# Patient Record
Sex: MALE | Race: BLACK OR AFRICAN AMERICAN | NOT HISPANIC OR LATINO | URBAN - METROPOLITAN AREA
[De-identification: names, ages, dates, MRNs, and addresses within clinical notes are randomized per-mention and may not be internally consistent; named-entity substitution may affect disease eponyms.]

---

## 2019-12-01 ENCOUNTER — OUTPATIENT (OUTPATIENT)
Dept: OUTPATIENT SERVICES | Facility: HOSPITAL | Age: 84
LOS: 1 days | End: 2019-12-01
Payer: MEDICAID

## 2019-12-01 PROCEDURE — G9001: CPT

## 2019-12-02 ENCOUNTER — INPATIENT (INPATIENT)
Facility: HOSPITAL | Age: 84
LOS: 4 days | Discharge: HOME HEALTH | DRG: 391 | End: 2019-12-07
Attending: HOSPITALIST | Admitting: HOSPITALIST
Payer: MEDICAID

## 2019-12-02 VITALS
WEIGHT: 154.1 LBS | DIASTOLIC BLOOD PRESSURE: 81 MMHG | OXYGEN SATURATION: 96 % | HEART RATE: 79 BPM | RESPIRATION RATE: 18 BRPM | SYSTOLIC BLOOD PRESSURE: 137 MMHG | TEMPERATURE: 99 F | HEIGHT: 68 IN

## 2019-12-02 LAB
ALBUMIN SERPL ELPH-MCNC: 4 G/DL — SIGNIFICANT CHANGE UP (ref 3.3–5.2)
ALP SERPL-CCNC: 67 U/L — SIGNIFICANT CHANGE UP (ref 40–120)
ALT FLD-CCNC: 12 U/L — SIGNIFICANT CHANGE UP
ANION GAP SERPL CALC-SCNC: 18 MMOL/L — HIGH (ref 5–17)
APPEARANCE UR: CLEAR — SIGNIFICANT CHANGE UP
AST SERPL-CCNC: 11 U/L — SIGNIFICANT CHANGE UP
BASOPHILS # BLD AUTO: 0.02 K/UL — SIGNIFICANT CHANGE UP (ref 0–0.2)
BASOPHILS NFR BLD AUTO: 0.2 % — SIGNIFICANT CHANGE UP (ref 0–2)
BILIRUB SERPL-MCNC: 1.1 MG/DL — SIGNIFICANT CHANGE UP (ref 0.4–2)
BILIRUB UR-MCNC: NEGATIVE — SIGNIFICANT CHANGE UP
BUN SERPL-MCNC: 44 MG/DL — HIGH (ref 8–20)
CALCIUM SERPL-MCNC: 11.5 MG/DL — HIGH (ref 8.6–10.2)
CHLORIDE SERPL-SCNC: 104 MMOL/L — SIGNIFICANT CHANGE UP (ref 98–107)
CO2 SERPL-SCNC: 22 MMOL/L — SIGNIFICANT CHANGE UP (ref 22–29)
COLOR SPEC: YELLOW — SIGNIFICANT CHANGE UP
CREAT SERPL-MCNC: 1.59 MG/DL — HIGH (ref 0.5–1.3)
DIFF PNL FLD: ABNORMAL
EOSINOPHIL # BLD AUTO: 0.01 K/UL — SIGNIFICANT CHANGE UP (ref 0–0.5)
EOSINOPHIL NFR BLD AUTO: 0.1 % — SIGNIFICANT CHANGE UP (ref 0–6)
EPI CELLS # UR: SIGNIFICANT CHANGE UP
GLUCOSE SERPL-MCNC: 300 MG/DL — HIGH (ref 70–115)
GLUCOSE UR QL: 100 MG/DL
HCT VFR BLD CALC: 39.2 % — SIGNIFICANT CHANGE UP (ref 39–50)
HGB BLD-MCNC: 12.5 G/DL — LOW (ref 13–17)
IMM GRANULOCYTES NFR BLD AUTO: 0.4 % — SIGNIFICANT CHANGE UP (ref 0–1.5)
KETONES UR-MCNC: ABNORMAL
LEUKOCYTE ESTERASE UR-ACNC: ABNORMAL
LIDOCAIN IGE QN: 17 U/L — LOW (ref 22–51)
LYMPHOCYTES # BLD AUTO: 1.99 K/UL — SIGNIFICANT CHANGE UP (ref 1–3.3)
LYMPHOCYTES # BLD AUTO: 18.3 % — SIGNIFICANT CHANGE UP (ref 13–44)
MCHC RBC-ENTMCNC: 28.2 PG — SIGNIFICANT CHANGE UP (ref 27–34)
MCHC RBC-ENTMCNC: 31.9 GM/DL — LOW (ref 32–36)
MCV RBC AUTO: 88.5 FL — SIGNIFICANT CHANGE UP (ref 80–100)
MONOCYTES # BLD AUTO: 0.83 K/UL — SIGNIFICANT CHANGE UP (ref 0–0.9)
MONOCYTES NFR BLD AUTO: 7.6 % — SIGNIFICANT CHANGE UP (ref 2–14)
NEUTROPHILS # BLD AUTO: 7.98 K/UL — HIGH (ref 1.8–7.4)
NEUTROPHILS NFR BLD AUTO: 73.4 % — SIGNIFICANT CHANGE UP (ref 43–77)
NITRITE UR-MCNC: NEGATIVE — SIGNIFICANT CHANGE UP
PH UR: 5 — SIGNIFICANT CHANGE UP (ref 5–8)
PLATELET # BLD AUTO: 220 K/UL — SIGNIFICANT CHANGE UP (ref 150–400)
POTASSIUM SERPL-MCNC: 5.2 MMOL/L — SIGNIFICANT CHANGE UP (ref 3.5–5.3)
POTASSIUM SERPL-SCNC: 5.2 MMOL/L — SIGNIFICANT CHANGE UP (ref 3.5–5.3)
PROT SERPL-MCNC: 8 G/DL — SIGNIFICANT CHANGE UP (ref 6.6–8.7)
PROT UR-MCNC: 30 MG/DL
RBC # BLD: 4.43 M/UL — SIGNIFICANT CHANGE UP (ref 4.2–5.8)
RBC # FLD: 12.8 % — SIGNIFICANT CHANGE UP (ref 10.3–14.5)
RBC CASTS # UR COMP ASSIST: SIGNIFICANT CHANGE UP /HPF (ref 0–4)
SODIUM SERPL-SCNC: 144 MMOL/L — SIGNIFICANT CHANGE UP (ref 135–145)
SP GR SPEC: 1.02 — SIGNIFICANT CHANGE UP (ref 1.01–1.02)
UROBILINOGEN FLD QL: NEGATIVE MG/DL — SIGNIFICANT CHANGE UP
WBC # BLD: 10.87 K/UL — HIGH (ref 3.8–10.5)
WBC # FLD AUTO: 10.87 K/UL — HIGH (ref 3.8–10.5)
WBC UR QL: SIGNIFICANT CHANGE UP

## 2019-12-02 PROCEDURE — 99223 1ST HOSP IP/OBS HIGH 75: CPT

## 2019-12-02 PROCEDURE — 74176 CT ABD & PELVIS W/O CONTRAST: CPT | Mod: 26

## 2019-12-02 PROCEDURE — 99285 EMERGENCY DEPT VISIT HI MDM: CPT

## 2019-12-02 RX ORDER — SODIUM CHLORIDE 9 MG/ML
500 INJECTION INTRAMUSCULAR; INTRAVENOUS; SUBCUTANEOUS ONCE
Refills: 0 | Status: COMPLETED | OUTPATIENT
Start: 2019-12-02 | End: 2019-12-02

## 2019-12-02 RX ADMIN — SODIUM CHLORIDE 500 MILLILITER(S): 9 INJECTION INTRAMUSCULAR; INTRAVENOUS; SUBCUTANEOUS at 20:00

## 2019-12-02 RX ADMIN — SODIUM CHLORIDE 500 MILLILITER(S): 9 INJECTION INTRAMUSCULAR; INTRAVENOUS; SUBCUTANEOUS at 21:01

## 2019-12-02 NOTE — ED ADULT NURSE NOTE - OBJECTIVE STATEMENT
Assumed pt. care at 1700. Pt. A&Ox2. Pt. daughter at bedside translating. Pt. daughter states that pt. was seen at [facility] on Thursday. Pt. daughter states that  they found tumors on his CT and was sent home on flagyl. Pt. daughter states she has been giving him Zofran she has. Pt. daughter states when he vomits its more like sputum. Pt. has diabetic wound on left on ankle. Pt. abdomen  tender on right side. Abdomen soft, bowel sounds heard. Last bowel movement Thursday. Denies diarrhea.

## 2019-12-02 NOTE — ED ADULT NURSE NOTE - CHIEF COMPLAINT QUOTE
pt brought in by EMS from [facility] for vomitting x5days and generalized pain. daughter at bedside states pt unable to tolerate PO [facility] at [facility] given antibiotics and bland diet with no relief. denies injuries or fall.

## 2019-12-02 NOTE — ED PROVIDER NOTE - OBJECTIVE STATEMENT
Patient is a 89 year old male with history of afib, DM, HTn presenting with persistent vomiting. Symptoms began 5 days ago. 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 Daughter notes he vomits every time he ingests something. Sent home with flagyl. No diarrhea. No AMS. Seen by PMD today who sent him to ED. Pt unable to contribute to further history.

## 2019-12-02 NOTE — ED PROVIDER NOTE - CLINICAL SUMMARY MEDICAL DECISION MAKING FREE TEXT BOX
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 will obtain labs, ivf, GI consult. possible rpt ct a/p

## 2019-12-02 NOTE — ED ADULT TRIAGE NOTE - CHIEF COMPLAINT QUOTE
pt brought in by EMS from [facility] for vomitting x5days and generalized pain. daughter at bedside states pt unable to tolerate PO seen at [facility] given antibiotics and bland diet with no relief. denies injuries or fall.

## 2019-12-02 NOTE — ED ADULT NURSE REASSESSMENT NOTE - NS ED NURSE REASSESS COMMENT FT1
Pt. mental status unchanged. Respirations even and unlabored. Pt. resting comfortably in stretcher. Pt. waiting for CT

## 2019-12-03 DIAGNOSIS — I48.91 UNSPECIFIED ATRIAL FIBRILLATION: ICD-10-CM

## 2019-12-03 DIAGNOSIS — R10.13 EPIGASTRIC PAIN: ICD-10-CM

## 2019-12-03 DIAGNOSIS — R11.11 VOMITING WITHOUT NAUSEA: ICD-10-CM

## 2019-12-03 DIAGNOSIS — D49.0 NEOPLASM OF UNSPECIFIED BEHAVIOR OF DIGESTIVE SYSTEM: ICD-10-CM

## 2019-12-03 DIAGNOSIS — Z71.89 OTHER SPECIFIED COUNSELING: ICD-10-CM

## 2019-12-03 LAB
ANION GAP SERPL CALC-SCNC: 12 MMOL/L — SIGNIFICANT CHANGE UP (ref 5–17)
BASOPHILS # BLD AUTO: 0.02 K/UL — SIGNIFICANT CHANGE UP (ref 0–0.2)
BASOPHILS NFR BLD AUTO: 0.3 % — SIGNIFICANT CHANGE UP (ref 0–2)
BUN SERPL-MCNC: 37 MG/DL — HIGH (ref 8–20)
CALCIUM SERPL-MCNC: 11.2 MG/DL — HIGH (ref 8.6–10.2)
CALCIUM SERPL-MCNC: 11.6 MG/DL — HIGH (ref 8.4–10.5)
CHLORIDE SERPL-SCNC: 109 MMOL/L — HIGH (ref 98–107)
CO2 SERPL-SCNC: 26 MMOL/L — SIGNIFICANT CHANGE UP (ref 22–29)
CREAT SERPL-MCNC: 1.38 MG/DL — HIGH (ref 0.5–1.3)
EOSINOPHIL # BLD AUTO: 0.05 K/UL — SIGNIFICANT CHANGE UP (ref 0–0.5)
EOSINOPHIL NFR BLD AUTO: 0.7 % — SIGNIFICANT CHANGE UP (ref 0–6)
GLUCOSE BLDC GLUCOMTR-MCNC: 193 MG/DL — HIGH (ref 70–99)
GLUCOSE BLDC GLUCOMTR-MCNC: 198 MG/DL — HIGH (ref 70–99)
GLUCOSE BLDC GLUCOMTR-MCNC: 286 MG/DL — HIGH (ref 70–99)
GLUCOSE BLDC GLUCOMTR-MCNC: 350 MG/DL — HIGH (ref 70–99)
GLUCOSE SERPL-MCNC: 199 MG/DL — HIGH (ref 70–115)
HBA1C BLD-MCNC: 10.7 % — HIGH (ref 4–5.6)
HCT VFR BLD CALC: 39.2 % — SIGNIFICANT CHANGE UP (ref 39–50)
HGB BLD-MCNC: 12.7 G/DL — LOW (ref 13–17)
IMM GRANULOCYTES NFR BLD AUTO: 0.1 % — SIGNIFICANT CHANGE UP (ref 0–1.5)
LYMPHOCYTES # BLD AUTO: 1.18 K/UL — SIGNIFICANT CHANGE UP (ref 1–3.3)
LYMPHOCYTES # BLD AUTO: 16.2 % — SIGNIFICANT CHANGE UP (ref 13–44)
MCHC RBC-ENTMCNC: 29 PG — SIGNIFICANT CHANGE UP (ref 27–34)
MCHC RBC-ENTMCNC: 32.4 GM/DL — SIGNIFICANT CHANGE UP (ref 32–36)
MCV RBC AUTO: 89.5 FL — SIGNIFICANT CHANGE UP (ref 80–100)
MONOCYTES # BLD AUTO: 0.68 K/UL — SIGNIFICANT CHANGE UP (ref 0–0.9)
MONOCYTES NFR BLD AUTO: 9.3 % — SIGNIFICANT CHANGE UP (ref 2–14)
NEUTROPHILS # BLD AUTO: 5.35 K/UL — SIGNIFICANT CHANGE UP (ref 1.8–7.4)
NEUTROPHILS NFR BLD AUTO: 73.4 % — SIGNIFICANT CHANGE UP (ref 43–77)
PLATELET # BLD AUTO: 216 K/UL — SIGNIFICANT CHANGE UP (ref 150–400)
POTASSIUM SERPL-MCNC: 4.3 MMOL/L — SIGNIFICANT CHANGE UP (ref 3.5–5.3)
POTASSIUM SERPL-SCNC: 4.3 MMOL/L — SIGNIFICANT CHANGE UP (ref 3.5–5.3)
PTH-INTACT FLD-MCNC: 106 PG/ML — HIGH (ref 15–65)
RBC # BLD: 4.38 M/UL — SIGNIFICANT CHANGE UP (ref 4.2–5.8)
RBC # FLD: 12.7 % — SIGNIFICANT CHANGE UP (ref 10.3–14.5)
SODIUM SERPL-SCNC: 147 MMOL/L — HIGH (ref 135–145)
WBC # BLD: 7.29 K/UL — SIGNIFICANT CHANGE UP (ref 3.8–10.5)
WBC # FLD AUTO: 7.29 K/UL — SIGNIFICANT CHANGE UP (ref 3.8–10.5)

## 2019-12-03 PROCEDURE — 99222 1ST HOSP IP/OBS MODERATE 55: CPT

## 2019-12-03 PROCEDURE — 12345: CPT | Mod: NC

## 2019-12-03 PROCEDURE — 99497 ADVNCD CARE PLAN 30 MIN: CPT | Mod: 25

## 2019-12-03 PROCEDURE — 93010 ELECTROCARDIOGRAM REPORT: CPT

## 2019-12-03 RX ORDER — SIMVASTATIN 20 MG/1
40 TABLET, FILM COATED ORAL AT BEDTIME
Refills: 0 | Status: DISCONTINUED | OUTPATIENT
Start: 2019-12-03 | End: 2019-12-07

## 2019-12-03 RX ORDER — INSULIN LISPRO 100/ML
VIAL (ML) SUBCUTANEOUS
Refills: 0 | Status: DISCONTINUED | OUTPATIENT
Start: 2019-12-03 | End: 2019-12-07

## 2019-12-03 RX ORDER — ONDANSETRON 8 MG/1
4 TABLET, FILM COATED ORAL EVERY 6 HOURS
Refills: 0 | Status: DISCONTINUED | OUTPATIENT
Start: 2019-12-03 | End: 2019-12-07

## 2019-12-03 RX ORDER — SODIUM CHLORIDE 9 MG/ML
1000 INJECTION INTRAMUSCULAR; INTRAVENOUS; SUBCUTANEOUS
Refills: 0 | Status: DISCONTINUED | OUTPATIENT
Start: 2019-12-03 | End: 2019-12-03

## 2019-12-03 RX ORDER — METOCLOPRAMIDE HCL 10 MG
10 TABLET ORAL EVERY 8 HOURS
Refills: 0 | Status: DISCONTINUED | OUTPATIENT
Start: 2019-12-03 | End: 2019-12-03

## 2019-12-03 RX ORDER — GABAPENTIN 400 MG/1
100 CAPSULE ORAL DAILY
Refills: 0 | Status: DISCONTINUED | OUTPATIENT
Start: 2019-12-03 | End: 2019-12-07

## 2019-12-03 RX ORDER — SODIUM CHLORIDE 9 MG/ML
1000 INJECTION INTRAMUSCULAR; INTRAVENOUS; SUBCUTANEOUS
Refills: 0 | Status: DISCONTINUED | OUTPATIENT
Start: 2019-12-03 | End: 2019-12-04

## 2019-12-03 RX ORDER — DORZOLAMIDE HYDROCHLORIDE TIMOLOL MALEATE 20; 5 MG/ML; MG/ML
1 SOLUTION/ DROPS OPHTHALMIC
Refills: 0 | Status: DISCONTINUED | OUTPATIENT
Start: 2019-12-03 | End: 2019-12-07

## 2019-12-03 RX ORDER — LISINOPRIL 2.5 MG/1
20 TABLET ORAL DAILY
Refills: 0 | Status: DISCONTINUED | OUTPATIENT
Start: 2019-12-03 | End: 2019-12-03

## 2019-12-03 RX ORDER — INSULIN LISPRO 100/ML
VIAL (ML) SUBCUTANEOUS AT BEDTIME
Refills: 0 | Status: DISCONTINUED | OUTPATIENT
Start: 2019-12-03 | End: 2019-12-07

## 2019-12-03 RX ORDER — ALBUTEROL 90 UG/1
2.5 AEROSOL, METERED ORAL ONCE
Refills: 0 | Status: COMPLETED | OUTPATIENT
Start: 2019-12-03 | End: 2019-12-03

## 2019-12-03 RX ORDER — ACETAMINOPHEN 500 MG
1000 TABLET ORAL ONCE
Refills: 0 | Status: COMPLETED | OUTPATIENT
Start: 2019-12-03 | End: 2019-12-03

## 2019-12-03 RX ORDER — SODIUM CHLORIDE 9 MG/ML
1000 INJECTION, SOLUTION INTRAVENOUS
Refills: 0 | Status: DISCONTINUED | OUTPATIENT
Start: 2019-12-03 | End: 2019-12-07

## 2019-12-03 RX ORDER — HEPARIN SODIUM 5000 [USP'U]/ML
5000 INJECTION INTRAVENOUS; SUBCUTANEOUS EVERY 8 HOURS
Refills: 0 | Status: DISCONTINUED | OUTPATIENT
Start: 2019-12-03 | End: 2019-12-07

## 2019-12-03 RX ORDER — DEXTROSE 50 % IN WATER 50 %
25 SYRINGE (ML) INTRAVENOUS ONCE
Refills: 0 | Status: DISCONTINUED | OUTPATIENT
Start: 2019-12-03 | End: 2019-12-07

## 2019-12-03 RX ORDER — DEXTROSE 50 % IN WATER 50 %
12.5 SYRINGE (ML) INTRAVENOUS ONCE
Refills: 0 | Status: DISCONTINUED | OUTPATIENT
Start: 2019-12-03 | End: 2019-12-07

## 2019-12-03 RX ORDER — ASPIRIN/CALCIUM CARB/MAGNESIUM 324 MG
81 TABLET ORAL DAILY
Refills: 0 | Status: DISCONTINUED | OUTPATIENT
Start: 2019-12-03 | End: 2019-12-07

## 2019-12-03 RX ORDER — METOCLOPRAMIDE HCL 10 MG
10 TABLET ORAL EVERY 6 HOURS
Refills: 0 | Status: DISCONTINUED | OUTPATIENT
Start: 2019-12-03 | End: 2019-12-07

## 2019-12-03 RX ORDER — GLUCAGON INJECTION, SOLUTION 0.5 MG/.1ML
1 INJECTION, SOLUTION SUBCUTANEOUS ONCE
Refills: 0 | Status: DISCONTINUED | OUTPATIENT
Start: 2019-12-03 | End: 2019-12-07

## 2019-12-03 RX ORDER — DEXTROSE 50 % IN WATER 50 %
15 SYRINGE (ML) INTRAVENOUS ONCE
Refills: 0 | Status: DISCONTINUED | OUTPATIENT
Start: 2019-12-03 | End: 2019-12-07

## 2019-12-03 RX ADMIN — DORZOLAMIDE HYDROCHLORIDE TIMOLOL MALEATE 1 DROP(S): 20; 5 SOLUTION/ DROPS OPHTHALMIC at 06:49

## 2019-12-03 RX ADMIN — Medication 400 MILLIGRAM(S): at 16:15

## 2019-12-03 RX ADMIN — Medication 1: at 16:12

## 2019-12-03 RX ADMIN — SODIUM CHLORIDE 80 MILLILITER(S): 9 INJECTION INTRAMUSCULAR; INTRAVENOUS; SUBCUTANEOUS at 22:10

## 2019-12-03 RX ADMIN — HEPARIN SODIUM 5000 UNIT(S): 5000 INJECTION INTRAVENOUS; SUBCUTANEOUS at 12:19

## 2019-12-03 RX ADMIN — Medication 10 MILLIGRAM(S): at 17:39

## 2019-12-03 RX ADMIN — Medication 81 MILLIGRAM(S): at 11:17

## 2019-12-03 RX ADMIN — HEPARIN SODIUM 5000 UNIT(S): 5000 INJECTION INTRAVENOUS; SUBCUTANEOUS at 22:10

## 2019-12-03 RX ADMIN — ONDANSETRON 4 MILLIGRAM(S): 8 TABLET, FILM COATED ORAL at 16:15

## 2019-12-03 RX ADMIN — ALBUTEROL 2.5 MILLIGRAM(S): 90 AEROSOL, METERED ORAL at 14:18

## 2019-12-03 RX ADMIN — DORZOLAMIDE HYDROCHLORIDE TIMOLOL MALEATE 1 DROP(S): 20; 5 SOLUTION/ DROPS OPHTHALMIC at 16:15

## 2019-12-03 RX ADMIN — Medication 2: at 22:12

## 2019-12-03 RX ADMIN — SODIUM CHLORIDE 75 MILLILITER(S): 9 INJECTION INTRAMUSCULAR; INTRAVENOUS; SUBCUTANEOUS at 04:58

## 2019-12-03 RX ADMIN — GABAPENTIN 100 MILLIGRAM(S): 400 CAPSULE ORAL at 11:18

## 2019-12-03 RX ADMIN — Medication 1: at 11:17

## 2019-12-03 RX ADMIN — Medication 3: at 07:58

## 2019-12-03 NOTE — GOALS OF CARE CONVERSATION - ADVANCED CARE PLANNING - CONVERSATION DETAILS
I spoke with Patient's daughter Lisset, who is an RN  at [employer].  Patient lives with daughter, has been pretty much independent until 6 weeks ago. He is OOB/CH, moving with cane and  wheelchair as needed.    We spoke about his Gastric Mass, she has spoken with GI Specialist who advises conservative treatment, avoid EGD  may cause more harm then good.  Patient is not able to tolerate food or fluids, one of her goals is to be able to keep her father hydrated.  Would consider a feeding tube, if it was indicated.    MOLST form she was somewhat familiar with.  I reviewed document in its entirety, completed and has been signed. Designated as DNR/DNI but  continue to treat and pursue all medical interventions as needed.    She has already completed and submitted MEDICAID Dept. application for providing HHA's at home for her father.  Not sure when the evaluation will be done.    I spoke about Hospice benefit, Lisset would rather pursue Medicaid Aides that would hopefully provide more hours of  home care for her Dad

## 2019-12-03 NOTE — H&P ADULT - HISTORY OF PRESENT ILLNESS
89yoM Creole speaking, hx HTN, DM, recently diagnosed with Afib and gastric mass at OSH presenting with nausea and vomiting.  Pt is poor historian despite use of Creole .  Collateral hx obtained via phone from daughter Lisset ([phone]).  Pt reports having generalized abdominal pain associated with nausea, non-bilious, non-bloody vomiting.  He is unable to specify how long he has been having these symptoms. Pt denies any fevers, chills or diarrhea. 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  Pt was started on Flagyl by [facility].

## 2019-12-03 NOTE — CONSULT NOTE ADULT - SUBJECTIVE AND OBJECTIVE BOX
HPI:This is an elderly Creole Speaking Male PMH HTN, DM, AFIB and Neuropathy was diagnosed reently with a gastric mass that is associated with persistent nausea and vomiting.  Unable to tolerate solid food. He C/O epigastric and generalized abdominal pain and pressure.  AT [facility] last week, CT read as "GIST" tumor. in fundal portion of stomach.  No other complaints at present    89yoM Creole speaking, hx HTN, DM, recently diagnosed with Afib and gastric mass at OSH presenting with nausea and vomiting.  Pt is poor historian despite use of Creole .  Collateral hx obtained via phone from daughter Lisset ([phone]).  Pt reports having generalized abdominal pain associated with nausea, non-bilious, non-bloody vomiting.  He is unable to specify how long he has been having these symptoms. Pt denies any fevers, chills or diarrhea. 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  Pt was started on Flagyl by [facility]. (03 Dec 2019 03:58)      PERTINENT PMH REVIEWED: Yes    PAST MEDICAL & SURGICAL HISTORY:  Neuropathy  Hypertension  A-fib  Diabetes  No significant past surgical history    SOCIAL HISTORY:  EtOH Yes   No                                    Drugs  Yes  No                                    smoker  nonsmoker                                    Admitted from: [facility] [phone]    FAMILY HISTORY:  No pertinent family history in first degree relatives    No Known Allergies    Baseline ADLs (prior to admission):  Independent/ Dependent      Present Symptoms:     Dyspnea: 0   Nausea/Vomiting: Yes   Anxiety:  Yes No  Depression: Yes   Fatigue: Yes   Loss of appetite: Yes not tolerating solid food    Pain: epigastic and generalized abdominal pain            Character-            Duration-            Effect-            Factors-            Frequency-            Location-            Severity-    Review of Systems: Reviewed                      All others negative    MEDICATIONS  (STANDING):  aspirin enteric coated 81 milliGRAM(s) Oral daily  dextrose 5%. 1000 milliLiter(s) (50 mL/Hr) IV Continuous <Continuous>  dextrose 50% Injectable 12.5 Gram(s) IV Push once  dextrose 50% Injectable 25 Gram(s) IV Push once  dextrose 50% Injectable 25 Gram(s) IV Push once  dorzolamide 2%/timolol 0.5% Ophthalmic Solution 1 Drop(s) Both EYES two times a day  gabapentin 100 milliGRAM(s) Oral daily  heparin  Injectable 5000 Unit(s) SubCutaneous every 8 hours  insulin lispro (HumaLOG) corrective regimen sliding scale   SubCutaneous three times a day before meals  insulin lispro (HumaLOG) corrective regimen sliding scale   SubCutaneous at bedtime  simvastatin 40 milliGRAM(s) Oral at bedtime  sodium chloride 0.9%. 1000 milliLiter(s) (75 mL/Hr) IV Continuous <Continuous>    MEDICATIONS  (PRN):  dextrose 40% Gel 15 Gram(s) Oral once PRN Blood Glucose LESS THAN 70 milliGRAM(s)/deciliter  glucagon  Injectable 1 milliGRAM(s) IntraMuscular once PRN Glucose LESS THAN 70 milligrams/deciliter  metoclopramide Injectable 10 milliGRAM(s) IV Push every 8 hours PRN Nausea and/or Vomiting  ondansetron Injectable 4 milliGRAM(s) IV Push every 6 hours PRN Nausea and/or Vomiting      PHYSICAL EXAM:    Vital Signs Last 24 Hrs  T(C): 36.9 (03 Dec 2019 07:39), Max: 37 (02 Dec 2019 16:20)  T(F): 98.5 (03 Dec 2019 07:39), Max: 98.6 (02 Dec 2019 16:20)  HR: 61 (03 Dec 2019 07:39) (61 - 97)  BP: 142/72 (03 Dec 2019 07:39) (129/78 - 142/72)  BP(mean): --  RR: 18 (03 Dec 2019 07:39) (18 - 18)  SpO2: 91% (03 Dec 2019 07:39) (91% - 100%)    General: alert  oriented x ____ lethargic                   cachexia lower extremity edema, ace wraps lower calfs     HEENT: dry mouth    Lungs: comfortable       CV: normal      GI: distended  tender  no BS               constipation  last BM:     : normal  incontinent     MSK:  weakness  edema             ambulatory  bedbound/wheelchair bound    Skin: normal  pressure ulcers- Stage_____  no rash    LABS:                        12.7   7.29  )-----------( 216      ( 03 Dec 2019 11:42 )             39.2     12-03    147<H>  |  109<H>  |  37.0<H>  ----------------------------<  199<H>  4.3   |  26.0  |  1.38<H>    Ca    11.2<H>      03 Dec 2019 11:42    TPro  8.0  /  Alb  4.0  /  TBili  1.1  /  DBili  x   /  AST  11  /  ALT  12  /  AlkPhos  67  12-02      Urinalysis Basic - ( 02 Dec 2019 20:10 )    Color: Yellow / Appearance: Clear / S.025 / pH: x  Gluc: x / Ketone: Small  / Bili: Negative / Urobili: Negative mg/dL   Blood: x / Protein: 30 mg/dL / Nitrite: Negative   Leuk Esterase: Trace / RBC: 0-2 /HPF / WBC 0-2   Sq Epi: x / Non Sq Epi: Occasional / Bacteria: x      I&O's Summary    RADIOLOGY & ADDITIONAL STUDIES:  < from: CT Abdomen and Pelvis No Cont (19 @ 21:56) >    IMPRESSION:    Uncomplicated cholelithiasis.    Collapsed stomach with incompletely characterized exophytic lesion   measuring 4 x 3.6 cm in the fundal portion. 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 GI consultation and further workup as   indicated. No evidence of small bowel obstruction or extraluminal   collection.     Incompletely characterized sub-cm left adrenal nodule.    Heterogeneously prominent prostate with nodular density near the apex and   bladder protrusion. Mild bladder wall thickening with moderate   distention. This may related to chronic bladder obstruction from enlarged   prostate. Correlate with urinalysis to assess for infection. Consider   direct visualization if there is concern for bladder malignancy.    Too small to characterize bilateral subpleural based nodules for which   nonemergent short-term pulmonary imaging follow-up is advised.      ADVANCE DIRECTIVES:   DNR  NO  Completed on:                     MOLST   NO   Completed on:  Living Will   NO   Completed on: HPI:This is an elderly Creole Speaking Male PMH HTN, DM, AFIB and Neuropathy was diagnosed reently with a gastric mass that is associated with persistent wretching and vomiting.  Unable to tolerateliquids or  solid food. He C/O epigastric and generalized abdominal pain and pressure.  AT [facility] last week, CT read as "GIST" tumor. in fundal portion of stomach.  No other complaints at present    89yoM Creole speaking, hx HTN, DM, recently diagnosed with Afib and gastric mass at OSH presenting with nausea and vomiting.  Pt is poor historian despite use of Creole .  Collateral hx obtained via phone from daughter Lisset ([phone]).  Pt reports having generalized abdominal pain associated with nausea, non-bilious, non-bloody vomiting.  He is unable to specify how long he has been having these symptoms. Pt denies any fevers, chills or diarrhea. 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  Pt was started on Flagyl by [facility]. (03 Dec 2019 03:58)      PERTINENT PMH REVIEWED: Yes    PAST MEDICAL & SURGICAL HISTORY:  Neuropathy  Hypertension  A-fib  Diabetes  No significant past surgical history    SOCIAL HISTORY:  EtOH   No                                    Drugs    No                                   nonsmoker                                    Admitted from: home surrogate daughter Lisset Alonzo [phone]    FAMILY HISTORY:  No pertinent family history in first degree relatives    No Known Allergies    Baseline ADLs (prior to admission):  Independent/ Dependent      Present Symptoms:     Dyspnea: 0 when having a coughing jag  Nausea/Vomiting: Yes   Anxiety:   No  Depression: Yes   Fatigue: Yes   Loss of appetite: Yes not tolerating solid food since     Pain: epigastic and generalized abdominal pain            Character-            Duration-            Effect-            Factors-            Frequency-            Location-            Severity-moderate    Review of Systems: Reviewed                      All others negative    MEDICATIONS  (STANDING):  aspirin enteric coated 81 milliGRAM(s) Oral daily  dextrose 5%. 1000 milliLiter(s) (50 mL/Hr) IV Continuous <Continuous>  dextrose 50% Injectable 12.5 Gram(s) IV Push once  dextrose 50% Injectable 25 Gram(s) IV Push once  dextrose 50% Injectable 25 Gram(s) IV Push once  dorzolamide 2%/timolol 0.5% Ophthalmic Solution 1 Drop(s) Both EYES two times a day  gabapentin 100 milliGRAM(s) Oral daily  heparin  Injectable 5000 Unit(s) SubCutaneous every 8 hours  insulin lispro (HumaLOG) corrective regimen sliding scale   SubCutaneous three times a day before meals  insulin lispro (HumaLOG) corrective regimen sliding scale   SubCutaneous at bedtime  simvastatin 40 milliGRAM(s) Oral at bedtime  sodium chloride 0.9%. 1000 milliLiter(s) (75 mL/Hr) IV Continuous <Continuous>    MEDICATIONS  (PRN):  dextrose 40% Gel 15 Gram(s) Oral once PRN Blood Glucose LESS THAN 70 milliGRAM(s)/deciliter  glucagon  Injectable 1 milliGRAM(s) IntraMuscular once PRN Glucose LESS THAN 70 milligrams/deciliter  metoclopramide Injectable 10 milliGRAM(s) IV Push every 8 hours PRN Nausea and/or Vomiting  ondansetron Injectable 4 milliGRAM(s) IV Push every 6 hours PRN Nausea and/or Vomiting      PHYSICAL EXAM:    Vital Signs Last 24 Hrs  T(C): 36.9 (03 Dec 2019 07:39), Max: 37 (02 Dec 2019 16:20)  T(F): 98.5 (03 Dec 2019 07:39), Max: 98.6 (02 Dec 2019 16:20)  HR: 61 (03 Dec 2019 07:39) (61 - 97)  BP: 142/72 (03 Dec 2019 07:39) (129/78 - 142/72)  BP(mean): --  RR: 18 (03 Dec 2019 07:39) (18 - 18)  SpO2: 91% (03 Dec 2019 07:39) (91% - 100%)    General: alert  oriented x _3___ weak short term memory loss                  cachexia lower extremity edema, ace wraps lower calfs     HEENT: dry mouth    Lungs: comfortable       CV: normal      GI: distended  tender  no BS               constipation  last BM:     : normal  incontinent     MSK:  weakness  edema             ambulatory  bedbound/wheelchair bound    Skin:  pressure ulcers-Left foot ulcer   no rash    LABS:                        12.7   7.29  )-----------( 216      ( 03 Dec 2019 11:42 )             39.2     12-03    147<H>  |  109<H>  |  37.0<H>  ----------------------------<  199<H>  4.3   |  26.0  |  1.38<H>    Ca    11.2<H>      03 Dec 2019 11:42    TPro  8.0  /  Alb  4.0  /  TBili  1.1  /  DBili  x   /  AST  11  /  ALT  12  /  AlkPhos  67  12-02      Urinalysis Basic - ( 02 Dec 2019 20:10 )  Color: Yellow / Appearance: Clear / S.025 / pH: x  Gluc: x / Ketone: Small  / Bili: Negative / Urobili: Negative mg/dL   Blood: x / Protein: 30 mg/dL / Nitrite: Negative   Leuk Esterase: Trace / RBC: 0-2 /HPF / WBC 0-2   Sq Epi: x / Non Sq Epi: Occasional / Bacteria: x      I&O's Summary    RADIOLOGY & ADDITIONAL STUDIES:  < from: CT Abdomen and Pelvis No Cont (19 @ 21:56) >    IMPRESSION:    Uncomplicated cholelithiasis.    Collapsed stomach with incompletely characterized exophytic lesion   measuring 4 x 3.6 cm in the fundal portion. 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 GI consultation and further workup as   indicated. No evidence of small bowel obstruction or extraluminal   collection.     Incompletely characterized sub-cm left adrenal nodule.    Heterogeneously prominent prostate with nodular density near the apex and   bladder protrusion. Mild bladder wall thickening with moderate   distention. This may related to chronic bladder obstruction from enlarged   prostate. Correlate with urinalysis to assess for infection. Consider   direct visualization if there is concern for bladder malignancy.    Too small to characterize bilateral subpleural based nodules for which   nonemergent short-term pulmonary imaging follow-up is advised.      ADVANCE DIRECTIVES:   DNR YES Completed on:   12/3/2019                  MOLST   YES   Completed on: 12/3/2019  Living Will   NO   Completed on:

## 2019-12-03 NOTE — CONSULT NOTE ADULT - ATTENDING COMMENTS
COUNSELING:    Face to face meeting to discuss Advanced Care Planning - Time Spent ______ Minutes.    More than 50% time spent in counseling and coordinating care. ____20__ Minutes.     Thank you for the opportunity to assist with the care of this patient.   [place] [facility] [phone].     See goals of care note.  Meeting with daughter shortly this afternoon to discuss her father's health and goals of care COUNSELING:    Face to face meeting to discuss Advanced Care Planning - Time Spent 20______ Minutes.    More than 50% time spent in counseling and coordinating care. ____30__ Minutes.     Thank you for the opportunity to assist with the care of this patient.   [place] [facility] [phone].     See goals of care note.  Meeting with daughter shortly this afternoon to discuss her father's health and goals of care

## 2019-12-03 NOTE — CONSULT NOTE ADULT - PROBLEM SELECTOR RECOMMENDATION 2
+ Cholelithiasis seen on CT  Gastric mass may be causing pressure and visceral pain  Consider increasing Neurontin  Consider initiating Fentanyl 12 mcg/hr patch for long acting pain relief  Ofirmev IVPB for pain  Tylenol liquid ATC

## 2019-12-03 NOTE — H&P ADULT - ASSESSMENT
89yoM Creole speaking, hx HTN, DM, recently diagnosed with Afib and gastric mass at OSH presenting with nausea and vomiting in setting of gastric mass    Intractable nausea and vomiting in setting of gastric mass  -CT abd/pelvis showing exophytic mass concerning for neoplasm  -Anti-emetics PRN  -Start gentle IVF  -Clear liquid diet, advance as tolerated  -Daughter aware of mass, informed her that per radiology report, there is concern for neoplasm  -Not sure if pt would be candidate for endoscopy given advanced age, but daughter requesting GI eval  -GI consulted  -Will hold off on resuming Flagyl as no diarrhea or systemic signs of infection     VINCE  -Likely pre-renal from GI losses  -Started on IVF, monitor Cr   -Holding Lisinopril     Hypercalcemia  -Repeat CA in AM, check PTH  -Holding HCTZ    Gastric mass    HTN  -Holding Lisinopril due to VINCE and HCTZ given hypercalcemia    DM  -Holding sulfonylurea   -Insulin sliding scale    Paroxysmal Afib  -Per daughter, had episode of Afib at OSH, not placed on any AC, presumably due to advanced age  -Monitor    Medication management  -Med rec left incomplete, partially done from meds listed in prescription writer  -Can call [facility] in     Prophylactic measure  -Heparin 89yoM Creole speaking, hx HTN, DM, recently diagnosed with Afib and gastric mass at OSH presenting with nausea and vomiting in setting of gastric mass    Intractable nausea and vomiting in setting of gastric mass  -CT abd/pelvis showing exophytic mass concerning for neoplasm  -Anti-emetics PRN  -Start gentle IVF  -Clear liquid diet, advance as tolerated  -Daughter aware of mass, informed her that per radiology report, there is concern for neoplasm  -Not sure if pt would be candidate for endoscopy given advanced age, but daughter requesting GI eval  -GI consulted  -Will hold off on resuming Flagyl as no diarrhea or systemic signs of infection     VINCE  -Likely pre-renal from GI losses  -Started on IVF, monitor Cr   -Holding Lisinopril     Hypercalcemia  -Repeat CA in AM, check PTH  -Holding HCTZ    HTN  -Holding Lisinopril due to VINCE and HCTZ given hypercalcemia    DM  -Holding sulfonylurea   -Insulin sliding scale    Paroxysmal Afib  -Per daughter, had episode of Afib at OSH, not placed on any AC, presumably due to advanced age  -Monitor    Medication management  -Med rec left incomplete, partially done from meds listed in prescription writer  -Can call [facility] in     Prophylactic measure  -Heparin

## 2019-12-03 NOTE — CONSULT NOTE ADULT - PROBLEM SELECTOR RECOMMENDATION 3
CT report provided earlier in this consult  GI consulted  Spoke with daughter and HCP-GI very reluctant to do EGD diagnostically, he is at   risk for many complications=avoid at all cost

## 2019-12-03 NOTE — CONSULT NOTE ADULT - SUBJECTIVE AND OBJECTIVE BOX
HPI:  89yoM Creole speaking, hx HTN, DM, AFIB( No AC), Neuropathy and glaucoma.   Pt is poor historian.   History obtained via phone from daughter Lisset Benavides ([phone]).  Daughter reports nausea, with non-bilious, non-bloody vomiting x 4 days.    Pt denies any fevers, chills or diarrhea. 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   Pt was started on Flagyl by Good José Manuel, given IVF and treated and released.  Since returning home, patient has had persistent vomiting on all attempts at feeding.  No bleeding.  No abdominal distention.   Per daughter there has never been an endoscopy or colonoscopy.  Pt had become progressively weaker and therefore brought to [facility].  No overt toxicity.  Repeat imaging performed.  Given IVF for PRA and Reglan.  No other new medical issues.  Pt lives at home with Daughter and her family.  No bleeding or weight loss.                PAST MEDICAL & SURGICAL HISTORY:  Neuropathy  Hypertension  A-fib  Diabetes  No significant past surgical history      ROS:  No Heartburn, regurgitation, dysphagia, odynophagia.  No dyspepsia  No abdominal pain.    No Nausea, vomiting.  No Bleeding.  No hematemesis.   No diarrhea.    No hematochezia.  No weight loss, anorexia.  No edema.      MEDICATIONS  (STANDING):  aspirin enteric coated 81 milliGRAM(s) Oral daily  dextrose 5%. 1000 milliLiter(s) (50 mL/Hr) IV Continuous <Continuous>  dextrose 50% Injectable 12.5 Gram(s) IV Push once  dextrose 50% Injectable 25 Gram(s) IV Push once  dextrose 50% Injectable 25 Gram(s) IV Push once  dorzolamide 2%/timolol 0.5% Ophthalmic Solution 1 Drop(s) Both EYES two times a day  gabapentin 100 milliGRAM(s) Oral daily  heparin  Injectable 5000 Unit(s) SubCutaneous every 8 hours  insulin lispro (HumaLOG) corrective regimen sliding scale   SubCutaneous three times a day before meals  insulin lispro (HumaLOG) corrective regimen sliding scale   SubCutaneous at bedtime  simvastatin 40 milliGRAM(s) Oral at bedtime  sodium chloride 0.9%. 1000 milliLiter(s) (75 mL/Hr) IV Continuous <Continuous>    MEDICATIONS  (PRN):  dextrose 40% Gel 15 Gram(s) Oral once PRN Blood Glucose LESS THAN 70 milliGRAM(s)/deciliter  glucagon  Injectable 1 milliGRAM(s) IntraMuscular once PRN Glucose LESS THAN 70 milligrams/deciliter  metoclopramide Injectable 10 milliGRAM(s) IV Push every 8 hours PRN Nausea and/or Vomiting  ondansetron Injectable 4 milliGRAM(s) IV Push every 6 hours PRN Nausea and/or Vomiting      Allergies  No Known Allergies    SOCIAL HISTORY:  NC    FAMILY HISTORY:  No pertinent family history in first degree relatives      Vital Signs Last 24 Hrs  T(C): 36.9 (03 Dec 2019 07:39), Max: 37 (02 Dec 2019 16:20)  T(F): 98.5 (03 Dec 2019 07:39), Max: 98.6 (02 Dec 2019 16:20)  HR: 61 (03 Dec 2019 07:39) (61 - 97)  BP: 142/72 (03 Dec 2019 07:39) (129/78 - 142/72)  BP(mean): --  RR: 18 (03 Dec 2019 07:39) (18 - 18)  SpO2: 91% (03 Dec 2019 07:39) (91% - 100%)    PHYSICAL EXAM:    GENERAL: NAD, well-groomed, well-developed  HEAD:  Atraumatic, Normocephalic  EYES: EOMI, PERRLA, conjunctiva and sclera clear.  Bilateral Arcus senilis.    ENMT: No tonsillar erythema, exudates, or enlargement; Moist mucous membranes, Good dentition, No lesions  NECK: Supple, No JVD, Normal thyroid  CHEST/LUNG: Clear to percussion bilaterally; No rales, rhonchi, wheezing, or rubs  HEART: Regular rate and rhythm; No murmurs, rubs, or gallops  ABDOMEN: Soft, Nontender, Nondistended; Bowel sounds present.  No scars.  No HSM.  No MTR.  Unable to do SE in ED , on Wall in CDU.    EXTREMITIES:  2+ Peripheral Pulses, No clubbing, cyanosis, or edema  LYMPH: No lymphadenopathy noted  SKIN: No rashes or lesions      LABS:                        12.5   10.87 )-----------( 220      ( 02 Dec 2019 20:08 )             39.2     12-    144  |  104  |  44.0<H>  ----------------------------<  300<H>  5.2   |  22.0  |  1.59<H>    Ca    11.5<H>      02 Dec 2019 20:08    TPro  8.0  /  Alb  4.0  /  TBili  1.1  /  DBili  x   /  AST  11  /  ALT  12  /  AlkPhos  67  12-02       Urinalysis Basic - ( 02 Dec 2019 20:10 )    Color: Yellow / Appearance: Clear / S.025 / pH: x  Gluc: x / Ketone: Small  / Bili: Negative / Urobili: Negative mg/dL   Blood: x / Protein: 30 mg/dL / Nitrite: Negative   Leuk Esterase: Trace / RBC: 0-2 /HPF / WBC 0-2   Sq Epi: x / Non Sq Epi: Occasional / Bacteria: x        LIVER FUNCTIONS - ( 02 Dec 2019 20:08 )  Alb: 4.0 g/dL / Pro: 8.0 g/dL / ALK PHOS: 67 U/L / ALT: 12 U/L / AST: 11 U/L / GGT: x               RADIOLOGY & ADDITIONAL STUDIES:  CT scan A/P:  GS in GB.  Exophytic 4 cm solid lesion off the fundus of the stomach s lymphadenopathy or inflammation.  Right kidney stone s hydro.  Left renal cyst.  Stool in Cecum, cannot exclude cecal neoplasm.

## 2019-12-03 NOTE — CHART NOTE - NSCHARTNOTEFT_GEN_A_CORE
Pt is seen and evaluated during the rounds today, chart reviewed, he has some abdominal pain  some tenderness at the epigastric region  Palliative consult called give the hx of gastric mass  patient was noted to have thick mucoid secretion with cough  will keep him aspiration precaution  will keep npo  swallow eval  iv fluids  cxr.

## 2019-12-03 NOTE — CONSULT NOTE ADULT - ASSESSMENT
Dehydration and Pre renal Azotemia.  Being addressed.    Unclear etiology for the vomiting.  No evidence for bowel obstruction on CT scan.  No overt bleeding.    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  Abdominal exam is benign.  No inflammation or pancreatitis or GB disease on CT scan.    Suggest Discontinuation of the flagyl;  IV hydration.  Discussed at length with daughter about pursuing endoscopic procedures in this patient.  She is agreeable to hold on procedures and treat symptomatically.

## 2019-12-03 NOTE — H&P ADULT - NSHPPHYSICALEXAM_GEN_ALL_CORE
Vital Signs Last 24 Hrs  T(C): 36.8 (03 Dec 2019 02:16), Max: 37 (02 Dec 2019 16:20)  T(F): 98.3 (03 Dec 2019 02:16), Max: 98.6 (02 Dec 2019 16:20)  HR: 72 (03 Dec 2019 02:16) (72 - 97)  BP: 129/78 (03 Dec 2019 02:16) (129/78 - 137/81)  BP(mean): --  RR: 18 (03 Dec 2019 02:16) (18 - 18)  SpO2: 100% (03 Dec 2019 02:16) (95% - 100%)    GENERAL:  Well-appearing elderly male, not in acute distress, Creole speaking  EYES:  Clear conjunctiva, extraocular movement intact  ENT: Moist mucous membranes  RESP:  Non-labored breathing pattern, lungs clear to ausculation   CV: Regular rate and rhythm, no murmurs appreciated, no lower extremity edema  GI: Soft, mild tenderness in all 4 quadrants, non-distended  NEURO: Awake, alert, conversant, non-focal   PSYCH: Calm, cooperative  SKIN: No rash or lesions, warm and dry

## 2019-12-03 NOTE — ED ADULT NURSE REASSESSMENT NOTE - NS ED NURSE REASSESS COMMENT FT1
Received report from offgoing RN. Charting as noted. Patient resting comfortably in stretcher. No signs of distress. Plan of care explained to daughter. Verbalized understanding.

## 2019-12-03 NOTE — H&P ADULT - NSHPLABSRESULTS_GEN_ALL_CORE
12.5   10.87 )-----------( 220      ( 02 Dec 2019 20:08 )             39.2       12-02    144  |  104  |  44.0<H>  ----------------------------<  300<H>  5.2   |  22.0  |  1.59<H>    Ca    11.5<H>      02 Dec 2019 20:08    TPro  8.0  /  Alb  4.0  /  TBili  1.1  /  DBili  x   /  AST  11  /  ALT  12  /  AlkPhos  67  12-02

## 2019-12-03 NOTE — CONSULT NOTE ADULT - CONSULT REASON
Patient with Gastric Mass, came in with Nausea and Vomiting  Need Help with goals of Care Conversation

## 2019-12-03 NOTE — CONSULT NOTE ADULT - PROBLEM SELECTOR RECOMMENDATION 9
Reglan 10mg IV Q6 changed from prn to ATC  Continue alternating with Zofran if needed  Emesis receptacle within reach  Clear liquid diet-  Glucerna for nutrition

## 2019-12-04 LAB
ALBUMIN SERPL ELPH-MCNC: 3.3 G/DL — SIGNIFICANT CHANGE UP (ref 3.3–5.2)
ALP SERPL-CCNC: 53 U/L — SIGNIFICANT CHANGE UP (ref 40–120)
ALT FLD-CCNC: 11 U/L — SIGNIFICANT CHANGE UP
ANION GAP SERPL CALC-SCNC: 11 MMOL/L — SIGNIFICANT CHANGE UP (ref 5–17)
AST SERPL-CCNC: 12 U/L — SIGNIFICANT CHANGE UP
BILIRUB SERPL-MCNC: 0.9 MG/DL — SIGNIFICANT CHANGE UP (ref 0.4–2)
BUN SERPL-MCNC: 29 MG/DL — HIGH (ref 8–20)
CALCIUM SERPL-MCNC: 10.6 MG/DL — HIGH (ref 8.6–10.2)
CHLORIDE SERPL-SCNC: 116 MMOL/L — HIGH (ref 98–107)
CO2 SERPL-SCNC: 22 MMOL/L — SIGNIFICANT CHANGE UP (ref 22–29)
CREAT SERPL-MCNC: 1.14 MG/DL — SIGNIFICANT CHANGE UP (ref 0.5–1.3)
GLUCOSE BLDC GLUCOMTR-MCNC: 223 MG/DL — HIGH (ref 70–99)
GLUCOSE BLDC GLUCOMTR-MCNC: 269 MG/DL — HIGH (ref 70–99)
GLUCOSE BLDC GLUCOMTR-MCNC: 303 MG/DL — HIGH (ref 70–99)
GLUCOSE BLDC GLUCOMTR-MCNC: 43 MG/DL — CRITICAL LOW (ref 70–99)
GLUCOSE BLDC GLUCOMTR-MCNC: 45 MG/DL — CRITICAL LOW (ref 70–99)
GLUCOSE BLDC GLUCOMTR-MCNC: 88 MG/DL — SIGNIFICANT CHANGE UP (ref 70–99)
GLUCOSE SERPL-MCNC: 310 MG/DL — HIGH (ref 70–115)
HCT VFR BLD CALC: 37.3 % — LOW (ref 39–50)
HGB BLD-MCNC: 11.7 G/DL — LOW (ref 13–17)
MAGNESIUM SERPL-MCNC: 2.2 MG/DL — SIGNIFICANT CHANGE UP (ref 1.6–2.6)
MCHC RBC-ENTMCNC: 28.3 PG — SIGNIFICANT CHANGE UP (ref 27–34)
MCHC RBC-ENTMCNC: 31.4 GM/DL — LOW (ref 32–36)
MCV RBC AUTO: 90.3 FL — SIGNIFICANT CHANGE UP (ref 80–100)
PHOSPHATE SERPL-MCNC: 1.8 MG/DL — LOW (ref 2.4–4.7)
PLATELET # BLD AUTO: 165 K/UL — SIGNIFICANT CHANGE UP (ref 150–400)
POTASSIUM SERPL-MCNC: 4.3 MMOL/L — SIGNIFICANT CHANGE UP (ref 3.5–5.3)
POTASSIUM SERPL-SCNC: 4.3 MMOL/L — SIGNIFICANT CHANGE UP (ref 3.5–5.3)
PROT SERPL-MCNC: 7.1 G/DL — SIGNIFICANT CHANGE UP (ref 6.6–8.7)
RBC # BLD: 4.13 M/UL — LOW (ref 4.2–5.8)
RBC # FLD: 12.8 % — SIGNIFICANT CHANGE UP (ref 10.3–14.5)
SODIUM SERPL-SCNC: 149 MMOL/L — HIGH (ref 135–145)
WBC # BLD: 5.89 K/UL — SIGNIFICANT CHANGE UP (ref 3.8–10.5)
WBC # FLD AUTO: 5.89 K/UL — SIGNIFICANT CHANGE UP (ref 3.8–10.5)

## 2019-12-04 PROCEDURE — 99232 SBSQ HOSP IP/OBS MODERATE 35: CPT

## 2019-12-04 PROCEDURE — 71045 X-RAY EXAM CHEST 1 VIEW: CPT | Mod: 26

## 2019-12-04 RX ORDER — INFLUENZA VIRUS VACCINE 15; 15; 15; 15 UG/.5ML; UG/.5ML; UG/.5ML; UG/.5ML
0.5 SUSPENSION INTRAMUSCULAR ONCE
Refills: 0 | Status: DISCONTINUED | OUTPATIENT
Start: 2019-12-04 | End: 2019-12-07

## 2019-12-04 RX ORDER — LISINOPRIL 2.5 MG/1
20 TABLET ORAL DAILY
Refills: 0 | Status: DISCONTINUED | OUTPATIENT
Start: 2019-12-04 | End: 2019-12-07

## 2019-12-04 RX ORDER — POTASSIUM PHOSPHATE, MONOBASIC POTASSIUM PHOSPHATE, DIBASIC 236; 224 MG/ML; MG/ML
15 INJECTION, SOLUTION INTRAVENOUS ONCE
Refills: 0 | Status: COMPLETED | OUTPATIENT
Start: 2019-12-04 | End: 2019-12-04

## 2019-12-04 RX ORDER — DEXTROSE 50 % IN WATER 50 %
15 SYRINGE (ML) INTRAVENOUS ONCE
Refills: 0 | Status: COMPLETED | OUTPATIENT
Start: 2019-12-04 | End: 2019-12-04

## 2019-12-04 RX ORDER — SODIUM CHLORIDE 9 MG/ML
1000 INJECTION, SOLUTION INTRAVENOUS
Refills: 0 | Status: DISCONTINUED | OUTPATIENT
Start: 2019-12-04 | End: 2019-12-07

## 2019-12-04 RX ADMIN — Medication 2: at 16:55

## 2019-12-04 RX ADMIN — HEPARIN SODIUM 5000 UNIT(S): 5000 INJECTION INTRAVENOUS; SUBCUTANEOUS at 22:34

## 2019-12-04 RX ADMIN — POTASSIUM PHOSPHATE, MONOBASIC POTASSIUM PHOSPHATE, DIBASIC 62.5 MILLIMOLE(S): 236; 224 INJECTION, SOLUTION INTRAVENOUS at 10:41

## 2019-12-04 RX ADMIN — Medication 81 MILLIGRAM(S): at 11:34

## 2019-12-04 RX ADMIN — SODIUM CHLORIDE 80 MILLILITER(S): 9 INJECTION INTRAMUSCULAR; INTRAVENOUS; SUBCUTANEOUS at 05:20

## 2019-12-04 RX ADMIN — LISINOPRIL 20 MILLIGRAM(S): 2.5 TABLET ORAL at 18:37

## 2019-12-04 RX ADMIN — SODIUM CHLORIDE 80 MILLILITER(S): 9 INJECTION, SOLUTION INTRAVENOUS at 10:41

## 2019-12-04 RX ADMIN — HEPARIN SODIUM 5000 UNIT(S): 5000 INJECTION INTRAVENOUS; SUBCUTANEOUS at 05:18

## 2019-12-04 RX ADMIN — SIMVASTATIN 40 MILLIGRAM(S): 20 TABLET, FILM COATED ORAL at 22:34

## 2019-12-04 RX ADMIN — GABAPENTIN 100 MILLIGRAM(S): 400 CAPSULE ORAL at 11:34

## 2019-12-04 RX ADMIN — Medication 10 MILLIGRAM(S): at 11:35

## 2019-12-04 RX ADMIN — Medication 3: at 08:16

## 2019-12-04 RX ADMIN — HEPARIN SODIUM 5000 UNIT(S): 5000 INJECTION INTRAVENOUS; SUBCUTANEOUS at 15:08

## 2019-12-04 RX ADMIN — Medication 10 MILLIGRAM(S): at 18:37

## 2019-12-04 RX ADMIN — Medication 4: at 11:32

## 2019-12-04 RX ADMIN — Medication 10 MILLIGRAM(S): at 05:19

## 2019-12-04 RX ADMIN — Medication 15 GRAM(S): at 23:15

## 2019-12-04 NOTE — SWALLOW BEDSIDE ASSESSMENT ADULT - COMMENTS
As per MD note:   "89yoM Creole speaking, hx HTN, DM, recently diagnosed with Afib and gastric mass at OSH presenting with nausea and vomiting.  Pt is poor historian despite use of Creole .  Collateral hx obtained via phone from daughter Lisset ([phone]).  Pt reports having generalized abdominal pain associated with nausea, non-bilious, non-bloody vomiting.  He is unable to specify how long he has been having these symptoms. Pt denies any fevers, chills or diarrhea. Pt went to 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  Pt was started on Flagyl by Good José Manuel."

## 2019-12-04 NOTE — PROGRESS NOTE ADULT - SUBJECTIVE AND OBJECTIVE BOX
Patient seen and examined; chart reviewed.  No vomiting today;  feels better.  Now DNR status.  Hydration working.  Labs better.  Seems comfortable.      PAST MEDICAL & SURGICAL HISTORY:  Neuropathy  Hypertension  A-fib  Diabetes  No significant past surgical history      ROS:  No Heartburn, regurgitation, dysphagia, odynophagia.  No dyspepsia  No abdominal pain.    No Nausea, vomiting.  No Bleeding.  No hematemesis.   No diarrhea.    No hematochesia.  No weight loss, anorexia.  No edema.      MEDICATIONS  (STANDING):  aspirin enteric coated 81 milliGRAM(s) Oral daily  dextrose 5%. 1000 milliLiter(s) (50 mL/Hr) IV Continuous <Continuous>  dextrose 50% Injectable 12.5 Gram(s) IV Push once  dextrose 50% Injectable 25 Gram(s) IV Push once  dextrose 50% Injectable 25 Gram(s) IV Push once  dorzolamide 2%/timolol 0.5% Ophthalmic Solution 1 Drop(s) Both EYES two times a day  gabapentin 100 milliGRAM(s) Oral daily  heparin  Injectable 5000 Unit(s) SubCutaneous every 8 hours  insulin lispro (HumaLOG) corrective regimen sliding scale   SubCutaneous three times a day before meals  insulin lispro (HumaLOG) corrective regimen sliding scale   SubCutaneous at bedtime  metoclopramide Injectable 10 milliGRAM(s) IV Push every 6 hours  potassium phosphate IVPB 15 milliMole(s) IV Intermittent once  simvastatin 40 milliGRAM(s) Oral at bedtime  sodium chloride 0.45%. 1000 milliLiter(s) (80 mL/Hr) IV Continuous <Continuous>    MEDICATIONS  (PRN):  dextrose 40% Gel 15 Gram(s) Oral once PRN Blood Glucose LESS THAN 70 milliGRAM(s)/deciliter  glucagon  Injectable 1 milliGRAM(s) IntraMuscular once PRN Glucose LESS THAN 70 milligrams/deciliter  ondansetron Injectable 4 milliGRAM(s) IV Push every 6 hours PRN Nausea and/or Vomiting      Allergies    No Known Allergies    Intolerances        Vital Signs Last 24 Hrs  T(C): 36.6 (04 Dec 2019 07:38), Max: 36.9 (03 Dec 2019 15:34)  T(F): 97.8 (04 Dec 2019 07:38), Max: 98.4 (03 Dec 2019 15:34)  HR: 81 (04 Dec 2019 07:38) (79 - 103)  BP: 150/80 (04 Dec 2019 07:38) (125/87 - 155/79)  BP(mean): --  RR: 18 (04 Dec 2019 07:38) (18 - 18)  SpO2: 97% (04 Dec 2019 07:38) (97% - 100%)    PHYSICAL EXAM:    GENERAL: NAD, well-groomed, well-developed  HEAD:  Atraumatic, Normocephalic  EYES: EOMI, PERRLA, conjunctiva and sclera clear  ENMT: No tonsillar erythema, exudates, or enlargement; Moist mucous membranes, Good dentition, No lesions  NECK: Supple, No JVD, Normal thyroid  CHEST/LUNG: Clear to percussion bilaterally; No rales, rhonchi, wheezing, or rubs  HEART: Regular rate and rhythm; No murmurs, rubs, or gallops  ABDOMEN: Soft, Nontender, Nondistended; Bowel sounds present  EXTREMITIES:  2+ Peripheral Pulses, No clubbing, cyanosis, or edema  LYMPH: No lymphadenopathy noted  SKIN: No rashes or lesions      LABS:                        11.7   5.89  )-----------( 165      ( 04 Dec 2019 07:30 )             37.3     12-04    149<H>  |  116<H>  |  29.0<H>  ----------------------------<  310<H>  4.3   |  22.0  |  1.14    Ca    10.6<H>      04 Dec 2019 07:30  Phos  1.8     12-04  Mg     2.2     12-04    TPro  7.1  /  Alb  3.3  /  TBili  0.9  /  DBili  x   /  AST  12  /  ALT  11  /  AlkPhos  53  12-04       Urinalysis Basic - ( 02 Dec 2019 20:10 )    Color: Yellow / Appearance: Clear / S.025 / pH: x  Gluc: x / Ketone: Small  / Bili: Negative / Urobili: Negative mg/dL   Blood: x / Protein: 30 mg/dL / Nitrite: Negative   Leuk Esterase: Trace / RBC: 0-2 /HPF / WBC 0-2   Sq Epi: x / Non Sq Epi: Occasional / Bacteria: x          RADIOLOGY & ADDITIONAL STUDIES:

## 2019-12-04 NOTE — PROGRESS NOTE ADULT - SUBJECTIVE AND OBJECTIVE BOX
JOHNATHON SEVERINO    [id number]    89y      Male    Patient is a 89y old  Male who presents with a chief complaint of intractable nausea and vomiting, gastric mass (04 Dec 2019 09:21)      INTERVAL HPI/OVERNIGHT EVENTS:    Patient has no more Nausea, vomiting, has some epigastric pain, Hx if limited as patient is poor historian,     REVIEW OF SYSTEMS:    limited info       Vital Signs Last 24 Hrs  T(C): 36.9 (04 Dec 2019 11:15), Max: 36.9 (03 Dec 2019 15:34)  T(F): 98.4 (04 Dec 2019 11:15), Max: 98.4 (03 Dec 2019 15:34)  HR: 74 (04 Dec 2019 11:15) (74 - 92)  BP: 141/83 (04 Dec 2019 11:15) (134/63 - 155/79)  RR: 18 (04 Dec 2019 11:15) (18 - 18)  SpO2: 99% (04 Dec 2019 11:15) (97% - 100%)    PHYSICAL EXAM:    GENERAL: Elderly male looking comfortable   HEENT: Atraumatic   NECK: soft, Supple, No JVD  CHEST/LUNG: Clear to auscultate bilaterally; No wheezing   HEART: S1S2+, Regular rate and rhythm; No murmurs  ABDOMEN: Soft, Nontender, Nondistended; Bowel sounds present  EXTREMITIES:  1+ Peripheral Pulses, No edema  SKIN: No rashes or lesions  NEURO: OX1, no focal deficits, no motor r sensory loss  PSYCH: normal mood.       LABS:                        11.7   5.89  )-----------( 165      ( 04 Dec 2019 07:30 )             37.3     12-04    149<H>  |  116<H>  |  29.0<H>  ----------------------------<  310<H>  4.3   |  22.0  |  1.14    Ca    10.6<H>      04 Dec 2019 07:30  Phos  1.8     12-04  Mg     2.2     12-04    TPro  7.1  /  Alb  3.3  /  TBili  0.9  /  DBili  x   /  AST  12  /  ALT  11  /  AlkPhos  53  12-04      Urinalysis Basic - ( 02 Dec 2019 20:10 )    Color: Yellow / Appearance: Clear / S.025 / pH: x  Gluc: x / Ketone: Small  / Bili: Negative / Urobili: Negative mg/dL   Blood: x / Protein: 30 mg/dL / Nitrite: Negative   Leuk Esterase: Trace / RBC: 0-2 /HPF / WBC 0-2   Sq Epi: x / Non Sq Epi: Occasional / Bacteria: x          I&O's Summary      MEDICATIONS  (STANDING):  aspirin enteric coated 81 milliGRAM(s) Oral daily  dextrose 5%. 1000 milliLiter(s) (50 mL/Hr) IV Continuous <Continuous>  dextrose 50% Injectable 12.5 Gram(s) IV Push once  dextrose 50% Injectable 25 Gram(s) IV Push once  dextrose 50% Injectable 25 Gram(s) IV Push once  dorzolamide 2%/timolol 0.5% Ophthalmic Solution 1 Drop(s) Both EYES two times a day  gabapentin 100 milliGRAM(s) Oral daily  heparin  Injectable 5000 Unit(s) SubCutaneous every 8 hours  insulin lispro (HumaLOG) corrective regimen sliding scale   SubCutaneous three times a day before meals  insulin lispro (HumaLOG) corrective regimen sliding scale   SubCutaneous at bedtime  metoclopramide Injectable 10 milliGRAM(s) IV Push every 6 hours  simvastatin 40 milliGRAM(s) Oral at bedtime  sodium chloride 0.45%. 1000 milliLiter(s) (80 mL/Hr) IV Continuous <Continuous>    MEDICATIONS  (PRN):  dextrose 40% Gel 15 Gram(s) Oral once PRN Blood Glucose LESS THAN 70 milliGRAM(s)/deciliter  glucagon  Injectable 1 milliGRAM(s) IntraMuscular once PRN Glucose LESS THAN 70 milligrams/deciliter  ondansetron Injectable 4 milliGRAM(s) IV Push every 6 hours PRN Nausea and/or Vomiting JOHNATHON SEVERINO    [id number]    89y      Male    Patient is a 89y old  Male who presents with a chief complaint of intractable nausea and vomiting, gastric mass (04 Dec 2019 09:21)      INTERVAL HPI/OVERNIGHT EVENTS:    Patient has no more Nausea, vomiting, has some epigastric pain, Hx if limited as patient is poor historian,     REVIEW OF SYSTEMS:    limited info       Vital Signs Last 24 Hrs  T(C): 36.9 (04 Dec 2019 11:15), Max: 36.9 (03 Dec 2019 15:34)  T(F): 98.4 (04 Dec 2019 11:15), Max: 98.4 (03 Dec 2019 15:34)  HR: 74 (04 Dec 2019 11:15) (74 - 92)  BP: 141/83 (04 Dec 2019 11:15) (134/63 - 155/79)  RR: 18 (04 Dec 2019 11:15) (18 - 18)  SpO2: 99% (04 Dec 2019 11:15) (97% - 100%)    PHYSICAL EXAM:    GENERAL: Elderly male looking comfortable   HEENT: Atraumatic   NECK: soft, Supple, No JVD  CHEST/LUNG: Decrease air entry bilaterally; No wheezing   HEART: S1S2+, Regular rate and rhythm; No murmurs  ABDOMEN: Soft, epigastric tenderness, Nondistended; Bowel sounds present  EXTREMITIES:  1+ Peripheral Pulses, No edema  SKIN: No rashes or lesions  NEURO: OX1, no focal deficits, no motor r sensory loss  PSYCH: normal mood.       LABS:                        11.7   5.89  )-----------( 165      ( 04 Dec 2019 07:30 )             37.3     12-04    149<H>  |  116<H>  |  29.0<H>  ----------------------------<  310<H>  4.3   |  22.0  |  1.14    Ca    10.6<H>      04 Dec 2019 07:30  Phos  1.8     12-04  Mg     2.2     12-04    TPro  7.1  /  Alb  3.3  /  TBili  0.9  /  DBili  x   /  AST  12  /  ALT  11  /  AlkPhos  53  12-04      Urinalysis Basic - ( 02 Dec 2019 20:10 )    Color: Yellow / Appearance: Clear / S.025 / pH: x  Gluc: x / Ketone: Small  / Bili: Negative / Urobili: Negative mg/dL   Blood: x / Protein: 30 mg/dL / Nitrite: Negative   Leuk Esterase: Trace / RBC: 0-2 /HPF / WBC 0-2   Sq Epi: x / Non Sq Epi: Occasional / Bacteria: x          I&O's Summary      MEDICATIONS  (STANDING):  aspirin enteric coated 81 milliGRAM(s) Oral daily  dextrose 5%. 1000 milliLiter(s) (50 mL/Hr) IV Continuous <Continuous>  dextrose 50% Injectable 12.5 Gram(s) IV Push once  dextrose 50% Injectable 25 Gram(s) IV Push once  dextrose 50% Injectable 25 Gram(s) IV Push once  dorzolamide 2%/timolol 0.5% Ophthalmic Solution 1 Drop(s) Both EYES two times a day  gabapentin 100 milliGRAM(s) Oral daily  heparin  Injectable 5000 Unit(s) SubCutaneous every 8 hours  insulin lispro (HumaLOG) corrective regimen sliding scale   SubCutaneous three times a day before meals  insulin lispro (HumaLOG) corrective regimen sliding scale   SubCutaneous at bedtime  metoclopramide Injectable 10 milliGRAM(s) IV Push every 6 hours  simvastatin 40 milliGRAM(s) Oral at bedtime  sodium chloride 0.45%. 1000 milliLiter(s) (80 mL/Hr) IV Continuous <Continuous>    MEDICATIONS  (PRN):  dextrose 40% Gel 15 Gram(s) Oral once PRN Blood Glucose LESS THAN 70 milliGRAM(s)/deciliter  glucagon  Injectable 1 milliGRAM(s) IntraMuscular once PRN Glucose LESS THAN 70 milligrams/deciliter  ondansetron Injectable 4 milliGRAM(s) IV Push every 6 hours PRN Nausea and/or Vomiting

## 2019-12-04 NOTE — SWALLOW BEDSIDE ASSESSMENT ADULT - SLP GENERAL OBSERVATIONS
Pt received & seen seated upright via stretcher in ED, +awake/alert, +reduced cognition, +IPAD language line ID: [id number] used for Citizen of [place] Creole, 0/10 pain

## 2019-12-04 NOTE — PROGRESS NOTE ADULT - ASSESSMENT
89yoM Creole speaking, hx HTN, DM, recently diagnosed with Afib and gastric mass at OSH presenting with nausea and vomiting in setting of gastric mass    Intractable nausea and vomiting in setting of gastric mass  -CT abd/pelvis showing exophytic mass concerning for neoplasm  -Anti-emetics PRN  -Start gentle IVF  -Clear liquid diet, advance as tolerated  -Daughter aware of mass, informed her that per radiology report, there is concern for neoplasm  -Not sure if pt would be candidate for endoscopy given advanced age, but daughter requesting GI eval  -GI consulted  -Will hold off on resuming Flagyl as no diarrhea or systemic signs of infection     VINCE  -Likely pre-renal from GI losses  -Started on IVF, monitor Cr   -Holding Lisinopril     Hypercalcemia  -Repeat CA in AM, check PTH  -Holding HCTZ    Gastric mass    HTN  -Holding Lisinopril due to VINCE and HCTZ given hypercalcemia    DM  -Holding sulfonylurea   -Insulin sliding scale    Paroxysmal Afib  -Per daughter, had episode of Afib at OSH, not placed on any AC, presumably due to advanced age  -Monitor    Medication management  -Med rec left incomplete, partially done from meds listed in prescription writer  -Can call [facility] in     Prophylactic measure  -Heparin 89yoM Creole speaking, hx HTN, DM, recently diagnosed with Afib and gastric mass at OSH presenting with nausea and vomiting in setting of gastric mass    Intractable nausea and vomiting in setting of gastric mass:   -CT abd/pelvis showing exophytic mass concerning for neoplasm, Anti-emetics PRN  -was on IVF NS, his sodium is going up, will change to half normal saline,   -Clear liquid diet tolerated well, advance to regular diet, seen by speech and swallow team  -Daughter aware of mass, informed her that per radiology report, there is concern for neoplasm, she does not want any invasive testing, given he is not candidate for surgical intervention/chemo/radiation, GI consult appreciated, will d/c Flagyl as no diarrhea or systemic signs of infection.      VINCE:   -Likely pre-renal from GI losses, better now, will change IV fluids to hald normal saline, will resume lisinopril     Hypernatremia: likely from saline IV fluids, changed to half normal, will monitor BMP.     Hypercalcemia:   -Repeat CA in AM, check PTH,  Calcium level is coming down, Holding HCTZ    Gastric mass: as above     HTN  -Holding Lisinopril due to VINCE and HCTZ given hypercalcemia    DM  -Holding sulfonylurea   -Insulin sliding scale    Paroxysmal Afib:   -Per daughter, had episode of Afib at OSH, not placed on any AC, presumably due to advanced age      Medication management  will confirm from the daughter     Prophylactic measure  -Heparin    Code Status: DNR/DNI Palliative care team on board

## 2019-12-04 NOTE — PROGRESS NOTE ADULT - ASSESSMENT
Improved hydration.  Bun downtrending.  Vomiting has stopped.  ? Reglan working.  Not overtly septic.    Will defer on endoscopic procedures.  Ordered trial of DASH diet.  Follow lytes.

## 2019-12-05 DIAGNOSIS — Z71.89 OTHER SPECIFIED COUNSELING: ICD-10-CM

## 2019-12-05 LAB
ANION GAP SERPL CALC-SCNC: 12 MMOL/L — SIGNIFICANT CHANGE UP (ref 5–17)
BUN SERPL-MCNC: 16 MG/DL — SIGNIFICANT CHANGE UP (ref 8–20)
CALCIUM SERPL-MCNC: 10.4 MG/DL — HIGH (ref 8.6–10.2)
CHLORIDE SERPL-SCNC: 109 MMOL/L — HIGH (ref 98–107)
CO2 SERPL-SCNC: 24 MMOL/L — SIGNIFICANT CHANGE UP (ref 22–29)
CREAT SERPL-MCNC: 0.98 MG/DL — SIGNIFICANT CHANGE UP (ref 0.5–1.3)
GLUCOSE BLDC GLUCOMTR-MCNC: 165 MG/DL — HIGH (ref 70–99)
GLUCOSE BLDC GLUCOMTR-MCNC: 223 MG/DL — HIGH (ref 70–99)
GLUCOSE BLDC GLUCOMTR-MCNC: 256 MG/DL — HIGH (ref 70–99)
GLUCOSE BLDC GLUCOMTR-MCNC: 324 MG/DL — HIGH (ref 70–99)
GLUCOSE SERPL-MCNC: 179 MG/DL — HIGH (ref 70–115)
HCT VFR BLD CALC: 37.3 % — LOW (ref 39–50)
HGB BLD-MCNC: 11.6 G/DL — LOW (ref 13–17)
MCHC RBC-ENTMCNC: 28.1 PG — SIGNIFICANT CHANGE UP (ref 27–34)
MCHC RBC-ENTMCNC: 31.1 GM/DL — LOW (ref 32–36)
MCV RBC AUTO: 90.3 FL — SIGNIFICANT CHANGE UP (ref 80–100)
PLATELET # BLD AUTO: 192 K/UL — SIGNIFICANT CHANGE UP (ref 150–400)
POTASSIUM SERPL-MCNC: 4.4 MMOL/L — SIGNIFICANT CHANGE UP (ref 3.5–5.3)
POTASSIUM SERPL-SCNC: 4.4 MMOL/L — SIGNIFICANT CHANGE UP (ref 3.5–5.3)
RBC # BLD: 4.13 M/UL — LOW (ref 4.2–5.8)
RBC # FLD: 12.8 % — SIGNIFICANT CHANGE UP (ref 10.3–14.5)
SODIUM SERPL-SCNC: 145 MMOL/L — SIGNIFICANT CHANGE UP (ref 135–145)
WBC # BLD: 5.97 K/UL — SIGNIFICANT CHANGE UP (ref 3.8–10.5)
WBC # FLD AUTO: 5.97 K/UL — SIGNIFICANT CHANGE UP (ref 3.8–10.5)

## 2019-12-05 PROCEDURE — 99232 SBSQ HOSP IP/OBS MODERATE 35: CPT

## 2019-12-05 RX ORDER — ACETAMINOPHEN 500 MG
650 TABLET ORAL EVERY 8 HOURS
Refills: 0 | Status: DISCONTINUED | OUTPATIENT
Start: 2019-12-05 | End: 2019-12-07

## 2019-12-05 RX ORDER — PANTOPRAZOLE SODIUM 20 MG/1
40 TABLET, DELAYED RELEASE ORAL DAILY
Refills: 0 | Status: DISCONTINUED | OUTPATIENT
Start: 2019-12-05 | End: 2019-12-07

## 2019-12-05 RX ORDER — ACETAMINOPHEN 500 MG
650 TABLET ORAL EVERY 6 HOURS
Refills: 0 | Status: DISCONTINUED | OUTPATIENT
Start: 2019-12-05 | End: 2019-12-07

## 2019-12-05 RX ORDER — OXYCODONE HYDROCHLORIDE 5 MG/1
5 TABLET ORAL
Refills: 0 | Status: DISCONTINUED | OUTPATIENT
Start: 2019-12-05 | End: 2019-12-07

## 2019-12-05 RX ORDER — OXYCODONE HYDROCHLORIDE 5 MG/1
2.5 TABLET ORAL
Refills: 0 | Status: DISCONTINUED | OUTPATIENT
Start: 2019-12-05 | End: 2019-12-07

## 2019-12-05 RX ORDER — INSULIN GLARGINE 100 [IU]/ML
20 INJECTION, SOLUTION SUBCUTANEOUS AT BEDTIME
Refills: 0 | Status: DISCONTINUED | OUTPATIENT
Start: 2019-12-05 | End: 2019-12-07

## 2019-12-05 RX ORDER — ACETAMINOPHEN 500 MG
1000 TABLET ORAL ONCE
Refills: 0 | Status: COMPLETED | OUTPATIENT
Start: 2019-12-05 | End: 2019-12-05

## 2019-12-05 RX ADMIN — INSULIN GLARGINE 20 UNIT(S): 100 INJECTION, SOLUTION SUBCUTANEOUS at 21:03

## 2019-12-05 RX ADMIN — Medication 1000 MILLIGRAM(S): at 18:11

## 2019-12-05 RX ADMIN — Medication 400 MILLIGRAM(S): at 16:58

## 2019-12-05 RX ADMIN — SODIUM CHLORIDE 80 MILLILITER(S): 9 INJECTION, SOLUTION INTRAVENOUS at 12:19

## 2019-12-05 RX ADMIN — HEPARIN SODIUM 5000 UNIT(S): 5000 INJECTION INTRAVENOUS; SUBCUTANEOUS at 07:36

## 2019-12-05 RX ADMIN — Medication 10 MILLIGRAM(S): at 12:20

## 2019-12-05 RX ADMIN — DORZOLAMIDE HYDROCHLORIDE TIMOLOL MALEATE 1 DROP(S): 20; 5 SOLUTION/ DROPS OPHTHALMIC at 16:58

## 2019-12-05 RX ADMIN — Medication 4: at 16:58

## 2019-12-05 RX ADMIN — SODIUM CHLORIDE 80 MILLILITER(S): 9 INJECTION, SOLUTION INTRAVENOUS at 21:04

## 2019-12-05 RX ADMIN — Medication 10 MILLIGRAM(S): at 07:36

## 2019-12-05 RX ADMIN — Medication 10 MILLIGRAM(S): at 16:59

## 2019-12-05 RX ADMIN — HEPARIN SODIUM 5000 UNIT(S): 5000 INJECTION INTRAVENOUS; SUBCUTANEOUS at 21:04

## 2019-12-05 RX ADMIN — GABAPENTIN 100 MILLIGRAM(S): 400 CAPSULE ORAL at 12:19

## 2019-12-05 RX ADMIN — Medication 1: at 07:56

## 2019-12-05 RX ADMIN — SIMVASTATIN 40 MILLIGRAM(S): 20 TABLET, FILM COATED ORAL at 21:04

## 2019-12-05 RX ADMIN — PANTOPRAZOLE SODIUM 40 MILLIGRAM(S): 20 TABLET, DELAYED RELEASE ORAL at 16:58

## 2019-12-05 RX ADMIN — HEPARIN SODIUM 5000 UNIT(S): 5000 INJECTION INTRAVENOUS; SUBCUTANEOUS at 12:19

## 2019-12-05 RX ADMIN — Medication 10 MILLIGRAM(S): at 02:18

## 2019-12-05 RX ADMIN — Medication 650 MILLIGRAM(S): at 21:04

## 2019-12-05 RX ADMIN — Medication 10 MILLIGRAM(S): at 23:37

## 2019-12-05 RX ADMIN — Medication 1: at 21:03

## 2019-12-05 RX ADMIN — SODIUM CHLORIDE 80 MILLILITER(S): 9 INJECTION, SOLUTION INTRAVENOUS at 02:25

## 2019-12-05 RX ADMIN — LISINOPRIL 20 MILLIGRAM(S): 2.5 TABLET ORAL at 07:36

## 2019-12-05 RX ADMIN — Medication 81 MILLIGRAM(S): at 12:19

## 2019-12-05 RX ADMIN — Medication 2: at 12:20

## 2019-12-05 RX ADMIN — Medication 650 MILLIGRAM(S): at 22:04

## 2019-12-05 NOTE — PHYSICAL THERAPY INITIAL EVALUATION ADULT - CRITERIA FOR SKILLED THERAPEUTIC INTERVENTIONS
functional limitations in following categories/predicted duration of therapy intervention/risk reduction/prevention/anticipated equipment needs at discharge/anticipated discharge recommendation/impairments found/rehab potential/therapy frequency

## 2019-12-05 NOTE — PHYSICAL THERAPY INITIAL EVALUATION ADULT - LEVEL OF INDEPENDENCE: SIT/SUPINE, REHAB EVAL
unable to perform/pt was observed and returned sitting in chair with chair alarm, will require further assessment

## 2019-12-05 NOTE — PROGRESS NOTE ADULT - PROBLEM SELECTOR PLAN 1
Patient 's family does not want egd/ invasive procedures  - tolerating diet  - palliative care  - will sign off

## 2019-12-05 NOTE — PHYSICAL THERAPY INITIAL EVALUATION ADULT - GENERAL OBSERVATIONS, REHAB EVAL
Pt observed sitting in chair with chair alarm, A & O, [place] creole speaking, pleasant, cooperative and c/o "pain all over", unable to provide accurate level of pain

## 2019-12-05 NOTE — PROGRESS NOTE ADULT - SUBJECTIVE AND OBJECTIVE BOX
INTERVAL HPI/OVERNIGHT EVENTS: 89yo Male Patient   89y old  Male who presents with a chief complaint of intractable nausea and vomiting, gastric mass (05 Dec 2019 14:58)    F/U Note    Seen earlier at lunch , Creole speaking nurse aid assisted with translation  He is OOB/CH full lunch tray- not able to eat.  Not feeling well nods his head in pain LLE leg ulcer wrapped causing pain  Grimacing and frustrated No fever SOB, CP, Palpitations N/V or coughing jags.    PAST MEDICAL & SURGICAL HISTORY:  Neuropathy  Hypertension  A-fib  Diabetes  No significant past surgical history    Present Symptoms:     Dyspnea: 0   Nausea/: Yes   Anxiety:  Yes   Depression: Yes   Fatigue: Yes   Loss of appetite: Yes     Pain: abdominal and LLE            Character-            Duration-            Effect-            Factors-            Frequency-            Location-            Severity-    Review of Systems: Reviewed                      All others negative    MEDICATIONS  (STANDING):  aspirin enteric coated 81 milliGRAM(s) Oral daily  dextrose 5%. 1000 milliLiter(s) (50 mL/Hr) IV Continuous <Continuous>  dextrose 50% Injectable 12.5 Gram(s) IV Push once  dextrose 50% Injectable 25 Gram(s) IV Push once  dextrose 50% Injectable 25 Gram(s) IV Push once  dorzolamide 2%/timolol 0.5% Ophthalmic Solution 1 Drop(s) Both EYES two times a day  gabapentin 100 milliGRAM(s) Oral daily  heparin  Injectable 5000 Unit(s) SubCutaneous every 8 hours  influenza   Vaccine 0.5 milliLiter(s) IntraMuscular once  insulin lispro (HumaLOG) corrective regimen sliding scale   SubCutaneous three times a day before meals  insulin lispro (HumaLOG) corrective regimen sliding scale   SubCutaneous at bedtime  lisinopril 20 milliGRAM(s) Oral daily  metoclopramide Injectable 10 milliGRAM(s) IV Push every 6 hours  pantoprazole  Injectable 40 milliGRAM(s) IV Push daily  simvastatin 40 milliGRAM(s) Oral at bedtime  sodium chloride 0.45%. 1000 milliLiter(s) (80 mL/Hr) IV Continuous <Continuous>    MEDICATIONS  (PRN):  acetaminophen   Tablet .. 650 milliGRAM(s) Oral every 6 hours PRN Moderate Pain (4 - 6)  dextrose 40% Gel 15 Gram(s) Oral once PRN Blood Glucose LESS THAN 70 milliGRAM(s)/deciliter  glucagon  Injectable 1 milliGRAM(s) IntraMuscular once PRN Glucose LESS THAN 70 milligrams/deciliter  ondansetron Injectable 4 milliGRAM(s) IV Push every 6 hours PRN Nausea and/or Vomiting      PHYSICAL EXAM:    Vital Signs Last 24 Hrs  T(C): 36.8 (05 Dec 2019 09:00), Max: 36.8 (04 Dec 2019 17:32)  T(F): 98.3 (05 Dec 2019 09:00), Max: 98.3 (04 Dec 2019 17:32)  HR: 62 (05 Dec 2019 09:00) (62 - 74)  BP: 122/73 (05 Dec 2019 09:00) (122/73 - 148/82)  BP(mean): --  RR: 20 (05 Dec 2019 09:00) (16 - 20)  SpO2: 94% (05 Dec 2019 09:00) (94% - 100%)    General: alert ____ awake, anxious and uncomfortable             HEENT: normal     Lungs: comfortable    CV: normal     GI: nodistended  tender           constipation  last BM:     :   incontinent      MSK:   weakness  edema             ambulatory OOB/CH    Skin: normal Diabetic ulcer lower extremity_  no rash    LABS:                        11.6   5.97  )-----------( 192      ( 05 Dec 2019 07:54 )             37.3     12-05    145  |  109<H>  |  16.0  ----------------------------<  179<H>  4.4   |  24.0  |  0.98    Ca    10.4<H>      05 Dec 2019 07:54  Phos  1.8     12-04  Mg     2.2     12-04    TPro  7.1  /  Alb  3.3  /  TBili  0.9  /  DBili  x   /  AST  12  /  ALT  11  /  AlkPhos  53  12-04    I&O's Summary    05 Dec 2019 07:01  -  05 Dec 2019 15:57  --------------------------------------------------------  IN: 0 mL / OUT: 0 mL / NET: 0 mL    RADIOLOGY & ADDITIONAL STUDIES:    ADVANCE DIRECTIVES:   DNR YES   Completed on:                     MOLST  YES    Completed on:  Living Will  NO   Completed on:

## 2019-12-05 NOTE — PROGRESS NOTE ADULT - ASSESSMENT
89yoM Creole speaking, hx HTN, DM, recently diagnosed with Afib and gastric mass at OSH presenting with nausea and vomiting in setting of gastric mass    1-Intractable nausea and vomiting in setting of gastric mass:   -CT abd/pelvis showing exophytic mass concerning for neoplasm  cont antiemetic   GI follow up appreciated   -Daughter aware of mass, informed her that per radiology report, there is concern for neoplasm, she does not want any invasive testing, given he is not candidate for surgical intervention/chemo/radiation,       2-VINCE:   -Likely pre-renal from GI losses, resolved         3-Hypercalcemia:   -Repeat CA noted  check PTH,  Calcium level is coming down, Holding HCTZ      4-HTN  on lisinopril controlled       5-DM- controlled   -Holding sulfonylurea   -Insulin sliding scale    6-Paroxysmal Afib:   -Per daughter, had episode of Afib at OSH, not placed on any AC, presumably due to advanced age      Code Status: DNR/DNI Palliative care team on board

## 2019-12-05 NOTE — PHYSICAL THERAPY INITIAL EVALUATION ADULT - DISCHARGE DISPOSITION, PT EVAL
[facility] vs home with 24/7 assist, RW and home PT pending progress and pending stair assessment. pt has to be able to negotiate 3 DAMON + 10 steps to 2nd floor bed and bath/home w/ home PT/rehabilitation facility/home w/ assist

## 2019-12-05 NOTE — PHYSICAL THERAPY INITIAL EVALUATION ADULT - IMPAIRMENTS FOUND, PT EVAL
poor safety awareness/aerobic capacity/endurance/gross motor/gait, locomotion, and balance/muscle strength/ROM/posture/arousal, attention, and cognition

## 2019-12-05 NOTE — PROGRESS NOTE ADULT - ATTENDING COMMENTS
COUNSELING:    Face to face meeting to discuss Advanced Care Planning - Time Spent ______ Minutes.  See goals of care note.    More than 50% time spent in counseling and coordinating care. ______ Minutes.     Thank you for the opportunity to assist with the care of this patient.   [facility] [phone].

## 2019-12-05 NOTE — PROGRESS NOTE ADULT - SUBJECTIVE AND OBJECTIVE BOX
JOHNATHON SEVERINO    [id number]    89y      Male    Patient is a 89y old  Male who presents with a chief complaint of intractable nausea and vomiting, gastric mass    seen earlier today , he is seen with Creole speaking nurse aid   he is resting in the bed , c/o having pain all over abd leg   no distress noted       Vital Signs Last 24 Hrs  T(C): 36.8 (05 Dec 2019 09:00), Max: 36.8 (04 Dec 2019 17:32)  T(F): 98.3 (05 Dec 2019 09:00), Max: 98.3 (04 Dec 2019 17:32)  HR: 62 (05 Dec 2019 09:00) (62 - 74)  BP: 122/73 (05 Dec 2019 09:00) (122/73 - 148/82)  BP(mean): --  RR: 20 (05 Dec 2019 09:00) (16 - 20)  SpO2: 94% (05 Dec 2019 09:00) (94% - 100%)    GENERAL: Elderly male looking comfortable   CHEST/LUNG: Decrease air entry bilaterally; No wheezing   HEART: S1S2+, Regular rate and rhythm; No murmurs  ABDOMEN: Soft, epigastric tenderness, Nondistended; Bowel sounds present  EXTREMITIES:  Peripheral Pulses, No edema                            11.6   5.97  )-----------( 192      ( 05 Dec 2019 07:54 )             37.3   12-05    145  |  109<H>  |  16.0  ----------------------------<  179<H>  4.4   |  24.0  |  0.98    Ca    10.4<H>      05 Dec 2019 07:54  Phos  1.8     12-04  Mg     2.2     12-04    TPro  7.1  /  Alb  3.3  /  TBili  0.9  /  DBili  x   /  AST  12  /  ALT  11  /  AlkPhos  53  12-04

## 2019-12-05 NOTE — PHYSICAL THERAPY INITIAL EVALUATION ADULT - ADDITIONAL COMMENTS
As per pt and pt's daughter Lisset, pt lives in a 3 story house with daughter and other family members (pt's daughter Lisset is the primary caregiver, however does work and pt's daughter stated that she hires private aides to help take care of him prn) with 3 DAMON no handrails + 10 steps 1  handrail to 2nd floor bed & bath. As per pt's daughter, pt's PLOF was independent in ambulation with RW and required supervision &/or assistance of another person for all ADL's. As per pt's daughter, pt has RW, SAC and transport WC DME in home. At this time, RW, tub transfer bench and bedside commode DME recommended at d/c. stair lift is also recommended to help assist with stair negotiation due to as per pt's daughter, no bathroom on ground level, which would make it difficult for pt to reside on ground level

## 2019-12-05 NOTE — PHYSICAL THERAPY INITIAL EVALUATION ADULT - PHYSICAL ASSIST/NONPHYSICAL ASSIST: STAND/SIT, REHAB EVAL
1 person assist/verbal cues re proper sequence + proper hand placement in prep to perform transfer/verbal cues

## 2019-12-05 NOTE — PROGRESS NOTE ADULT - SUBJECTIVE AND OBJECTIVE BOX
Patient is a 89y old  Male who presents with a chief complaint of intractable nausea and vomiting, gastric mass (04 Dec 2019 15:14)      HPI:  89yoM Creole speaking, hx HTN, DM, recently diagnosed with Afib and gastric mass at OSH presenting with nausea and vomiting.  Pt is poor historian despite use of Creole .  Collateral hx obtained via phone from daughter Lisset ([phone]).  Pt reports having generalized abdominal pain associated with nausea, non-bilious, non-bloody vomiting.  He is unable to specify how long he has been having these symptoms. Pt denies any fevers, chills or diarrhea. 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  Pt was started on Flagyl by [facility]. (03 Dec 2019 03:58)      REVIEW OF SYSTEMS:  Constitutional: No fever, weight loss or fatigue  ENMT:  No difficulty hearing, tinnitus, vertigo; No sinus or throat pain  Respiratory: No cough, wheezing, chills or hemoptysis  Cardiovascular: No chest pain, palpitations, dizziness or leg swelling  Gastrointestinal: No abdominal or epigastric pain. No nausea, vomiting or hematemesis; No diarrhea or constipation. No melena or hematochezia.  Skin: No itching, burning, rashes or lesions   Musculoskeletal: No joint pain or swelling; No muscle, back or extremity pain    PAST MEDICAL & SURGICAL HISTORY:  Neuropathy  Hypertension  A-fib  Diabetes  No significant past surgical history      FAMILY HISTORY:  No pertinent family history in first degree relatives      SOCIAL HISTORY:  Smoking Status: [ ] Current, [ ] Former, [ ] Never  Pack Years:  [  ] EtOH  [  ] IVDA    MEDICATIONS:  MEDICATIONS  (STANDING):  aspirin enteric coated 81 milliGRAM(s) Oral daily  dextrose 5%. 1000 milliLiter(s) (50 mL/Hr) IV Continuous <Continuous>  dextrose 50% Injectable 12.5 Gram(s) IV Push once  dextrose 50% Injectable 25 Gram(s) IV Push once  dextrose 50% Injectable 25 Gram(s) IV Push once  dorzolamide 2%/timolol 0.5% Ophthalmic Solution 1 Drop(s) Both EYES two times a day  gabapentin 100 milliGRAM(s) Oral daily  heparin  Injectable 5000 Unit(s) SubCutaneous every 8 hours  influenza   Vaccine 0.5 milliLiter(s) IntraMuscular once  insulin lispro (HumaLOG) corrective regimen sliding scale   SubCutaneous three times a day before meals  insulin lispro (HumaLOG) corrective regimen sliding scale   SubCutaneous at bedtime  lisinopril 20 milliGRAM(s) Oral daily  metoclopramide Injectable 10 milliGRAM(s) IV Push every 6 hours  simvastatin 40 milliGRAM(s) Oral at bedtime  sodium chloride 0.45%. 1000 milliLiter(s) (80 mL/Hr) IV Continuous <Continuous>    MEDICATIONS  (PRN):  dextrose 40% Gel 15 Gram(s) Oral once PRN Blood Glucose LESS THAN 70 milliGRAM(s)/deciliter  glucagon  Injectable 1 milliGRAM(s) IntraMuscular once PRN Glucose LESS THAN 70 milligrams/deciliter  ondansetron Injectable 4 milliGRAM(s) IV Push every 6 hours PRN Nausea and/or Vomiting      Allergies    No Known Allergies    Intolerances        Vital Signs Last 24 Hrs  T(C): 36.7 (04 Dec 2019 23:04), Max: 36.9 (04 Dec 2019 11:15)  T(F): 98.1 (04 Dec 2019 23:04), Max: 98.4 (04 Dec 2019 11:15)  HR: 62 (05 Dec 2019 07:35) (62 - 74)  BP: 122/73 (05 Dec 2019 07:35) (122/73 - 148/82)  BP(mean): --  RR: 16 (04 Dec 2019 23:04) (16 - 19)  SpO2: 95% (04 Dec 2019 23:04) (95% - 100%)        PHYSICAL EXAM:    General: Well developed; well nourished; in no acute distress  HEENT: MMM, conjunctiva and sclera clear  Gastrointestinal: Soft, non-tender non-distended; Normal bowel sounds; No rebound or guarding  Extremities: Normal range of motion, No clubbing, cyanosis or edema  Neurological: Alert and oriented x3  Skin: Warm and dry. No obvious rash      LABS:                        11.6   5.97  )-----------( 192      ( 05 Dec 2019 07:54 )             37.3     04 Dec 2019 07:30    149    |  116    |  29.0   ----------------------------<  310    4.3     |  22.0   |  1.14     Ca    10.6       04 Dec 2019 07:30  Phos  1.8       04 Dec 2019 07:30  Mg     2.2       04 Dec 2019 07:30    TPro  7.1    /  Alb  3.3    /  TBili  0.9    /  DBili  x      /  AST  12     /  ALT  11     /  AlkPhos  53     / Amylase x      /Lipase x      04 Dec 2019 07:30              RADIOLOGY & ADDITIONAL STUDIES: Patient is a 89y old  Male who presents with a chief complaint of intractable nausea and vomiting, gastric mass (04 Dec 2019 15:14)      HPI:  89yoM Creole speaking, hx HTN, DM, recently diagnosed with Afib and gastric mass at OSH presenting with nausea and vomiting.  Pt is poor historian . AS per nursing- no reports of vomiting on DASH diet      REVIEW OF SYSTEMS:  unable to obtain. Poor historian     PAST MEDICAL & SURGICAL HISTORY:  Neuropathy  Hypertension  A-fib  Diabetes  No significant past surgical history      FAMILY HISTORY:  No pertinent family history in first degree relatives      SOCIAL HISTORY:  Smoking Status: [ ] Current, [ ] Former, [ ] Never  Pack Years:  [  ] EtOH-no  [  ] IVDA    MEDICATIONS:  MEDICATIONS  (STANDING):  aspirin enteric coated 81 milliGRAM(s) Oral daily  dextrose 5%. 1000 milliLiter(s) (50 mL/Hr) IV Continuous <Continuous>  dextrose 50% Injectable 12.5 Gram(s) IV Push once  dextrose 50% Injectable 25 Gram(s) IV Push once  dextrose 50% Injectable 25 Gram(s) IV Push once  dorzolamide 2%/timolol 0.5% Ophthalmic Solution 1 Drop(s) Both EYES two times a day  gabapentin 100 milliGRAM(s) Oral daily  heparin  Injectable 5000 Unit(s) SubCutaneous every 8 hours  influenza   Vaccine 0.5 milliLiter(s) IntraMuscular once  insulin lispro (HumaLOG) corrective regimen sliding scale   SubCutaneous three times a day before meals  insulin lispro (HumaLOG) corrective regimen sliding scale   SubCutaneous at bedtime  lisinopril 20 milliGRAM(s) Oral daily  metoclopramide Injectable 10 milliGRAM(s) IV Push every 6 hours  simvastatin 40 milliGRAM(s) Oral at bedtime  sodium chloride 0.45%. 1000 milliLiter(s) (80 mL/Hr) IV Continuous <Continuous>    MEDICATIONS  (PRN):  dextrose 40% Gel 15 Gram(s) Oral once PRN Blood Glucose LESS THAN 70 milliGRAM(s)/deciliter  glucagon  Injectable 1 milliGRAM(s) IntraMuscular once PRN Glucose LESS THAN 70 milligrams/deciliter  ondansetron Injectable 4 milliGRAM(s) IV Push every 6 hours PRN Nausea and/or Vomiting      Allergies    No Known Allergies    Intolerances        Vital Signs Last 24 Hrs  T(C): 36.7 (04 Dec 2019 23:04), Max: 36.9 (04 Dec 2019 11:15)  T(F): 98.1 (04 Dec 2019 23:04), Max: 98.4 (04 Dec 2019 11:15)  HR: 62 (05 Dec 2019 07:35) (62 - 74)  BP: 122/73 (05 Dec 2019 07:35) (122/73 - 148/82)  BP(mean): --  RR: 16 (04 Dec 2019 23:04) (16 - 19)  SpO2: 95% (04 Dec 2019 23:04) (95% - 100%)        PHYSICAL EXAM:    General: elderly , frail   HEENT: MMM, conjunctiva and sclera clear  H- RRR  L- CTA  Gastrointestinal: Soft, non-tender non-distended; Normal bowel sounds; No rebound or guarding  Extremities: Normal range of motion, No clubbing, cyanosis or edema  Neurological: Alert   Skin: Warm and dry. No obvious rash      LABS:                        11.6   5.97  )-----------( 192      ( 05 Dec 2019 07:54 )             37.3     04 Dec 2019 07:30    149    |  116    |  29.0   ----------------------------<  310    4.3     |  22.0   |  1.14     Ca    10.6       04 Dec 2019 07:30  Phos  1.8       04 Dec 2019 07:30  Mg     2.2       04 Dec 2019 07:30    TPro  7.1    /  Alb  3.3    /  TBili  0.9    /  DBili  x      /  AST  12     /  ALT  11     /  AlkPhos  53     / Amylase x      /Lipase x      04 Dec 2019 07:30              RADIOLOGY & ADDITIONAL STUDIES:     < from: CT Abdomen and Pelvis No Cont (12.02.19 @ 21:56) >  IMPRESSION:    Uncomplicated cholelithiasis.    Collapsed stomach with incompletely characterized exophytic lesion   measuring 4 x 3.6 cm in the fundal portion. 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 GI consultation and further workup as   indicated. No evidence of small bowel obstruction or extraluminal   collection.     Incompletely characterized sub-cm left adrenal nodule.    Heterogeneously prominent prostate with nodular density near the apex and   bladder protrusion. Mild bladder wall thickening with moderate   distention. This may related to chronic bladder obstruction from enlarged   prostate. Correlate with urinalysis to assess for infection. Consider   direct visualization if there is concern for bladder malignancy.    Too small to characterize bilateral subpleural based nodules for which   nonemergent short-term pulmonary imaging follow-up is advised.                CHADWICK GOINS M.D., ATTENDING RADIOLOGIST  This document has been electronically signed. Dec  2 2019 10:29PM          < end of copied text >

## 2019-12-05 NOTE — PROGRESS NOTE ADULT - ASSESSMENT
· Assessment		  89yoM Creole speaking, hx HTN, DM, recently diagnosed with Afib and gastric mass at OSH presenting with nausea and vomiting in setting of gastric mass    1-Intractable nausea and vomiting in setting of gastric mass:   -CT abd/pelvis showing exophytic mass concerning for neoplasm  Continue antiemetic-Reglan IV and Zofran prn   GI follow up appreciated   -Met with daughter on Friday. She herself is an RN and knows he most likely has gastric cancer. She was hoping they could find  and treat the reason for his persistent N/V inability to eat or drink   He is not candidate for surgical intervention/chemo/radiation,     2- Abdominal epigastric pain  Oxycodone 2.5 mg Q3 prn moderate 5mg PO for severe pain  Ofirmev IV ordered  Would recommend Fentanyl 12 mcg/hr patch be initiated    3-VINCE:   -Likely pre-renal from GI losses, resolved   Monitor BUN/CR    4-Hypercalcemia:   May be related to cancer  -Repeat CA noted  check PTH,  Calcium level is coming down, Holding HCTZ    5--Paroxysmal Afib  -Per daughter, had episode of Afib at OSH,  Not maintained on Anticoagulant risks don't outweigh benefit  Rate is controlled today    Goals of Care  MOLST reviewed and completed in ED upon admission  Code Status: DNR/DNI   Daughter in process of engaging medicaid in providing HHA for her Dad at home  SW informed that Hospice should be called in after the Medicaid AIDS are already in place  Expedite home visit when discharged

## 2019-12-05 NOTE — PHYSICAL THERAPY INITIAL EVALUATION ADULT - PHYSICAL ASSIST/NONPHYSICAL ASSIST: SIT/STAND, REHAB EVAL
1 person assist/verbal cues/verbal cues re proper sequence + proper hand placement in prep to perform transfer

## 2019-12-05 NOTE — PHYSICAL THERAPY INITIAL EVALUATION ADULT - PHYSICAL ASSIST/NONPHYSICAL ASSIST: GAIT, REHAB EVAL
1 person assist/verbal cues re proper gait sequence and proper use of IV pole and physical assistance to help manuever IV pole and to help maintain upright standing and walking posture + required max verbal cues for instructions and to focus on task/verbal cues

## 2019-12-05 NOTE — PHYSICAL THERAPY INITIAL EVALUATION ADULT - DIAGNOSIS, PT EVAL
Decrease funcitonal status and mobility due to decrease strength, balance, transfers, endurance and ambulation + stair tolerance

## 2019-12-05 NOTE — PHYSICAL THERAPY INITIAL EVALUATION ADULT - LIVES WITH, PROFILE
other relative/as per pt, pt lives with family; as per pt's daughter, pt lives with her and other family members however she is the primary caregiver for patient

## 2019-12-05 NOTE — PHYSICAL THERAPY INITIAL EVALUATION ADULT - PLANNED THERAPY INTERVENTIONS, PT EVAL
gait training/postural re-education/strengthening/balance training/ROM/bed mobility training/transfer training

## 2019-12-05 NOTE — PHYSICAL THERAPY INITIAL EVALUATION ADULT - BED MOBILITY LIMITATIONS, REHAB EVAL
unable to perform due to pt was observed and returned sitting in chair with chair alarm, will require further assessment

## 2019-12-06 LAB
GLUCOSE BLDC GLUCOMTR-MCNC: 126 MG/DL — HIGH (ref 70–99)
GLUCOSE BLDC GLUCOMTR-MCNC: 242 MG/DL — HIGH (ref 70–99)
GLUCOSE BLDC GLUCOMTR-MCNC: 351 MG/DL — HIGH (ref 70–99)

## 2019-12-06 PROCEDURE — 99232 SBSQ HOSP IP/OBS MODERATE 35: CPT

## 2019-12-06 RX ORDER — INSULIN LISPRO 100/ML
5 VIAL (ML) SUBCUTANEOUS
Refills: 0 | Status: DISCONTINUED | OUTPATIENT
Start: 2019-12-06 | End: 2019-12-07

## 2019-12-06 RX ADMIN — Medication 5 UNIT(S): at 17:55

## 2019-12-06 RX ADMIN — Medication 650 MILLIGRAM(S): at 06:27

## 2019-12-06 RX ADMIN — HEPARIN SODIUM 5000 UNIT(S): 5000 INJECTION INTRAVENOUS; SUBCUTANEOUS at 05:27

## 2019-12-06 RX ADMIN — SODIUM CHLORIDE 80 MILLILITER(S): 9 INJECTION, SOLUTION INTRAVENOUS at 21:19

## 2019-12-06 RX ADMIN — Medication 4: at 17:55

## 2019-12-06 RX ADMIN — Medication 650 MILLIGRAM(S): at 21:18

## 2019-12-06 RX ADMIN — DORZOLAMIDE HYDROCHLORIDE TIMOLOL MALEATE 1 DROP(S): 20; 5 SOLUTION/ DROPS OPHTHALMIC at 05:26

## 2019-12-06 RX ADMIN — Medication 10 MILLIGRAM(S): at 21:18

## 2019-12-06 RX ADMIN — Medication 10 MILLIGRAM(S): at 17:56

## 2019-12-06 RX ADMIN — GABAPENTIN 100 MILLIGRAM(S): 400 CAPSULE ORAL at 12:24

## 2019-12-06 RX ADMIN — SIMVASTATIN 40 MILLIGRAM(S): 20 TABLET, FILM COATED ORAL at 21:19

## 2019-12-06 RX ADMIN — Medication 650 MILLIGRAM(S): at 13:28

## 2019-12-06 RX ADMIN — Medication 10 MILLIGRAM(S): at 05:27

## 2019-12-06 RX ADMIN — Medication 81 MILLIGRAM(S): at 12:24

## 2019-12-06 RX ADMIN — HEPARIN SODIUM 5000 UNIT(S): 5000 INJECTION INTRAVENOUS; SUBCUTANEOUS at 13:29

## 2019-12-06 RX ADMIN — DORZOLAMIDE HYDROCHLORIDE TIMOLOL MALEATE 1 DROP(S): 20; 5 SOLUTION/ DROPS OPHTHALMIC at 17:56

## 2019-12-06 RX ADMIN — Medication 5: at 12:23

## 2019-12-06 RX ADMIN — LISINOPRIL 20 MILLIGRAM(S): 2.5 TABLET ORAL at 05:27

## 2019-12-06 RX ADMIN — SODIUM CHLORIDE 80 MILLILITER(S): 9 INJECTION, SOLUTION INTRAVENOUS at 13:27

## 2019-12-06 RX ADMIN — HEPARIN SODIUM 5000 UNIT(S): 5000 INJECTION INTRAVENOUS; SUBCUTANEOUS at 21:18

## 2019-12-06 RX ADMIN — Medication 10 MILLIGRAM(S): at 12:24

## 2019-12-06 RX ADMIN — INSULIN GLARGINE 20 UNIT(S): 100 INJECTION, SOLUTION SUBCUTANEOUS at 21:17

## 2019-12-06 RX ADMIN — Medication 650 MILLIGRAM(S): at 22:18

## 2019-12-06 RX ADMIN — Medication 650 MILLIGRAM(S): at 14:15

## 2019-12-06 RX ADMIN — PANTOPRAZOLE SODIUM 40 MILLIGRAM(S): 20 TABLET, DELAYED RELEASE ORAL at 12:25

## 2019-12-06 RX ADMIN — Medication 650 MILLIGRAM(S): at 05:27

## 2019-12-06 NOTE — PROGRESS NOTE ADULT - ASSESSMENT
89yoM Creole speaking, hx HTN, DM, recently diagnosed with Afib and gastric mass at OSH presenting with nausea and vomiting in setting of gastric mass    1-Intractable nausea and vomiting in setting of gastric mass:   -CT abd/pelvis showing exophytic mass concerning for neoplasm  per family no further work up treatment   GI sign off , pt is tolerating diet   discharge home in am per family and CCC       2-VINCE: resolved   -Likely pre-renal from GI losses, resolved         3-Hypercalcemia:   Calcium level is coming down, Holding HCTZ  cont iv fluid   repeat lytes in am     4-HTN  on lisinopril controlled       5-DM- controlled   -Holding sulfonylurea   -Insulin sliding scale   controlled     6-Paroxysmal Afib:   -Per daughter, had episode of Afib at OSH, not placed on any AC, presumably due to advanced age      Code Status: DNR/DNI  comfort measures

## 2019-12-06 NOTE — DIETITIAN INITIAL EVALUATION ADULT. - OTHER INFO
89yoM Creole speaking, hx HTN, DM, recently diagnosed with Afib and gastric mass at OSH presenting with nausea and vomiting in setting of gastric mass. Pt poor historian. Nursing reported pt consumed 100% of meals today with no N/V. Pt appetite/po intake greatly improving per EMR and nursing. Pt N/V improving per EMR and nursing.

## 2019-12-06 NOTE — PROGRESS NOTE ADULT - SUBJECTIVE AND OBJECTIVE BOX
JOHNATHON SEVERINO    [id number]    89y      Male    Patient is a 89y old  Male who presents with a chief complaint of intractable nausea and vomiting, gastric mass    seen earlier today , he is seen with Creole speaking nurse aid , Emilia   pt is resting in the bed , no distress tolerating diet no vomiting reported per nurse     Vital Signs Last 24 Hrs  T(C): 36.8 (06 Dec 2019 09:42), Max: 36.8 (06 Dec 2019 09:42)  T(F): 98.3 (06 Dec 2019 09:42), Max: 98.3 (06 Dec 2019 09:42)  HR: 85 (06 Dec 2019 09:42) (73 - 91)  BP: 123/47 (06 Dec 2019 09:42) (123/47 - 140/76)  BP(mean): --  RR: 18 (06 Dec 2019 09:42) (18 - 18)  SpO2: 94% (06 Dec 2019 09:42) (93% - 96%)    GENERAL: Elderly male looking comfortable   CHEST/LUNG: Decrease air entry bilaterally; No wheezing   HEART: S1S2+, Regular rate and rhythm; No murmurs  ABDOMEN: Soft, epigastric tenderness, Nondistended; Bowel sounds present  EXTREMITIES:  Peripheral Pulses, No edema                                     11.6   5.97  )-----------( 192      ( 05 Dec 2019 07:54 )             37.3   12-05    145  |  109<H>  |  16.0  ----------------------------<  179<H>  4.4   |  24.0  |  0.98    Ca    10.4<H>      05 Dec 2019 07:54

## 2019-12-06 NOTE — PROGRESS NOTE ADULT - SUBJECTIVE AND OBJECTIVE BOX
INTERVAL HPI/OVERNIGHT EVENTS: 89yMalePatient is a     89y old  Male who presents with a chief complaint of intractable nausea and vomiting, gastric mass (06 Dec 2019 14:57)      Present Symptoms:     Dyspnea: 0 1 2 3   Nausea/Vomiting: Yes No  Anxiety:  Yes No  Depression: Yes No  Fatigue: Yes No  Loss of appetite: Yes No    Pain:             Character-            Duration-            Effect-            Factors-            Frequency-            Location-            Severity-    Review of Systems: Reviewed                     Negative:                     Positive:  Unable to obtain due to poor mentation   All others negative    MEDICATIONS  (STANDING):  acetaminophen    Suspension .. 650 milliGRAM(s) Oral every 8 hours  aspirin enteric coated 81 milliGRAM(s) Oral daily  dextrose 5%. 1000 milliLiter(s) (50 mL/Hr) IV Continuous <Continuous>  dextrose 50% Injectable 12.5 Gram(s) IV Push once  dextrose 50% Injectable 25 Gram(s) IV Push once  dextrose 50% Injectable 25 Gram(s) IV Push once  dorzolamide 2%/timolol 0.5% Ophthalmic Solution 1 Drop(s) Both EYES two times a day  gabapentin 100 milliGRAM(s) Oral daily  heparin  Injectable 5000 Unit(s) SubCutaneous every 8 hours  influenza   Vaccine 0.5 milliLiter(s) IntraMuscular once  insulin glargine Injectable (LANTUS) 20 Unit(s) SubCutaneous at bedtime  insulin lispro (HumaLOG) corrective regimen sliding scale   SubCutaneous three times a day before meals  insulin lispro (HumaLOG) corrective regimen sliding scale   SubCutaneous at bedtime  lisinopril 20 milliGRAM(s) Oral daily  metoclopramide Injectable 10 milliGRAM(s) IV Push every 6 hours  pantoprazole  Injectable 40 milliGRAM(s) IV Push daily  simvastatin 40 milliGRAM(s) Oral at bedtime  sodium chloride 0.45%. 1000 milliLiter(s) (80 mL/Hr) IV Continuous <Continuous>    MEDICATIONS  (PRN):  acetaminophen   Tablet .. 650 milliGRAM(s) Oral every 6 hours PRN Moderate Pain (4 - 6)  dextrose 40% Gel 15 Gram(s) Oral once PRN Blood Glucose LESS THAN 70 milliGRAM(s)/deciliter  glucagon  Injectable 1 milliGRAM(s) IntraMuscular once PRN Glucose LESS THAN 70 milligrams/deciliter  ondansetron Injectable 4 milliGRAM(s) IV Push every 6 hours PRN Nausea and/or Vomiting  oxyCODONE    IR 2.5 milliGRAM(s) Oral every 3 hours PRN abdominal pain  oxyCODONE    IR 5 milliGRAM(s) Oral every 3 hours PRN Severe Pain (7 - 10)      PHYSICAL EXAM:    Vital Signs Last 24 Hrs  T(C): 36.8 (06 Dec 2019 09:42), Max: 36.8 (06 Dec 2019 09:42)  T(F): 98.3 (06 Dec 2019 09:42), Max: 98.3 (06 Dec 2019 09:42)  HR: 85 (06 Dec 2019 09:42) (73 - 91)  BP: 123/47 (06 Dec 2019 09:42) (123/47 - 140/76)  BP(mean): --  RR: 18 (06 Dec 2019 09:42) (18 - 18)  SpO2: 94% (06 Dec 2019 09:42) (93% - 96%)    General: alert  oriented x ____ lethargic agitated                  cachexia  nonverbal  coma    Karnofsky:  %    HEENT: normal  dry mouth  ET tube/trach    Lungs: comfortable tachypnea/labored breathing  excessive secretions    CV: normal  tachycardia    GI: normal  distended  tender  no BS               PEG/NG/OG tube  constipation  last BM:     : normal  incontinent  oliguria/anuria  conley    MSK: normal  weakness  edema             ambulatory  bedbound/wheelchair bound    Skin: normal  pressure ulcers- Stage_____  no rash    LABS:                        11.6   5.97  )-----------( 192      ( 05 Dec 2019 07:54 )             37.3     12-05    145  |  109<H>  |  16.0  ----------------------------<  179<H>  4.4   |  24.0  |  0.98    Ca    10.4<H>      05 Dec 2019 07:54          I&O's Summary    05 Dec 2019 07:01  -  06 Dec 2019 07:00  --------------------------------------------------------  IN: 1040 mL / OUT: 0 mL / NET: 1040 mL    06 Dec 2019 07:01  -  06 Dec 2019 15:10  --------------------------------------------------------  IN: 480 mL / OUT: 0 mL / NET: 480 mL        RADIOLOGY & ADDITIONAL STUDIES:    ADVANCE DIRECTIVES:   DNR YES NO  Completed on:                     MOLST  YES NO   Completed on:  Living Will  YES NO   Completed on: INTERVAL HPI/OVERNIGHT EVENTS:   This is a F/U Note  -uneventful night,  Lying in bed after lunch-ate well.  He is in a good mood, smiling calm and comfortable at time of my visit,denies N/V/D abdominal pain,  dysuria constipation     89y old  Male who presents with a chief complaint of intractable nausea and vomiting, gastric mass (06 Dec 2019 14:57)      Present Symptoms:     Dyspnea: 0   Nausea/Vomiting:  No  Anxiety:  No  Depression: Yes   Fatigue: Yes   Loss of appetite:  No    Pain: deneis at present            Character-            Duration-            Effect-            Factors-            Frequency-            Location-            Severity-    Review of Systems: Reviewed                 All others negative    MEDICATIONS  (STANDING):  acetaminophen    Suspension .. 650 milliGRAM(s) Oral every 8 hours  aspirin enteric coated 81 milliGRAM(s) Oral daily  dextrose 5%. 1000 milliLiter(s) (50 mL/Hr) IV Continuous <Continuous>  dextrose 50% Injectable 12.5 Gram(s) IV Push once  dextrose 50% Injectable 25 Gram(s) IV Push once  dextrose 50% Injectable 25 Gram(s) IV Push once  dorzolamide 2%/timolol 0.5% Ophthalmic Solution 1 Drop(s) Both EYES two times a day  gabapentin 100 milliGRAM(s) Oral daily  heparin  Injectable 5000 Unit(s) SubCutaneous every 8 hours  influenza   Vaccine 0.5 milliLiter(s) IntraMuscular once  insulin glargine Injectable (LANTUS) 20 Unit(s) SubCutaneous at bedtime  insulin lispro (HumaLOG) corrective regimen sliding scale   SubCutaneous three times a day before meals  insulin lispro (HumaLOG) corrective regimen sliding scale   SubCutaneous at bedtime  lisinopril 20 milliGRAM(s) Oral daily  metoclopramide Injectable 10 milliGRAM(s) IV Push every 6 hours  pantoprazole  Injectable 40 milliGRAM(s) IV Push daily  simvastatin 40 milliGRAM(s) Oral at bedtime  sodium chloride 0.45%. 1000 milliLiter(s) (80 mL/Hr) IV Continuous <Continuous>    MEDICATIONS  (PRN):  acetaminophen   Tablet .. 650 milliGRAM(s) Oral every 6 hours PRN Moderate Pain (4 - 6)  dextrose 40% Gel 15 Gram(s) Oral once PRN Blood Glucose LESS THAN 70 milliGRAM(s)/deciliter  glucagon  Injectable 1 milliGRAM(s) IntraMuscular once PRN Glucose LESS THAN 70 milligrams/deciliter  ondansetron Injectable 4 milliGRAM(s) IV Push every 6 hours PRN Nausea and/or Vomiting  oxyCODONE    IR 2.5 milliGRAM(s) Oral every 3 hours PRN abdominal pain  oxyCODONE    IR 5 milliGRAM(s) Oral every 3 hours PRN Severe Pain (7 - 10)      PHYSICAL EXAM:    Vital Signs Last 24 Hrs  T(C): 36.8 (06 Dec 2019 09:42), Max: 36.8 (06 Dec 2019 09:42)  T(F): 98.3 (06 Dec 2019 09:42), Max: 98.3 (06 Dec 2019 09:42)  HR: 85 (06 Dec 2019 09:42) (73 - 91)  BP: 123/47 (06 Dec 2019 09:42) (123/47 - 140/76)  BP(mean): --  RR: 18 (06 Dec 2019 09:42) (18 - 18)  SpO2: 94% (06 Dec 2019 09:42) (93% - 96%)    General: alert  oriented x self__   thin  HEENT: normal      Lungs: comfortable     CV: mara    GI: normal    constipation  last BM:     : normal  incontinent    MSK:  weakness               bedbound/wheelchair bound    Skin: normal dry___  no rash    LABS:                        11.6   5.97  )-----------( 192      ( 05 Dec 2019 07:54 )             37.3     12-05    145  |  109<H>  |  16.0  ----------------------------<  179<H>  4.4   |  24.0  |  0.98    Ca    10.4<H>      05 Dec 2019 07:54    I&O's Summary    05 Dec 2019 07:01  -  06 Dec 2019 07:00  --------------------------------------------------------  IN: 1040 mL / OUT: 0 mL / NET: 1040 mL    06 Dec 2019 07:01  -  06 Dec 2019 15:10  --------------------------------------------------------  IN: 480 mL / OUT: 0 mL / NET: 480 mL    RADIOLOGY & ADDITIONAL STUDIES:    ADVANCE DIRECTIVES:   DNR YES  Completed on:  Day of admission in ED 12/2019                    AYALA  YES   Completed on:  Living Will   NO   Completed on:

## 2019-12-06 NOTE — PROGRESS NOTE ADULT - ATTENDING COMMENTS
COUNSELING:    Face to face meeting to discuss Advanced Care Planning - Time Spent ______ Minutes.  See goals of care note.    More than 50% time spent in counseling and coordinating care. __20____ Minutes.     Thank you for the opportunity to assist with the care of this patient.   [facility] [phone].

## 2019-12-06 NOTE — CHART NOTE - NSCHARTNOTEFT_GEN_A_CORE
Upon Nutritional Assessment by the Registered Dietitian your patient was determined to meet criteria / has evidence of the following diagnosis/diagnoses:          [x ] Severe Protein Calorie Malnutrition        Findings as based on:  •  Comprehensive nutrition assessment and consultation  •  Calorie counts (nutrient intake analysis)  •  Food acceptance and intake status from observations by staff  •  Follow up  •  Patient education  •  Intervention secondary to interdisciplinary rounds  •   concerns      Treatment:    The following diet has been recommended:      PROVIDER Section:     By signing this assessment you are acknowledging and agree with the diagnosis/diagnoses assigned by the Registered Dietitian    Comments:    RX: Glucerna TID, Encourage po intake, Monitor weights and labs

## 2019-12-06 NOTE — DIETITIAN INITIAL EVALUATION ADULT. - MALNUTRITION
NFPE performed. Severe muscle wasting at temples, clavicle, shoulder. Severe fat loss in buccal pad, orbital region, triceps. Severe (acute)

## 2019-12-06 NOTE — DIETITIAN INITIAL EVALUATION ADULT. - ETIOLOGY
related to inability to meet sufficient protein-energy in setting of lack of po intake secondary to prior vomiting and nausea

## 2019-12-06 NOTE — PROGRESS NOTE ADULT - ASSESSMENT
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  no surgical or chemotherapeutic treatment can be offered    2-Intractable nausea and vomiting in setting of gastric mass:   -CT abd/pelvis showing exophytic mass concerning for neoplasm  per family no further work up treatment   GI sign off , pt is tolerating diet         3-Hypercalcemia:   Calcium level is coming down, Holding HCTZ  cont iv fluid   repeat lytes in am       4- Protein Calorie Malnourished  Able to drink nutritional supplement  Part of discharge plan      PLAN--discharge home in am per family and CCC   Collaborated with CCC to plan Home care support upon discharge; holding off on Hospice consult and support at  home until [facility] in place    Code Status: MOLST in PLace   DNR/DNI  comfort measures 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  no surgical or chemotherapeutic treatment can be offered    2-Intractable nausea and vomiting in setting of gastric mass:   -CT abd/pelvis showing exophytic mass concerning for neoplasm  per family no further work up treatment   GI sign off , pt is tolerating diet    3-Protein Calorie Malnutrtion  Able to drink nutritional supplement  Part of discharge plan      GOC PLAN--discharge home in am per family and CCC   Collaborated with CCC to plan Home care support upon discharge; holding off on Hospice consult and support at  home until [facility] in place    Code Status: MOLST in PLace   DNR/DNI  comfort measures

## 2019-12-07 ENCOUNTER — TRANSCRIPTION ENCOUNTER (OUTPATIENT)
Age: 84
End: 2019-12-07

## 2019-12-07 VITALS
HEART RATE: 61 BPM | SYSTOLIC BLOOD PRESSURE: 136 MMHG | DIASTOLIC BLOOD PRESSURE: 68 MMHG | TEMPERATURE: 98 F | OXYGEN SATURATION: 98 %

## 2019-12-07 LAB
GLUCOSE BLDC GLUCOMTR-MCNC: 65 MG/DL — LOW (ref 70–99)
GLUCOSE BLDC GLUCOMTR-MCNC: 77 MG/DL — SIGNIFICANT CHANGE UP (ref 70–99)

## 2019-12-07 PROCEDURE — 94640 AIRWAY INHALATION TREATMENT: CPT

## 2019-12-07 PROCEDURE — 80053 COMPREHEN METABOLIC PANEL: CPT

## 2019-12-07 PROCEDURE — 74176 CT ABD & PELVIS W/O CONTRAST: CPT

## 2019-12-07 PROCEDURE — 99285 EMERGENCY DEPT VISIT HI MDM: CPT | Mod: 25

## 2019-12-07 PROCEDURE — 93005 ELECTROCARDIOGRAM TRACING: CPT

## 2019-12-07 PROCEDURE — 80048 BASIC METABOLIC PNL TOTAL CA: CPT

## 2019-12-07 PROCEDURE — 84100 ASSAY OF PHOSPHORUS: CPT

## 2019-12-07 PROCEDURE — 71045 X-RAY EXAM CHEST 1 VIEW: CPT

## 2019-12-07 PROCEDURE — 96360 HYDRATION IV INFUSION INIT: CPT

## 2019-12-07 PROCEDURE — 83519 RIA NONANTIBODY: CPT

## 2019-12-07 PROCEDURE — 36415 COLL VENOUS BLD VENIPUNCTURE: CPT

## 2019-12-07 PROCEDURE — 82962 GLUCOSE BLOOD TEST: CPT

## 2019-12-07 PROCEDURE — 83970 ASSAY OF PARATHORMONE: CPT

## 2019-12-07 PROCEDURE — 92610 EVALUATE SWALLOWING FUNCTION: CPT

## 2019-12-07 PROCEDURE — 99239 HOSP IP/OBS DSCHRG MGMT >30: CPT

## 2019-12-07 PROCEDURE — 82310 ASSAY OF CALCIUM: CPT

## 2019-12-07 PROCEDURE — 85027 COMPLETE CBC AUTOMATED: CPT

## 2019-12-07 PROCEDURE — 81001 URINALYSIS AUTO W/SCOPE: CPT

## 2019-12-07 PROCEDURE — 83690 ASSAY OF LIPASE: CPT

## 2019-12-07 PROCEDURE — 97163 PT EVAL HIGH COMPLEX 45 MIN: CPT

## 2019-12-07 PROCEDURE — 83735 ASSAY OF MAGNESIUM: CPT

## 2019-12-07 PROCEDURE — 83036 HEMOGLOBIN GLYCOSYLATED A1C: CPT

## 2019-12-07 RX ORDER — PANTOPRAZOLE SODIUM 20 MG/1
1 TABLET, DELAYED RELEASE ORAL
Qty: 30 | Refills: 0
Start: 2019-12-07 | End: 2020-01-05

## 2019-12-07 RX ORDER — ACETAMINOPHEN 500 MG
2 TABLET ORAL
Qty: 0 | Refills: 0 | DISCHARGE
Start: 2019-12-07

## 2019-12-07 RX ORDER — ONDANSETRON 8 MG/1
0 TABLET, FILM COATED ORAL
Qty: 0 | Refills: 0 | DISCHARGE

## 2019-12-07 RX ORDER — METOCLOPRAMIDE HCL 10 MG
5 TABLET ORAL
Qty: 150 | Refills: 0
Start: 2019-12-07 | End: 2019-12-16

## 2019-12-07 RX ORDER — METRONIDAZOLE 500 MG
1 TABLET ORAL
Qty: 0 | Refills: 0 | DISCHARGE

## 2019-12-07 RX ADMIN — Medication 10 MILLIGRAM(S): at 05:14

## 2019-12-07 RX ADMIN — Medication 650 MILLIGRAM(S): at 05:14

## 2019-12-07 RX ADMIN — HEPARIN SODIUM 5000 UNIT(S): 5000 INJECTION INTRAVENOUS; SUBCUTANEOUS at 05:14

## 2019-12-07 RX ADMIN — LISINOPRIL 20 MILLIGRAM(S): 2.5 TABLET ORAL at 05:15

## 2019-12-07 RX ADMIN — DORZOLAMIDE HYDROCHLORIDE TIMOLOL MALEATE 1 DROP(S): 20; 5 SOLUTION/ DROPS OPHTHALMIC at 05:15

## 2019-12-07 RX ADMIN — Medication 650 MILLIGRAM(S): at 06:14

## 2019-12-07 NOTE — PROGRESS NOTE ADULT - ASSESSMENT
89yoM Creole speaking, hx HTN, DM, recently diagnosed with Afib and gastric mass at OSH presenting with nausea and vomiting in setting of gastric mass    1-Intractable nausea and vomiting in setting of gastric mass:   -CT abd/pelvis showing exophytic mass concerning for neoplasm  per family no further work up treatment   GI signed off , pt is tolerating diet   discharge home today with family       2-VINCE: resolved   -Likely due to pre-renal from GI losses, resolved         3-Hypercalcemia:   improving stable      4-HTN  on lisinopril controlled       5-DM- controlled   -Holding sulfonylurea   -Insulin sliding scale       6-Paroxysmal Afib:   -Per daughter, had episode of Afib at OSH, not placed on any AC, presumably due to advanced age      Code Status: DNR/DNI  comfort measures

## 2019-12-07 NOTE — DISCHARGE NOTE PROVIDER - HOSPITAL COURSE
89yoM Creole speaking, hx HTN, DM, recently diagnosed with Afib and gastric mass at OSH presenting with nausea and vomiting in setting of gastric mass, he was seen by GI , family decided not to do any further testing invasive procedures , DNR /DNI . He is tolerating diet improved with protonix and antiemetic . Pt is is with diffuse body aches tylenol given     remains stable     discharged home with daughter in stable condition

## 2019-12-07 NOTE — PROGRESS NOTE ADULT - REASON FOR ADMISSION
intractable nausea and vomiting, gastric mass

## 2019-12-07 NOTE — DISCHARGE NOTE PROVIDER - NSDCCPCAREPLAN_GEN_ALL_CORE_FT
PRINCIPAL DISCHARGE DIAGNOSIS  Diagnosis: Intractable vomiting without nausea  Assessment and Plan of Treatment: improved , cont reglan and protonix      SECONDARY DISCHARGE DIAGNOSES  Diagnosis: Diabetes mellitus  Assessment and Plan of Treatment: cont insulin , hold pills if blood glucose runnig low    Diagnosis: Gastric neoplasm  Assessment and Plan of Treatment: caomfort care

## 2019-12-07 NOTE — PROGRESS NOTE ADULT - SUBJECTIVE AND OBJECTIVE BOX
JOHNATHON SEVERINO    [id number]    89y      Male    Patient is a 89y old  Male who presents with a chief complaint of intractable nausea and vomiting, gastric mass    seen with Creole speaking nurse aid on the floor, pt is confused at baseline , he is with c/o diffuse body aches   denies SOB   no overnight events reported     Vital Signs Last 24 Hrs  T(C): 36.6 (07 Dec 2019 08:15), Max: 36.9 (07 Dec 2019 05:04)  T(F): 97.8 (07 Dec 2019 08:15), Max: 98.4 (07 Dec 2019 05:04)  HR: 61 (07 Dec 2019 08:15) (61 - 89)  BP: 136/68 (07 Dec 2019 08:15) (136/68 - 138/92)  BP(mean): --  RR: 20 (06 Dec 2019 18:36) (20 - 20)  SpO2: 98% (07 Dec 2019 08:15) (97% - 98%)    GENERAL: Elderly male looking comfortable   CHEST/LUNG: Decrease air entry bilaterally; No wheezing   HEART: S1S2+, Regular rate and rhythm; No murmurs  ABDOMEN: Soft, no epigastric tenderness, Nondistended; Bowel sounds present  EXTREMITIES:  No edema

## 2019-12-07 NOTE — DISCHARGE NOTE PROVIDER - NSDCMRMEDTOKEN_GEN_ALL_CORE_FT
acetaminophen 325 mg oral tablet: 2 tab(s) orally every 6 hours, As needed, Moderate Pain (4 - 6)  aspirin 81 mg oral tablet: 1 tab(s) orally once a day  Basaglar KwikPen 100 units/mL subcutaneous solution:   dorzolamide-timolol 2%-0.5% preservative-free ophthalmic solution: 1 drop(s) to each affected eye 2 times a day  gabapentin 100 mg oral capsule:   glimepiride 2 mg oral tablet: 1 tab(s) orally once a day  lisinopril-hydrochlorothiazide 20 mg-12.5 mg oral tablet: 1 tab(s) orally once a day  metoclopramide 5 mg/5 mL oral syrup: 5 milliliter(s) orally 3 times a day (before meals)  as needed for nausea vomiting   pantoprazole 20 mg oral delayed release tablet: 1 tab(s) orally once a day  simvastatin 40 mg oral tablet: 1 tab(s) orally once a day (at bedtime)

## 2019-12-07 NOTE — DISCHARGE NOTE NURSING/CASE MANAGEMENT/SOCIAL WORK - PATIENT PORTAL LINK FT
You can access the FollowMyHealth Patient Portal offered by [facility] by registering at the following website: [email or web address]. By joining [facility]’s FollowMyHealth portal, you will also be able to view your health information using other applications (apps) compatible with our system.

## 2019-12-08 LAB — PTH RELATED PROT SERPL-MCNC: <2 PMOL/L — SIGNIFICANT CHANGE UP

## 2020-06-24 NOTE — DIETITIAN INITIAL EVALUATION ADULT. - NUTRITION DIAGNOSITC TERMINOLOGY #1
I informed Arnold that his replacement right earmold was in and ready to be swapped out. I offered options and Arnold decided to drop off the hearing aid.     -Josué Stout CCC-A     Malnutrition...

## 2022-05-25 ENCOUNTER — INPATIENT (INPATIENT)
Facility: HOSPITAL | Age: 87
LOS: 12 days | Discharge: HOME | DRG: 682 | End: 2022-06-07
Attending: FAMILY MEDICINE | Admitting: HOSPITALIST
Payer: MEDICAID

## 2022-05-25 VITALS
OXYGEN SATURATION: 98 % | HEIGHT: 68 IN | SYSTOLIC BLOOD PRESSURE: 168 MMHG | DIASTOLIC BLOOD PRESSURE: 99 MMHG | RESPIRATION RATE: 24 BRPM | TEMPERATURE: 98 F | HEART RATE: 86 BPM

## 2022-05-25 DIAGNOSIS — E87.5 HYPERKALEMIA: ICD-10-CM

## 2022-05-25 PROBLEM — G62.9 POLYNEUROPATHY, UNSPECIFIED: Chronic | Status: ACTIVE | Noted: 2019-12-02

## 2022-05-25 PROBLEM — I48.91 UNSPECIFIED ATRIAL FIBRILLATION: Chronic | Status: ACTIVE | Noted: 2019-12-02

## 2022-05-25 PROBLEM — E11.9 TYPE 2 DIABETES MELLITUS WITHOUT COMPLICATIONS: Chronic | Status: ACTIVE | Noted: 2019-12-02

## 2022-05-25 PROBLEM — Z00.00 ENCOUNTER FOR PREVENTIVE HEALTH EXAMINATION: Status: ACTIVE | Noted: 2022-05-25

## 2022-05-25 PROBLEM — I10 ESSENTIAL (PRIMARY) HYPERTENSION: Chronic | Status: ACTIVE | Noted: 2019-12-02

## 2022-05-25 LAB
ALBUMIN SERPL ELPH-MCNC: 3.2 G/DL — LOW (ref 3.3–5.2)
ALBUMIN SERPL ELPH-MCNC: 4 G/DL — SIGNIFICANT CHANGE UP (ref 3.3–5.2)
ALP SERPL-CCNC: 59 U/L — SIGNIFICANT CHANGE UP (ref 40–120)
ALP SERPL-CCNC: 75 U/L — SIGNIFICANT CHANGE UP (ref 40–120)
ALT FLD-CCNC: 12 U/L — SIGNIFICANT CHANGE UP
ALT FLD-CCNC: 16 U/L — SIGNIFICANT CHANGE UP
ANION GAP SERPL CALC-SCNC: 14 MMOL/L — SIGNIFICANT CHANGE UP (ref 5–17)
ANION GAP SERPL CALC-SCNC: 18 MMOL/L — HIGH (ref 5–17)
APPEARANCE UR: CLEAR — SIGNIFICANT CHANGE UP
APTT BLD: 29.5 SEC — SIGNIFICANT CHANGE UP (ref 27.5–35.5)
AST SERPL-CCNC: 11 U/L — SIGNIFICANT CHANGE UP
AST SERPL-CCNC: 9 U/L — SIGNIFICANT CHANGE UP
BACTERIA # UR AUTO: ABNORMAL
BASOPHILS # BLD AUTO: 0.01 K/UL — SIGNIFICANT CHANGE UP (ref 0–0.2)
BASOPHILS NFR BLD AUTO: 0.1 % — SIGNIFICANT CHANGE UP (ref 0–2)
BILIRUB SERPL-MCNC: 0.2 MG/DL — LOW (ref 0.4–2)
BILIRUB SERPL-MCNC: 0.2 MG/DL — LOW (ref 0.4–2)
BILIRUB UR-MCNC: NEGATIVE — SIGNIFICANT CHANGE UP
BUN SERPL-MCNC: 126.8 MG/DL — HIGH (ref 8–20)
BUN SERPL-MCNC: 133.5 MG/DL — HIGH (ref 8–20)
CALCIUM SERPL-MCNC: 10.3 MG/DL — HIGH (ref 8.6–10.2)
CALCIUM SERPL-MCNC: 10.7 MG/DL — HIGH (ref 8.6–10.2)
CHLORIDE SERPL-SCNC: 101 MMOL/L — SIGNIFICANT CHANGE UP (ref 98–107)
CHLORIDE SERPL-SCNC: 96 MMOL/L — LOW (ref 98–107)
CO2 SERPL-SCNC: 19 MMOL/L — LOW (ref 22–29)
CO2 SERPL-SCNC: 20 MMOL/L — LOW (ref 22–29)
COLOR SPEC: YELLOW — SIGNIFICANT CHANGE UP
CREAT SERPL-MCNC: 7.71 MG/DL — HIGH (ref 0.5–1.3)
CREAT SERPL-MCNC: 8.31 MG/DL — HIGH (ref 0.5–1.3)
DIFF PNL FLD: ABNORMAL
EGFR: 5 ML/MIN/1.73M2 — LOW
EGFR: 6 ML/MIN/1.73M2 — LOW
EOSINOPHIL # BLD AUTO: 0.04 K/UL — SIGNIFICANT CHANGE UP (ref 0–0.5)
EOSINOPHIL NFR BLD AUTO: 0.4 % — SIGNIFICANT CHANGE UP (ref 0–6)
EPI CELLS # UR: SIGNIFICANT CHANGE UP
FLUAV AG NPH QL: SIGNIFICANT CHANGE UP
FLUBV AG NPH QL: SIGNIFICANT CHANGE UP
GLUCOSE BLDC GLUCOMTR-MCNC: 184 MG/DL — HIGH (ref 70–99)
GLUCOSE SERPL-MCNC: 40 MG/DL — CRITICAL LOW (ref 70–99)
GLUCOSE SERPL-MCNC: 65 MG/DL — LOW (ref 70–99)
GLUCOSE UR QL: 50 MG/DL
HCT VFR BLD CALC: 34.9 % — LOW (ref 39–50)
HGB BLD-MCNC: 11.6 G/DL — LOW (ref 13–17)
IMM GRANULOCYTES NFR BLD AUTO: 0.5 % — SIGNIFICANT CHANGE UP (ref 0–1.5)
INR BLD: 1.08 RATIO — SIGNIFICANT CHANGE UP (ref 0.88–1.16)
KETONES UR-MCNC: NEGATIVE — SIGNIFICANT CHANGE UP
LEUKOCYTE ESTERASE UR-ACNC: ABNORMAL
LYMPHOCYTES # BLD AUTO: 1.19 K/UL — SIGNIFICANT CHANGE UP (ref 1–3.3)
LYMPHOCYTES # BLD AUTO: 11.9 % — LOW (ref 13–44)
MCHC RBC-ENTMCNC: 28 PG — SIGNIFICANT CHANGE UP (ref 27–34)
MCHC RBC-ENTMCNC: 33.2 GM/DL — SIGNIFICANT CHANGE UP (ref 32–36)
MCV RBC AUTO: 84.3 FL — SIGNIFICANT CHANGE UP (ref 80–100)
MONOCYTES # BLD AUTO: 0.94 K/UL — HIGH (ref 0–0.9)
MONOCYTES NFR BLD AUTO: 9.4 % — SIGNIFICANT CHANGE UP (ref 2–14)
NEUTROPHILS # BLD AUTO: 7.74 K/UL — HIGH (ref 1.8–7.4)
NEUTROPHILS NFR BLD AUTO: 77.7 % — HIGH (ref 43–77)
NITRITE UR-MCNC: NEGATIVE — SIGNIFICANT CHANGE UP
PH UR: 6 — SIGNIFICANT CHANGE UP (ref 5–8)
PLATELET # BLD AUTO: 313 K/UL — SIGNIFICANT CHANGE UP (ref 150–400)
POTASSIUM SERPL-MCNC: 6.2 MMOL/L — CRITICAL HIGH (ref 3.5–5.3)
POTASSIUM SERPL-MCNC: 7.8 MMOL/L — CRITICAL HIGH (ref 3.5–5.3)
POTASSIUM SERPL-SCNC: 6.2 MMOL/L — CRITICAL HIGH (ref 3.5–5.3)
POTASSIUM SERPL-SCNC: 7.8 MMOL/L — CRITICAL HIGH (ref 3.5–5.3)
PROT SERPL-MCNC: 6.7 G/DL — SIGNIFICANT CHANGE UP (ref 6.6–8.7)
PROT SERPL-MCNC: 8.1 G/DL — SIGNIFICANT CHANGE UP (ref 6.6–8.7)
PROT UR-MCNC: 15
PROTHROM AB SERPL-ACNC: 12.5 SEC — SIGNIFICANT CHANGE UP (ref 10.5–13.4)
RBC # BLD: 4.14 M/UL — LOW (ref 4.2–5.8)
RBC # FLD: 12.7 % — SIGNIFICANT CHANGE UP (ref 10.3–14.5)
RBC CASTS # UR COMP ASSIST: ABNORMAL /HPF (ref 0–4)
RSV RNA NPH QL NAA+NON-PROBE: SIGNIFICANT CHANGE UP
SARS-COV-2 RNA SPEC QL NAA+PROBE: SIGNIFICANT CHANGE UP
SODIUM SERPL-SCNC: 132 MMOL/L — LOW (ref 135–145)
SODIUM SERPL-SCNC: 135 MMOL/L — SIGNIFICANT CHANGE UP (ref 135–145)
SP GR SPEC: 1.01 — SIGNIFICANT CHANGE UP (ref 1.01–1.02)
UROBILINOGEN FLD QL: NEGATIVE MG/DL — SIGNIFICANT CHANGE UP
WBC # BLD: 9.97 K/UL — SIGNIFICANT CHANGE UP (ref 3.8–10.5)
WBC # FLD AUTO: 9.97 K/UL — SIGNIFICANT CHANGE UP (ref 3.8–10.5)
WBC UR QL: ABNORMAL /HPF (ref 0–5)

## 2022-05-25 PROCEDURE — 99285 EMERGENCY DEPT VISIT HI MDM: CPT

## 2022-05-25 PROCEDURE — 99223 1ST HOSP IP/OBS HIGH 75: CPT

## 2022-05-25 PROCEDURE — 71045 X-RAY EXAM CHEST 1 VIEW: CPT | Mod: 26

## 2022-05-25 RX ORDER — SIMVASTATIN 20 MG/1
40 TABLET, FILM COATED ORAL AT BEDTIME
Refills: 0 | Status: DISCONTINUED | OUTPATIENT
Start: 2022-05-25 | End: 2022-05-26

## 2022-05-25 RX ORDER — SODIUM CHLORIDE 9 MG/ML
1000 INJECTION, SOLUTION INTRAVENOUS
Refills: 0 | Status: DISCONTINUED | OUTPATIENT
Start: 2022-05-25 | End: 2022-05-25

## 2022-05-25 RX ORDER — FUROSEMIDE 40 MG
40 TABLET ORAL ONCE
Refills: 0 | Status: COMPLETED | OUTPATIENT
Start: 2022-05-25 | End: 2022-05-25

## 2022-05-25 RX ORDER — SODIUM CHLORIDE 9 MG/ML
1000 INJECTION INTRAMUSCULAR; INTRAVENOUS; SUBCUTANEOUS ONCE
Refills: 0 | Status: COMPLETED | OUTPATIENT
Start: 2022-05-25 | End: 2022-05-25

## 2022-05-25 RX ORDER — DEXTROSE 10 % IN WATER 10 %
250 INTRAVENOUS SOLUTION INTRAVENOUS ONCE
Refills: 0 | Status: COMPLETED | OUTPATIENT
Start: 2022-05-25 | End: 2022-05-25

## 2022-05-25 RX ORDER — AZITHROMYCIN 500 MG/1
500 TABLET, FILM COATED ORAL ONCE
Refills: 0 | Status: DISCONTINUED | OUTPATIENT
Start: 2022-05-25 | End: 2022-05-25

## 2022-05-25 RX ORDER — DEXTROSE 50 % IN WATER 50 %
50 SYRINGE (ML) INTRAVENOUS ONCE
Refills: 0 | Status: DISCONTINUED | OUTPATIENT
Start: 2022-05-25 | End: 2022-05-25

## 2022-05-25 RX ORDER — SODIUM ZIRCONIUM CYCLOSILICATE 10 G/10G
10 POWDER, FOR SUSPENSION ORAL ONCE
Refills: 0 | Status: DISCONTINUED | OUTPATIENT
Start: 2022-05-25 | End: 2022-05-26

## 2022-05-25 RX ORDER — ASPIRIN/CALCIUM CARB/MAGNESIUM 324 MG
81 TABLET ORAL DAILY
Refills: 0 | Status: ACTIVE | OUTPATIENT
Start: 2022-05-25 | End: 2023-04-23

## 2022-05-25 RX ORDER — SODIUM BICARBONATE 1 MEQ/ML
50 SYRINGE (ML) INTRAVENOUS ONCE
Refills: 0 | Status: COMPLETED | OUTPATIENT
Start: 2022-05-25 | End: 2022-05-25

## 2022-05-25 RX ORDER — PIPERACILLIN AND TAZOBACTAM 4; .5 G/20ML; G/20ML
3.38 INJECTION, POWDER, LYOPHILIZED, FOR SOLUTION INTRAVENOUS ONCE
Refills: 0 | Status: COMPLETED | OUTPATIENT
Start: 2022-05-25 | End: 2022-05-25

## 2022-05-25 RX ORDER — DORZOLAMIDE HYDROCHLORIDE TIMOLOL MALEATE 20; 5 MG/ML; MG/ML
1 SOLUTION/ DROPS OPHTHALMIC
Refills: 0 | Status: DISCONTINUED | OUTPATIENT
Start: 2022-05-25 | End: 2022-06-07

## 2022-05-25 RX ORDER — TAMSULOSIN HYDROCHLORIDE 0.4 MG/1
0.8 CAPSULE ORAL AT BEDTIME
Refills: 0 | Status: DISCONTINUED | OUTPATIENT
Start: 2022-05-25 | End: 2022-05-26

## 2022-05-25 RX ORDER — FUROSEMIDE 40 MG
20 TABLET ORAL ONCE
Refills: 0 | Status: COMPLETED | OUTPATIENT
Start: 2022-05-25 | End: 2022-05-25

## 2022-05-25 RX ORDER — GABAPENTIN 400 MG/1
300 CAPSULE ORAL DAILY
Refills: 0 | Status: DISCONTINUED | OUTPATIENT
Start: 2022-05-25 | End: 2022-05-26

## 2022-05-25 RX ORDER — ACETAMINOPHEN 500 MG
650 TABLET ORAL EVERY 6 HOURS
Refills: 0 | Status: DISCONTINUED | OUTPATIENT
Start: 2022-05-25 | End: 2022-05-26

## 2022-05-25 RX ORDER — SODIUM CHLORIDE 9 MG/ML
1000 INJECTION, SOLUTION INTRAVENOUS
Refills: 0 | Status: DISCONTINUED | OUTPATIENT
Start: 2022-05-25 | End: 2022-05-27

## 2022-05-25 RX ORDER — CALCIUM GLUCONATE 100 MG/ML
1 VIAL (ML) INTRAVENOUS ONCE
Refills: 0 | Status: COMPLETED | OUTPATIENT
Start: 2022-05-25 | End: 2022-05-25

## 2022-05-25 RX ORDER — CEFTRIAXONE 500 MG/1
1000 INJECTION, POWDER, FOR SOLUTION INTRAMUSCULAR; INTRAVENOUS ONCE
Refills: 0 | Status: DISCONTINUED | OUTPATIENT
Start: 2022-05-25 | End: 2022-05-25

## 2022-05-25 RX ORDER — DEXTROSE 50 % IN WATER 50 %
50 SYRINGE (ML) INTRAVENOUS ONCE
Refills: 0 | Status: COMPLETED | OUTPATIENT
Start: 2022-05-25 | End: 2022-05-25

## 2022-05-25 RX ADMIN — Medication 40 MILLIGRAM(S): at 17:37

## 2022-05-25 RX ADMIN — Medication 100 GRAM(S): at 13:40

## 2022-05-25 RX ADMIN — SODIUM CHLORIDE 125 MILLILITER(S): 9 INJECTION, SOLUTION INTRAVENOUS at 17:36

## 2022-05-25 RX ADMIN — SODIUM CHLORIDE 1000 MILLILITER(S): 9 INJECTION INTRAMUSCULAR; INTRAVENOUS; SUBCUTANEOUS at 11:03

## 2022-05-25 RX ADMIN — Medication 1 GRAM(S): at 14:10

## 2022-05-25 RX ADMIN — Medication 50 MILLIEQUIVALENT(S): at 15:31

## 2022-05-25 RX ADMIN — DORZOLAMIDE HYDROCHLORIDE TIMOLOL MALEATE 1 DROP(S): 20; 5 SOLUTION/ DROPS OPHTHALMIC at 23:20

## 2022-05-25 RX ADMIN — Medication 1500 MILLILITER(S): at 11:03

## 2022-05-25 RX ADMIN — PIPERACILLIN AND TAZOBACTAM 3.38 GRAM(S): 4; .5 INJECTION, POWDER, LYOPHILIZED, FOR SOLUTION INTRAVENOUS at 11:35

## 2022-05-25 RX ADMIN — Medication 250 MILLILITER(S): at 11:15

## 2022-05-25 RX ADMIN — SODIUM CHLORIDE 1000 MILLILITER(S): 9 INJECTION INTRAMUSCULAR; INTRAVENOUS; SUBCUTANEOUS at 15:00

## 2022-05-25 RX ADMIN — Medication 50 GRAM(S): at 15:59

## 2022-05-25 RX ADMIN — SODIUM CHLORIDE 125 MILLILITER(S): 9 INJECTION, SOLUTION INTRAVENOUS at 15:43

## 2022-05-25 RX ADMIN — SODIUM CHLORIDE 1000 MILLILITER(S): 9 INJECTION INTRAMUSCULAR; INTRAVENOUS; SUBCUTANEOUS at 15:20

## 2022-05-25 RX ADMIN — PIPERACILLIN AND TAZOBACTAM 200 GRAM(S): 4; .5 INJECTION, POWDER, LYOPHILIZED, FOR SOLUTION INTRAVENOUS at 11:03

## 2022-05-25 RX ADMIN — SODIUM CHLORIDE 1000 MILLILITER(S): 9 INJECTION INTRAMUSCULAR; INTRAVENOUS; SUBCUTANEOUS at 13:41

## 2022-05-25 RX ADMIN — SODIUM CHLORIDE 125 MILLILITER(S): 9 INJECTION, SOLUTION INTRAVENOUS at 16:56

## 2022-05-25 RX ADMIN — Medication 20 MILLIGRAM(S): at 13:40

## 2022-05-25 NOTE — ED ADULT TRIAGE NOTE - CHIEF COMPLAINT QUOTE
family reports patient's sugar was 35 this morning, given po with increase to 70. family reports altered mental status x3 days, DNR/DNI hospice. Dr. Rivera called to bedside. takes lasix at home with no urine output x a few days.  Dr. Kinney called to bedside for eval.

## 2022-05-25 NOTE — ED PROVIDER NOTE - CPE EDP NEURO NORM
Medical Necessity Clause: This procedure was medically necessary because the lesions that were treated were:
Post-Care Instructions: I reviewed with the patient in detail post-care instructions. Patient is to wear sunprotection, and avoid picking at any of the treated lesions. Pt may apply Vaseline to crusted or scabbing areas.
Treatment Number (Will Not Render If 0): 0
Render Post-Care Instructions In Note?: no
Detail Level: Detailed
Anesthesia Volume In Cc: 0.5
Medical Necessity Information: It is in your best interest to select a reason for this procedure from the list below. All of these items fulfill various CMS LCD requirements except the new and changing color options.
Consent: The patient's consent was obtained including but not limited to risks of crusting, scabbing, blistering, scarring, darker or lighter pigmentary change, recurrence, incomplete removal and infection.
normal...

## 2022-05-25 NOTE — H&P ADULT - NSHPPHYSICALEXAM_GEN_ALL_CORE
GENERAL: NAD,   HEAD:  Atraumatic, Normocephalic  EYES: EOMI, PERRL  NECK: Supple  NERVOUS SYSTEM:  Alert & Oriented X3, Motor Strength 5/5 B/L upper and lower extremities  CHEST/LUNG: Clear to percussion bilaterally; No rales, rhonchi, wheezing, or rubs  HEART: Regular rate and rhythm; No murmurs, rubs, or gallops  ABDOMEN: Soft, Nontender, Nondistended; Bowel sounds present  EXTREMITIES:  2+ Peripheral Pulses, No clubbing, cyanosis, or edema GENERAL: frail elderly- not really conversant but brief answers with creole speaking NA at bedside   HEAD:  Atraumatic, Normocephalic  NERVOUS SYSTEM: closes eyes in middle of speaking moves B/L upper and lower extremities  CHEST/LUNG:  bilaterally; No rales, rhonchi, wheezing, or rubs  HEART: Regular rate and rhythm; No murmurs, rubs, or gallops  ABDOMEN: Soft, Nontender, Bowel sounds present  EXTREMITIES:   No clubbing, cyanosis, or edema

## 2022-05-25 NOTE — ED PROVIDER NOTE - PROGRESS NOTE DETAILS
daughter states she wants patient to be admitted   to medicine and to be stabilized   and then she will take him home

## 2022-05-25 NOTE — ED ADULT NURSE NOTE - NURSING MUSC EXTREMITY LIMITED ROM
----- Message from [facility] sent at 2/28/2019  4:27 PM CST -----  Regarding: review  Hello,     Patient calling back to reschedule his colonoscopy from September. The review is from April on 2018. Can you please review again.     Thank you
Schedule Procedure:  Please schedule 5 years  Date of last procedure:    7/17/13, Dr. Dale Boo  Procedure:  Colonoscopy (62485) with Suprep  Sedation type:  Conscious sedation  Diagnosis:    History of colon polyps (Z86.010)  Is patient:  Diabetic? No  On Coumadin, Heparin, Lovenox? No  On aspirin/nonsteroidal anti-inflammatory drugs? Platelet Modifying (examples - Plavix, aspirin, nsaids, Aggrenox)? No  Prophylactic Antibiotics?  No  Location:  [address] [place] [address]
left upper extremity/right upper extremity/left lower extremity/right lower extremity

## 2022-05-25 NOTE — ED ADULT NURSE REASSESSMENT NOTE - NURSING NEURO ORIENTATION
disoriented to person/disoriented to place/disoriented to time/situation
disoriented to person/disoriented to place/disoriented to time/situation

## 2022-05-25 NOTE — ED PROVIDER NOTE - OBJECTIVE STATEMENT
89 y/o male not responsive at home , baseline pt is awake alert and oriented x three   h/o DM with FS 45   in ED awake alert and oriented speaking to daughter in creole   no fever , no n/v/d

## 2022-05-25 NOTE — CONSULT NOTE ADULT - ASSESSMENT
A) DM with ARF,  ? also CRF, High k, Metabolic acidosis. Pt has been on glimepiride and lisinopril. Bladder outlet issues chronic ?    P) IV  fluid of dextrose and  bicarb., CT, follow up labs. [facility].

## 2022-05-25 NOTE — ED ADULT NURSE REASSESSMENT NOTE - NS ED NURSE REASSESS COMMENT FT1
Right upper arm IV infiltrated.  Unable to flush.  MD Kinney aware. U/S IV team contacted. Awaiting IV access to continue IV fluids and ABX
pt A&OX0, resp even and unlabored no distress noted, cardiac monitor in place showing ST, gave report to [facility] RN VI and pt moved to CDU 6L
pt arouses to verbal stimuli, pt A&Ox0, resp even and unlabored no distress noted, bed in lowest position and side rails up

## 2022-05-25 NOTE — ED ADULT NURSE NOTE - NSIMPLEMENTINTERV_GEN_ALL_ED
Implemented All Fall with Harm Risk Interventions:  [place] to call system. Call bell, personal items and telephone within reach. Instruct patient to call for assistance. Room bathroom lighting operational. Non-slip footwear when patient is off stretcher. Physically safe environment: no spills, clutter or unnecessary equipment. Stretcher in lowest position, wheels locked, appropriate side rails in place. Provide visual cue, wrist band, yellow gown, etc. Monitor gait and stability. Monitor for mental status changes and reorient to person, place, and time. Review medications for side effects contributing to fall risk. Reinforce activity limits and safety measures with patient and family. Provide visual clues: red socks.

## 2022-05-25 NOTE — ED ADULT NURSE NOTE - OBJECTIVE STATEMENT
PT presents to ED from home for hypoglycemia.  PT DNR/DNI per pts daughter  PT on hospice. Pt resides with daughter. Pt was found with glucose of 30 per EMS.  PT given some dextrose per EMS.  PT glucose 71 here pt with sepsis work up.  IV placed labs drawn. PT found with severely distended bladder. Per pts daughter, pt has not had urine output in 3 days.  [facility] placed with return of urine.  PT hypothermic placed on Remberto hugger.  Pending Dispo

## 2022-05-25 NOTE — H&P ADULT - HISTORY OF PRESENT ILLNESS
89 year old creole speaking male with medical hx significant for IDDM , hypertension, hyperlipidemia, GERD and ARF -is BIBF for lethargy and confusion for 3 days  and in Ed found to have urinary retention- it was missed by family as he is in a diaper and the diapers were getting wet, In ED is found to be in ARF and 2 liter urine drained with the [facility] catheter.  In ED patient is AAO times 3 in ED but was lethargic and some what confused for past 3 days   As per family reports patient's sugar was 35 this morning, given po with increase to 70.    89 year old creole speaking male with medical hx significant for IDDM , hypertension, hyperlipidemia, GERD and ARF -is BIBF for lethargy and confusion for 3 days  and in Ed found to have urinary retention- it was missed by family as he is in a diaper and the diapers were getting wet, In ED is found to be in ARF and 2 liter urine drained with the [facility] catheter.  In ED patient is AAO (with creole ) in ED but was lethargic and ? has been confused for past 3 days at home and inconsistent at answering questions even with creole speaking NA at bedside.  As per family reports patient's sugar was 35 this morning, given po with increase to 70.

## 2022-05-25 NOTE — H&P ADULT - ASSESSMENT
89 year old creole speaking male with medical hx significant for IDDM , hypertension, hyperlipidemia, GERD and ARF -is BIBF for lethargy and confusion for 3 days and in ED found to have urinary retention  ARF-post renal as there was 2 liters with Kaiser insertion  unknown baseline - with advanced age-   possible prostate hypertrophy BPH is the possible Etiology-   Started Flomax     IDDM- persistent hyperglycemia-   Hold off on FS with coverage for now- patient is hypoglycemic- probably because of non clearance of insulin and meds because of renal failure     Hyperlipidemia- statin    Hypertension-discontinue lisinopril- hydrochlorothiazide    GERD- hold PPI with renal failure     Neuropathy- cont- gabapentin 89 year old creole speaking male with medical hx significant for IDDM , hypertension, hyperlipidemia, GERD and ARF -is BIBF for lethargy and confusion for 3 days and in ED found to have urinary retention  ARF-with hyperkalemiapost renal as there was 2 liters with Kaiser insertion  unknown baseline - with advanced age-   possible prostate hypertrophy BPH is the possible Etiology-   Started Flomax   sodium bicarb, lokelma   Dr Archer Ren[facility] to follow   IDDM- persistent hyperglycemia-   Hold off on FS with coverage for now- patient is hypoglycemic- probably because of non clearance of insulin and meds because of renal failure     Hyperlipidemia- statin    Hypertension-discontinue lisinopril- hydrochlorothiazide    GERD- hold PPI with renal failure     Neuropathy- cont- gabapentin 89 year old creole speaking male with medical hx significant for IDDM , hypertension, hyperlipidemia, GERD and ARF -is BIBF for lethargy and confusion for 3 days and in ED found to have urinary retention  ARF-with hyperkalemiapost renal as there was 2 liters with Kaiser insertion  unknown baseline - with advanced age-   possible prostate hypertrophy BPH is the possible Etiology-   Started Flomax , Lasix   sodium bicarb, lokelma   Dr Archer Renal to follow   IDDM- persistent hyperglycemia-   Hold off on FS with coverage for now- patient is hypoglycemic- probably because of non clearance of insulin and meds because of renal failure     Hyperlipidemia- statin    Hypertension-discontinue lisinopril- hydrochlorothiazide    GERD- hold PPI with renal failure     Neuropathy- cont- gabapentin 89 year old creole speaking male with medical hx significant for IDDM , hypertension, hyperlipidemia, GERD and ARF -is BIBF for lethargy and confusion for 3 days and in ED found to have urinary retention  ARF-with hyperkalemiapost renal as there was 2 liters with Kaiser insertion  unknown baseline - with advanced age- DM , HTn- Stop OHG agents and AECI and PPI  possible prostate hypertrophy BPH is the possible Etiology-   Started Flomax , Lasix   sodium bicarb, lokelma   Dr Archer Renal to follow   IDDM- persistent hypoglycemia-   Hold off on FS with coverage for now- patient is hypoglycemic- probably because of non clearance of insulin and meds because of renal failure     Hyperlipidemia- statin    Hypertension-discontinue lisinopril- hydrochlorothiazide    GERD- hold PPI with renal failure     Neuropathy- cont- gabapentin

## 2022-05-25 NOTE — CONSULT NOTE ADULT - SUBJECTIVE AND OBJECTIVE BOX
Patient is a 89y old  Male who presents with a chief complaint of Acute renal failure- hyperkalemia- hypoglycemia (25 May 2022 16:36)   Acute  renal failure.    HPI:  89 year old creole speaking male with medical hx significant for IDDM , hypertension, hyperlipidemia, GERD and ARF -is BIBF for lethargy and confusion for 3 days  and in Ed found to have urinary retention- it was missed by family as he is in a diaper and the diapers were getting wet, In ED is found to be in ARF and 2 liter urine drained with the Conley catheter.  In ED patient is AAO times 3 in ED but was lethargic and some what confused for past 3 days   As per family reports patient's sugar was 35 this morning, given po with increase to 70. Pt has been on glimepiride and lisinopril . Hx from  family and  chart, pt poorly verbal.       PAST MEDICAL & SURGICAL HISTORY:  Diabetes      A-fib      Hypertension      Neuropathy      No significant past surgical history           FAMILY HISTORY:  No pertinent family history in first degree relatives    [place]    Social History: Non smoker    MEDICATIONS  (STANDING):  acetaminophen     Tablet .. 650 milliGRAM(s) Oral every 6 hours  aspirin enteric coated 81 milliGRAM(s) Oral daily  dextrose 5% 1000 milliLiter(s) (125 mL/Hr) IV Continuous <Continuous>  dorzolamide 2%/timolol 0.5% Ophthalmic Solution 1 Drop(s) Both EYES two times a day  furosemide   Injectable 40 milliGRAM(s) IV Push once  gabapentin Oral Tab/Cap - Peds 300 milliGRAM(s) Oral daily  simvastatin 40 milliGRAM(s) Oral at bedtime  sodium zirconium cyclosilicate 10 Gram(s) Oral once  tamsulosin 0.8 milliGRAM(s) Oral at bedtime    MEDICATIONS  (PRN):   Meds reviewed    Allergies    No Known Allergies        REVIEW OF SYSTEMS:      As above.      ALLERY AND IMMUNOLOGIC: No hives or eczema      Vital Signs Last 24 Hrs  T(C): 36.7 (25 May 2022 16:00), Max: 36.9 (25 May 2022 14:00)  T(F): 98.1 (25 May 2022 16:00), Max: 98.4 (25 May 2022 14:00)  HR: 121 (25 May 2022 16:00) (82 - 121)  BP: 116/73 (25 May 2022 16:00) (102/59 - 168/99)  BP(mean): --  RR: 18 (25 May 2022 16:00) (16 - 24)  SpO2: 100% (25 May 2022 16:00) (92% - 100%)  Daily Height in cm: 172.72 (25 May 2022 10:20)         PHYSICAL EXAM:    GENERAL: appears chronically ill,   HEAD:  Atraumatic, Normocephalic  EYES: EOMI, PERRLA, conjunctiva and sclera clear  ENMT: No tonsillar erythema, exudates, or enlargement; dry  NECK: Supple, neck  veins flat  NERVOUS SYSTEM:  Opens  eyes, not verbal  even with    CHEST/LUNG: Clear to percussion bilaterally; No rales, rhonchi, wheezing, or rubs  HEART: Regular rate and rhythm; No murmurs, rubs, or gallops  ABDOMEN: Soft, Distended, umbilical  small hernia  EXTREMITIES:  Lower leg edema and pedal, stasis  changes left lower leg  LYMPH: No lymphadenopathy noted  SKIN: No rashes or lesions, pale  : conley with large volume pink  urine.    LABS:                        11.6   9.97  )-----------( 313      ( 25 May 2022 11:16 )             34.9     05-25    135  |  101  |  126.8<H>  ----------------------------<  65<L>  6.2<HH>   |  20.0<L>  |  7.71<H>    Ca    10.3<H>      25 May 2022 14:21    TPro  6.7  /  Alb  3.2<L>  /  TBili  0.2<L>  /  DBili  x   /  AST  9   /  ALT  12  /  AlkPhos  59  05-25    PT/INR - ( 25 May 2022 11:16 )   PT: 12.5 sec;   INR: 1.08 ratio         PTT - ( 25 May 2022 11:16 )  PTT:29.5 sec  Urinalysis Basic - ( 25 May 2022 11:17 )    Color: Yellow / Appearance: Clear / S.010 / pH: x  Gluc: x / Ketone: Negative  / Bili: Negative / Urobili: Negative mg/dL   Blood: x / Protein: 15 / Nitrite: Negative   Leuk Esterase: Trace / RBC: 25-50 /HPF / WBC 6-10 /HPF   Sq Epi: x / Non Sq Epi: Few / Bacteria: Few              RADIOLOGY & ADDITIONAL TESTS:

## 2022-05-26 LAB
-  COAGULASE NEGATIVE STAPHYLOCOCCUS: SIGNIFICANT CHANGE UP
ALBUMIN SERPL ELPH-MCNC: 2.9 G/DL — LOW (ref 3.3–5.2)
ALP SERPL-CCNC: 52 U/L — SIGNIFICANT CHANGE UP (ref 40–120)
ALT FLD-CCNC: 10 U/L — SIGNIFICANT CHANGE UP
AMMONIA BLD-MCNC: 15 UMOL/L — SIGNIFICANT CHANGE UP (ref 11–55)
ANION GAP SERPL CALC-SCNC: 15 MMOL/L — SIGNIFICANT CHANGE UP (ref 5–17)
AST SERPL-CCNC: 9 U/L — SIGNIFICANT CHANGE UP
BASOPHILS # BLD AUTO: 0.04 K/UL — SIGNIFICANT CHANGE UP (ref 0–0.2)
BASOPHILS NFR BLD AUTO: 0.4 % — SIGNIFICANT CHANGE UP (ref 0–2)
BILIRUB SERPL-MCNC: 0.3 MG/DL — LOW (ref 0.4–2)
BUN SERPL-MCNC: 96.3 MG/DL — HIGH (ref 8–20)
CALCIUM SERPL-MCNC: 10.1 MG/DL — SIGNIFICANT CHANGE UP (ref 8.6–10.2)
CHLORIDE SERPL-SCNC: 105 MMOL/L — SIGNIFICANT CHANGE UP (ref 98–107)
CO2 SERPL-SCNC: 23 MMOL/L — SIGNIFICANT CHANGE UP (ref 22–29)
CREAT SERPL-MCNC: 4.73 MG/DL — HIGH (ref 0.5–1.3)
CULTURE RESULTS: NO GROWTH — SIGNIFICANT CHANGE UP
EGFR: 10 ML/MIN/1.73M2 — LOW
EOSINOPHIL # BLD AUTO: 0.07 K/UL — SIGNIFICANT CHANGE UP (ref 0–0.5)
EOSINOPHIL NFR BLD AUTO: 0.7 % — SIGNIFICANT CHANGE UP (ref 0–6)
GLUCOSE BLDC GLUCOMTR-MCNC: 107 MG/DL — HIGH (ref 70–99)
GLUCOSE BLDC GLUCOMTR-MCNC: 113 MG/DL — HIGH (ref 70–99)
GLUCOSE BLDC GLUCOMTR-MCNC: 148 MG/DL — HIGH (ref 70–99)
GLUCOSE BLDC GLUCOMTR-MCNC: 178 MG/DL — HIGH (ref 70–99)
GLUCOSE SERPL-MCNC: 228 MG/DL — HIGH (ref 70–99)
GRAM STN FLD: SIGNIFICANT CHANGE UP
HCT VFR BLD CALC: 34.2 % — LOW (ref 39–50)
HGB BLD-MCNC: 11.4 G/DL — LOW (ref 13–17)
IMM GRANULOCYTES NFR BLD AUTO: 0.6 % — SIGNIFICANT CHANGE UP (ref 0–1.5)
LYMPHOCYTES # BLD AUTO: 1.82 K/UL — SIGNIFICANT CHANGE UP (ref 1–3.3)
LYMPHOCYTES # BLD AUTO: 18.8 % — SIGNIFICANT CHANGE UP (ref 13–44)
MCHC RBC-ENTMCNC: 27.8 PG — SIGNIFICANT CHANGE UP (ref 27–34)
MCHC RBC-ENTMCNC: 33.3 GM/DL — SIGNIFICANT CHANGE UP (ref 32–36)
MCV RBC AUTO: 83.4 FL — SIGNIFICANT CHANGE UP (ref 80–100)
METHOD TYPE: SIGNIFICANT CHANGE UP
MONOCYTES # BLD AUTO: 1.16 K/UL — HIGH (ref 0–0.9)
MONOCYTES NFR BLD AUTO: 12 % — SIGNIFICANT CHANGE UP (ref 2–14)
NEUTROPHILS # BLD AUTO: 6.55 K/UL — SIGNIFICANT CHANGE UP (ref 1.8–7.4)
NEUTROPHILS NFR BLD AUTO: 67.5 % — SIGNIFICANT CHANGE UP (ref 43–77)
ORGANISM # SPEC MICROSCOPIC CNT: SIGNIFICANT CHANGE UP
PLATELET # BLD AUTO: 330 K/UL — SIGNIFICANT CHANGE UP (ref 150–400)
POTASSIUM SERPL-MCNC: 4.6 MMOL/L — SIGNIFICANT CHANGE UP (ref 3.5–5.3)
POTASSIUM SERPL-SCNC: 4.6 MMOL/L — SIGNIFICANT CHANGE UP (ref 3.5–5.3)
PROT SERPL-MCNC: 6.4 G/DL — LOW (ref 6.6–8.7)
RBC # BLD: 4.1 M/UL — LOW (ref 4.2–5.8)
RBC # FLD: 12.6 % — SIGNIFICANT CHANGE UP (ref 10.3–14.5)
SODIUM SERPL-SCNC: 143 MMOL/L — SIGNIFICANT CHANGE UP (ref 135–145)
SPECIMEN SOURCE: SIGNIFICANT CHANGE UP
SPECIMEN SOURCE: SIGNIFICANT CHANGE UP
WBC # BLD: 9.7 K/UL — SIGNIFICANT CHANGE UP (ref 3.8–10.5)
WBC # FLD AUTO: 9.7 K/UL — SIGNIFICANT CHANGE UP (ref 3.8–10.5)

## 2022-05-26 PROCEDURE — 99222 1ST HOSP IP/OBS MODERATE 55: CPT

## 2022-05-26 PROCEDURE — 99233 SBSQ HOSP IP/OBS HIGH 50: CPT

## 2022-05-26 RX ORDER — INSULIN LISPRO 100/ML
VIAL (ML) SUBCUTANEOUS
Refills: 0 | Status: DISCONTINUED | OUTPATIENT
Start: 2022-05-26 | End: 2022-05-27

## 2022-05-26 RX ORDER — DEXTROSE 50 % IN WATER 50 %
12.5 SYRINGE (ML) INTRAVENOUS ONCE
Refills: 0 | Status: DISCONTINUED | OUTPATIENT
Start: 2022-05-26 | End: 2022-06-07

## 2022-05-26 RX ORDER — SODIUM CHLORIDE 9 MG/ML
1000 INJECTION, SOLUTION INTRAVENOUS
Refills: 0 | Status: DISCONTINUED | OUTPATIENT
Start: 2022-05-26 | End: 2022-06-07

## 2022-05-26 RX ORDER — DEXTROSE 50 % IN WATER 50 %
25 SYRINGE (ML) INTRAVENOUS ONCE
Refills: 0 | Status: DISCONTINUED | OUTPATIENT
Start: 2022-05-26 | End: 2022-06-07

## 2022-05-26 RX ORDER — DEXTROSE 50 % IN WATER 50 %
15 SYRINGE (ML) INTRAVENOUS ONCE
Refills: 0 | Status: DISCONTINUED | OUTPATIENT
Start: 2022-05-26 | End: 2022-06-07

## 2022-05-26 RX ORDER — CEFAZOLIN SODIUM 1 G
1000 VIAL (EA) INJECTION ONCE
Refills: 0 | Status: COMPLETED | OUTPATIENT
Start: 2022-05-26 | End: 2022-05-26

## 2022-05-26 RX ORDER — GLUCAGON INJECTION, SOLUTION 0.5 MG/.1ML
1 INJECTION, SOLUTION SUBCUTANEOUS ONCE
Refills: 0 | Status: DISCONTINUED | OUTPATIENT
Start: 2022-05-26 | End: 2022-06-07

## 2022-05-26 RX ADMIN — Medication 100 MILLIGRAM(S): at 09:13

## 2022-05-26 RX ADMIN — DORZOLAMIDE HYDROCHLORIDE TIMOLOL MALEATE 1 DROP(S): 20; 5 SOLUTION/ DROPS OPHTHALMIC at 18:23

## 2022-05-26 RX ADMIN — DORZOLAMIDE HYDROCHLORIDE TIMOLOL MALEATE 1 DROP(S): 20; 5 SOLUTION/ DROPS OPHTHALMIC at 05:53

## 2022-05-26 RX ADMIN — SODIUM CHLORIDE 125 MILLILITER(S): 9 INJECTION, SOLUTION INTRAVENOUS at 18:22

## 2022-05-26 RX ADMIN — SODIUM CHLORIDE 125 MILLILITER(S): 9 INJECTION, SOLUTION INTRAVENOUS at 23:34

## 2022-05-26 RX ADMIN — Medication 81 MILLIGRAM(S): at 12:05

## 2022-05-26 NOTE — SWALLOW BEDSIDE ASSESSMENT ADULT - ORAL PHASE
Delayed oral transit time Within functional limits reduced orientation to bolus/Delayed oral transit time

## 2022-05-26 NOTE — PROGRESS NOTE ADULT - SUBJECTIVE AND OBJECTIVE BOX
NEPHROLOGY INTERVAL HPI/OVERNIGHT EVENTS:    No new events.    MEDICATIONS  (STANDING):  acetaminophen     Tablet .. 650 milliGRAM(s) Oral every 6 hours  aspirin enteric coated 81 milliGRAM(s) Oral daily  dextrose 5% 1000 milliLiter(s) (125 mL/Hr) IV Continuous <Continuous>  dorzolamide 2%/timolol 0.5% Ophthalmic Solution 1 Drop(s) Both EYES two times a day  gabapentin Oral Tab/Cap - Peds 300 milliGRAM(s) Oral daily  simvastatin 40 milliGRAM(s) Oral at bedtime  tamsulosin 0.8 milliGRAM(s) Oral at bedtime    MEDICATIONS  (PRN):      Allergies    No Known Allergies            Vital Signs Last 24 Hrs  T(C): 36.4 (26 May 2022 04:45), Max: 36.9 (25 May 2022 14:00)  T(F): 97.5 (26 May 2022 04:45), Max: 98.4 (25 May 2022 14:00)  HR: 67 (26 May 2022 04:45) (67 - 129)  BP: 101/64 (26 May 2022 04:45) (99/61 - 168/99)  BP(mean): --  RR: 16 (26 May 2022 04:45) (16 - 24)  SpO2: 96% (26 May 2022 04:45) (92% - 100%)  Daily Height in cm: 172.72 (25 May 2022 10:20)        PHYSICAL EXAM:    GENERAL: Appears  comfortable in bed  HEAD:  wnl  EYES:   ENMT:   NECK: veins  flat  NERVOUS SYSTEM:  More awake, verbal this am  CHEST/LUNG: No wheezes  HEART: No gallop or  rub noted  ABDOMEN: NT  EXTREMITIES:  Leg edema  much better  LYMPH:   SKIN: no  rash  : Kaiser  with pink urine, large volume noted, I&O noted    LABS:    today's labs  pending                        11.6   9.97  )-----------( 313      ( 25 May 2022 11:16 )             34.9     05-    135  |  101  |  126.8<H>  ----------------------------<  65<L>  6.2<HH>   |  20.0<L>  |  7.71<H>    Ca    10.3<H>      25 May 2022 14:21    TPro  6.7  /  Alb  3.2<L>  /  TBili  0.2<L>  /  DBili  x   /  AST  9   /  ALT  12  /  AlkPhos  59  05-25    PT/INR - ( 25 May 2022 11:16 )   PT: 12.5 sec;   INR: 1.08 ratio         PTT - ( 25 May 2022 11:16 )  PTT:29.5 sec  Urinalysis Basic - ( 25 May 2022 11:17 )    Color: Yellow / Appearance: Clear / S.010 / pH: x  Gluc: x / Ketone: Negative  / Bili: Negative / Urobili: Negative mg/dL   Blood: x / Protein: 15 / Nitrite: Negative   Leuk Esterase: Trace / RBC: 25-50 /HPF / WBC 6-10 /HPF   Sq Epi: x / Non Sq Epi: Few / Bacteria: Few              RADIOLOGY & ADDITIONAL TESTS:

## 2022-05-26 NOTE — SWALLOW BEDSIDE ASSESSMENT ADULT - ORAL PREPARATORY PHASE
Reduced oral grading Reduced oral grading/Decreased mastication ability Decreased mastication ability Within functional limits

## 2022-05-26 NOTE — SWALLOW BEDSIDE ASSESSMENT ADULT - NS SPL SWALLOW CLINIC TRIAL FT
Pt with acceptance of 1x tsp, encouragement for further trials given, however, received with poor benefit

## 2022-05-26 NOTE — CONSULT NOTE ADULT - ASSESSMENT
This is a 89 year old male PMhx IDDM , hypertension, hyperlipidemia, GERD and ARF arrived to [facility] for lethargy and confusion. Found to have urinary retention, 2 L of urine drained in ED. Patient is creole speaking - noted to be confused and lethargic for 3 days at home. Also noted by family that patient's blood sugar was as low as 35 today.  No reports of chest pain, sob, fever, chills, n/v/d/c. Admitted to team of medicine for Acute renal failure- hyperkalemia- hypoglycemia. Called for palliative care consult to discuss GOC as previously was DNR/DNI now Full code.     Problem/Recommendation 1:Metabolic Encephalopathy  [place] patient to surroundings  Maintain safe well lit environment   Currently NPO for aspiration precautions/safety  Acute renal failure - nephrology following     Problem/Recommendation 2: Bacteremia  Blood cultures positive  Trend fevers  Given a dose of Cefazolin    Problem/Recommendation 3: Debility  Assist in ADLS  Maintain safety, fall, aspiration precautions    Problem/Recommendation 4: Palliative Care Encounter  Met with patient at bedside- lethargic but able to open his eyes with verbal/physical stimuli  Appears comfortable   Patient seen in 2019 by Palliative care team at which time MOLST for DNR/DNI was established by Lisset Benavides  Call placed to Lisset to discuss GOC but she was unable to speak because she was at work   Informed Lisset I will reach out again tomorrow   Palliative care to continue to follow   Discussed case with Dr. Patterson    Total Time Spent_50___ minutes  This includes chart review, patient assessment, discussion and collaboration with interdisciplinary team members, ACP planning    Thank you for the opportunity to assist with the care of this patient.   [facility] Palliative Medicine Consult Service [phone].  This is a 89 year old male PMhx IDDM , hypertension, hyperlipidemia, GERD and ARF arrived to [facility] for lethargy and confusion. Found to have urinary retention, 2 L of urine drained in ED. Patient is creole speaking - noted to be confused and lethargic for 3 days at home. Also noted by family that patient's blood sugar was as low as 35 today.  No reports of chest pain, sob, fever, chills, n/v/d/c. Admitted to team of medicine for Acute renal failure- hyperkalemia- hypoglycemia. Called for palliative care consult to discuss GOC as previously was DNR/DNI now Full code.     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  this would be an acceptable hospice dx if patient not pursuing oncologic interventions    Problem/Recommendation 4: Debility  Assist in ADLS  Maintain safety, fall, aspiration precautions    Problem/Recommendation 5: Palliative Care Encounter  Met with patient at bedside- lethargic but able to open his eyes with verbal/physical stimuli  Appears comfortable   Patient seen in 2019 by Palliative care team at which time MOLST for DNR/DNI was established by Lisset Benavides  Call placed to Lisset to discuss GOC but she was unable to speak because she was at work   Informed Lisset I will reach out again tomorrow   Palliative care to continue to follow   Discussed case with Dr. Patterson    Total Time Spent_50___ minutes  This includes chart review, patient assessment, discussion and collaboration with interdisciplinary team members, ACP planning    Thank you for the opportunity to assist with the care of this patient.   [facility] Palliative Medicine Consult Service [phone].

## 2022-05-26 NOTE — SWALLOW BEDSIDE ASSESSMENT ADULT - SLP GENERAL OBSERVATIONS
Pt received asleep in bed, Pt received upright in bed, asleep, awoken for assessment purposes, eyes closed mostly throughout assessment with cues to remain open with poor benefit, reduced cognition, +lethargy, suspected reduced hearing acuity, Ox0, [place] Creole  utilized #[id number], 0/10 pain pre & post

## 2022-05-26 NOTE — CONSULT NOTE ADULT - SUBJECTIVE AND OBJECTIVE BOX
Palliative Care Consult  This is a 89 year old male PMhx IDDM , hypertension, hyperlipidemia, GERD and ARF arrived to [facility] for lethargy and confusion. Found to have urinary retention, 2 L of urine drained in ED. Patient is creole speaking - noted to be confused and lethargic for 3 days at home. Also noted by family that patient's blood sugar was as low as 35 today.  No reports of chest pain, sob, fever, chills, n/v/d/c. Admitted to team of medicine for Acute renal failure- hyperkalemia- hypoglycemia. Called for palliative care consult to discuss GOC as previously was DNR/DNI now Full code.     <HPI:  89 year old creole speaking male with medical hx significant for IDDM , hypertension, hyperlipidemia, GERD and ARF -is BIBF for lethargy and confusion for 3 days  and in Ed found to have urinary retention- it was missed by family as he is in a diaper and the diapers were getting wet, In ED is found to be in ARF and 2 liter urine drained with the Conley catheter.  In ED patient is AAO (with creole ) in ED but was lethargic and ? has been confused for past 3 days at home and inconsistent at answering questions even with creole speaking NA at bedside.  As per family reports patient's sugar was 35 this morning, given po with increase to 70.    (25 May 2022 16:36)>end of copied text    PERTINENT PMH REVIEWED: Yes     PAST MEDICAL & SURGICAL HISTORY:  Diabetes      A-fib      Hypertension      Neuropathy      No significant past surgical history    SOCIAL HISTORY:                      Substance history: none                    Admitted from:  home                     Roman Catholic/spirituality: Latter day                    Cultural concerns: unk                      Surrogate/HCP/Guardian: Phone#: Lisset Benavides (daughter) [phone]    FAMILY HISTORY:  No family history related to admission diagonsis    Allergies  No Known Allergies    ADVANCE DIRECTIVES/TREATMENT PREFERENCES:  Full Code    Baseline ADLs (prior to admission):  Dependent      Karnofsky/Palliative Performance Status Version 2:  %30  http://npcrc.org/files/news/[email or web address]_performance_scale_[email or web address]    Present Symptoms:   Dyspnea: Unable to obtain due to poor mentation   Nausea/Vomiting: Unable to obtain due to poor mentation   Anxiety:  Unable to obtain due to poor mentation   Depression: Unable to obtain due to poor mentation   Fatigue: Unable to obtain due to poor mentation    Loss of appetite: Unable to obtain due to poor mentation   Constipation: Unable to obtain due to poor mentation     Pain: Appears comfortable            Character-            Duration-            Effect-            Factors-            Frequency-            Location-            Severity-    Pain AD Score:0  [email or web address]/[email or web address] (press ctrl + left click to view)    Review of Systems: Reviewed  Unable to obtain due to poor mentation   All others negative    MEDICATIONS  (STANDING):  aspirin enteric coated 81 milliGRAM(s) Oral daily  dextrose 5% 1000 milliLiter(s) (125 mL/Hr) IV Continuous <Continuous>  dextrose 5%. 1000 milliLiter(s) (50 mL/Hr) IV Continuous <Continuous>  dextrose 5%. 1000 milliLiter(s) (100 mL/Hr) IV Continuous <Continuous>  dextrose 50% Injectable 25 Gram(s) IV Push once  dextrose 50% Injectable 12.5 Gram(s) IV Push once  dextrose 50% Injectable 25 Gram(s) IV Push once  dorzolamide 2%/timolol 0.5% Ophthalmic Solution 1 Drop(s) Both EYES two times a day  glucagon  Injectable 1 milliGRAM(s) IntraMuscular once  insulin lispro (ADMELOG) corrective regimen sliding scale   SubCutaneous three times a day before meals    MEDICATIONS  (PRN):  dextrose Oral Gel 15 Gram(s) Oral once PRN Blood Glucose LESS THAN 70 milliGRAM(s)/deciliter      PHYSICAL EXAM:    Vital Signs Last 24 Hrs  T(C): 36.3 (26 May 2022 11:10), Max: 36.9 (25 May 2022 14:00)  T(F): 97.4 (26 May 2022 11:10), Max: 98.4 (25 May 2022 14:00)  HR: 70 (26 May 2022 07:43) (67 - 129)  BP: 104/69 (26 May 2022 11:10) (98/60 - 133/89)  BP(mean): --  RR: 16 (26 May 2022 07:43) (16 - 20)  SpO2: 97% (26 May 2022 07:43) (96% - 100%)    General: _ lethargic but able to open his eyes with verbal/physical stimuli    HEENT: dry mouth    Lungs: comfortable    CV: normal      GI: incontinent     : incontinent   conley    MSK: weakness  bedbound    Neuro: lethargic , confuesd    Skin: thin frail dry skin    LABS:                        11.6   9.97  )-----------( 313      ( 25 May 2022 11:16 )             34.9         135  |  101  |  126.8<H>  ----------------------------<  65<L>  6.2<HH>   |  20.0<L>  |  7.71<H>    Ca    10.3<H>      25 May 2022 14:21  TPro  6.7  /  Alb  3.2<L>  /  TBili  0.2<L>  /  DBili  x   /  AST  9   /  ALT  12  /  AlkPhos  59    PT/INR - ( 25 May 2022 11:16 )   PT: 12.5 sec;   INR: 1.08 ratio    PTT - ( 25 May 2022 11:16 )  PTT:29.5 sec  Urinalysis Basic - ( 25 May 2022 11:17 )  Color: Yellow / Appearance: Clear / S.010 / pH: x  Gluc: x / Ketone: Negative  / Bili: Negative / Urobili: Negative mg/dL   Blood: x / Protein: 15 / Nitrite: Negative   Leuk Esterase: Trace / RBC: 25-50 /HPF / WBC 6-10 /HPF   Sq Epi: x / Non Sq Epi: Few / Bacteria: Few      I&O's Summary  25 May 2022 07:01  -  26 May 2022 07:00  --------------------------------------------------------  IN: 0 mL / OUT: 6325 mL / NET: -6325 mL    RADIOLOGY & ADDITIONAL STUDIES:    CXR 22  Impression: Clear lungs with no significant cardiopulmonary abnormalities.      In 2019 CT scan of Abd/Pelvis  IMPRESSION:  Uncomplicated cholelithiasis.  Collapsed stomach with incompletely characterized exophytic lesion   measuring 4 x 3.6 cm in the fundal portion. 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 GI consultation and further workup as   indicated. No evidence of small bowel obstruction or extraluminal   collection.   Incompletely characterized sub-cm left adrenal nodule.  Heterogeneously prominent prostate with nodular density near the apex and   bladder protrusion. Mild bladder wall thickening with moderate   distention. This may related to chronic bladder obstruction from enlarged   prostate. Correlate with urinalysis to assess for infection. Consider   direct visualization if there is concern for bladder malignancy.  Too small to characterize bilateral subpleural based nodules for which   nonemergent short-term pulmonary imaging follow-up is advised.

## 2022-05-26 NOTE — SWALLOW BEDSIDE ASSESSMENT ADULT - PHARYNGEAL PHASE
Initially deemed functional, however, unable to r/o risk of aspiration as evidenced via overt s/s aspiration at completion of PO intake Delayed cough post oral intake

## 2022-05-26 NOTE — SWALLOW BEDSIDE ASSESSMENT ADULT - SPECIFY REASON(S)
ASSESSMENT/  PLAN:  Insect bite of right forearm, initial encounter     Has blood under the skin from past insect bite causing little bleeding in the skin  Reassured patient she does not have the target type lesion from tick bite with Lyme disease.  No signs of inflammation.  She does not need prophylactic treatment for lyme       Patient should return to  or primary MD if worsening       ------------------------------------------------------------------------------------------------------------------------------------------------------------------    SUBJECTIVE:  Chief Complaint   Patient presents with     Urgent Care     Insect Bites     Bug bite right forearm-Noticed on Monday      Mariam Arellano is a 63 year old female who presents with a chief complaint of an insect bite on the right  forearm.   Was bitten by a unknown insect 3-4 days ago.    Severity: mild.    Associated symptoms:  No swelling, no edema-   Brown blood infiltrated under the skin  No problems of wheezing, mouth/ throat swelling, shortness of breath.  No hives  No fevers or chills    last tetanus booster within 10 years    Past Medical History:   Diagnosis Date     Disorder of bone and cartilage, unspecified      Hypertension      Patient Active Problem List   Diagnosis     Family history of ischemic heart disease     ACP (advance care planning)     Essential hypertension with goal blood pressure less than 140/90     Osteopenia of multiple sites       ALLERGIES:  Amoxicillin and Sulfamethazine    acetaminophen (TYLENOL) 325 MG tablet, Take 325-650 mg by mouth every 6 hours as needed for mild pain  ASPIRIN 81 MG PO TABS, ONE DAILY  CALCIUM 500 + D 500-200 MG-IU OR TABS, 1 tab bid  cholecalciferol (VITAMIN D) 1000 UNIT tablet, Take 1 tablet (1,000 Units) by mouth daily  Cyanocobalamin (B-12) 1000 MCG TBCR, Take 1,000 mcg by mouth daily  fish oil-omega-3 fatty acids (OMEGA 3) 1000 MG capsule, Take by mouth 2 times daily  losartan (COZAAR)  25 MG tablet, Take 1 tablet (25 mg) by mouth daily  MULTI-DAY VITAMINS OR, 1 qd  triamterene-HCTZ (MAXZIDE-25) 37.5-25 MG tablet, Take 1 tablet by mouth daily  estradiol (VIVELLE-DOT) 0.025 MG/24HR bi-weekly patch, Place 1 patch onto the skin twice a week (Patient not taking: Reported on 9/10/2021)  medroxyPROGESTERone (PROVERA) 2.5 MG tablet, Take 2.5 mg by mouth daily (Patient not taking: Reported on 9/10/2021)    No current facility-administered medications on file prior to visit.      Social History     Tobacco Use     Smoking status: Never Smoker     Smokeless tobacco: Never Used   Substance Use Topics     Alcohol use: Yes     Alcohol/week: 0.0 standard drinks     Comment: 1-2 glasses of wine qod       Family History   Problem Relation Age of Onset     Osteoporosis Mother         born 1932     Asthma Mother      Hypertension Mother      Allergies Mother      Cerebrovascular Disease Mother      Lipids Father         born 1930     Hypertension Father      C.A.D. Father      Dementia Father      Osteoporosis Maternal Grandmother      Cancer Maternal Grandmother      Thyroid Disease Sister         brain aneurysm 57yo surgical repair and vertigo/Meniere's     Osteoporosis Sister      C.A.D. Paternal Grandfather      Hypertension Paternal Grandfather      Lipids Paternal Grandfather      Family History Negative Brother      Psychotic Disorder Daughter 13        depression     Colon Cancer No family hx of        ROS:  CONSTITUTIONAL:NEGATIVE for fever, chills,    EYES: NEGATIVE for vision changes or irritation  ENT/MOUTH: NEGATIVE for ear, mouth and throat problems  RESP:NEGATIVE for significant cough or SOB  GI: NEGATIVE for nausea, abdominal pain,  or change in bowel habits    OBJECTIVE:  BP (!) 154/82   Pulse 73   Temp 98.1  F (36.7  C) (Oral)   Resp 12   LMP 12/15/2013   SpO2 100%   Breastfeeding No   GENERAL APPEARANCE: healthy, alert and no distress       EYES:   EOMI,   conjunctiva clear  HENT:   External  ears with no swelling or lesions   Nose and lips without  Swelling, ulcers, erythema or lesions  NECK:   normal pain free ROM  RESP:   no labored respirations, no tachypnea  EXTREMITIES:   Full ROM without expression of pain or limitation x 4 extremities  NEURO:   Normal strength and tone, ambulation without difficulty,   normal speech and mentation      SKIN: blood infiltration of the skin with brown bruise  of right forearm    Size  4 x 4 cm  Has no local swelling  Has no local erythema      r/o dysphagia

## 2022-05-26 NOTE — PROGRESS NOTE ADULT - SUBJECTIVE AND OBJECTIVE BOX
JOHNATHON SEVERINO  ----------------------------------------  The patient was seen at bedside. Patient with urine retention and acute kidney injury. Somnolent but awakened with voice. Appears in no distress.    Vital Signs Last 24 Hrs  T(C): 36.3 (26 May 2022 07:43), Max: 36.9 (25 May 2022 14:00)  T(F): 97.4 (26 May 2022 07:43), Max: 98.4 (25 May 2022 14:00)  HR: 70 (26 May 2022 07:43) (67 - 129)  BP: 98/60 (26 May 2022 07:43) (98/60 - 168/82)  BP(mean): --  RR: 16 (26 May 2022 07:43) (16 - 22)  SpO2: 97% (26 May 2022 07:43) (92% - 100%)    CAPILLARY BLOOD GLUCOSE  POCT Blood Glucose.: 107 mg/dL (26 May 2022 06:03)  POCT Blood Glucose.: 184 mg/dL (25 May 2022 17:41)  POCT Blood Glucose.: 74 mg/dL (25 May 2022 14:03)    PHYSICAL EXAMINATION:  ----------------------------------------  General appearance: No acute distress, Awake, Alert  HEENT: Normocephalic, Atraumatic, Conjunctiva clear, EOMI  Neck: Supple, No JVD, No tenderness  Lungs: Breath sound equal bilaterally, No wheezes, No rales  Cardiovascular: S1S2, Regular rhythm  Abdomen: Soft, Nontender, Nondistended, No guarding/rebound, Positive bowel sounds  Extremities: No clubbing, No cyanosis, No edema, No calf tenderness, Chronic skin changes on the lower extremities  Neuro: Strength equal bilaterally, No tremors  Psychiatric: Appropriate mood, Normal affect    LABORATORY STUDIES:  ----------------------------------------             11.6   9.97  )-----------( 313      ( 25 May 2022 11:16 )             34.9         135  |  101  |  126.8<H>  ----------------------------<  65<L>  6.2<HH>   |  20.0<L>  |  7.71<H>    Ca    10.3<H>      25 May 2022 14:21    TPro  6.7  /  Alb  3.2<L>  /  TBili  0.2<L>  /  DBili  x   /  AST  9   /  ALT  12  /  AlkPhos  59  -    LIVER FUNCTIONS - ( 25 May 2022 14:21 )  Alb: 3.2 g/dL / Pro: 6.7 g/dL / ALK PHOS: 59 U/L / ALT: 12 U/L / AST: 9 U/L / GGT: x           PT/INR - ( 25 May 2022 11:16 )   PT: 12.5 sec;   INR: 1.08 ratio    PTT - ( 25 May 2022 11:16 )  PTT:29.5 sec    Urinalysis Basic - ( 25 May 2022 11:17 )  Color: Yellow / Appearance: Clear / S.010 / pH: x  Gluc: x / Ketone: Negative  / Bili: Negative / Urobili: Negative mg/dL   Blood: x / Protein: 15 / Nitrite: Negative   Leuk Esterase: Trace / RBC: 25-50 /HPF / WBC 6-10 /HPF   Sq Epi: x / Non Sq Epi: Few / Bacteria: Few    Culture - Blood (collected 25 May 2022 11:13)  Source: .Blood Blood-Peripheral  Gram Stain (26 May 2022 07:33):    Growth in anaerobic bottle: Gram Positive Cocci in Clusters    ***Blood Panel PCR results on this specimen are available    approximately 3 hours after the Gram stain result.***    Gram stain, PCR, and/or culture results may not always    correspond due todifference in methodologies.    ************************************************************    This PCR assay was performed using [employer].    The following targets are tested for: Enterococcus,    vancomycin resistant enterococci, Listeria monocytogenes,    coagulase negative staphylococci, S. aureus,    methicillin resistant S. aureus, Streptococcus agalactiae    (Group B), S. pneumoniae, S. pyogenes (Group A),    Acinetobacter baumannii, Enterobacter cloacae, E. coli,    Klebsiella oxytoca, K. pneumoniae, Proteus sp.,    Serratia marcescens, Haemophilus influenzae,    Neisseria meningitidis, Pseudomonas aeruginosa, Candida    albicans, C. glabrata, C krusei, C parapsilosis,    C. tropicalis and the KPC resistance gene.    Gram Stain and BCID performed by:    [facility]    [address] [place] [id number]    .    TYPE: (C=Critical, N=Notification, A=Abnormal) C    TESTS:  _ GS    DATE/TIME CALLED: _ 2022 07:31:24    CALLED TO: Tony Ren RN    READ BACK (2 Patient Identifiers)(Y/N): _ Y    READ BACK VALUES (Y/N): _ Y    CALLED BY: Tony Metz  Organism: Blood Culture PCR (26 May 2022 08:04)  Organism: Blood Culture PCR (26 May 2022 08:04)    MEDICATIONS  (STANDING):  acetaminophen     Tablet .. 650 milliGRAM(s) Oral every 6 hours  aspirin enteric coated 81 milliGRAM(s) Oral daily  dextrose 5% 1000 milliLiter(s) (125 mL/Hr) IV Continuous <Continuous>  dorzolamide 2%/timolol 0.5% Ophthalmic Solution 1 Drop(s) Both EYES two times a day  gabapentin Oral Tab/Cap - Peds 300 milliGRAM(s) Oral daily  simvastatin 40 milliGRAM(s) Oral at bedtime  tamsulosin 0.8 milliGRAM(s) Oral at bedtime      ASSESSMENT / PLAN:  ----------------------------------------  89M with a history of hypertension, diabetes, gastric mass, and GERD who presented with a three day history of confusion and lethargy. He was found to have urine retention with acute kidney injury and a Kaiser catheter was inserted with drainage of a large volume of urine.    Toxic metabolic encephalopathy - Speech Pathology evaluation noted, to avoid oral intake for now. On intravenous fluids.    Acute kidney injury - Kaiser catheter in place, monitoring output. Urinalysis was not indicative of infection. Nephrology consultation noted. To continue on intravenous fluids. Hyperkalemia improved on repeat studies to continue monitoring. Lisinopril-HCTZ was held on admission. For initiation of tamsulosin if able to tolerate oral intake.    Diabetes - Insulin coverage, close monitoring of blood glucose levels. Basaglar to be held as the patient was note to have hypoglycemia.    Hypertension - Close blood pressure monitoring.    Glaucoma - Continue with eye drops.    Neuropathy - For resumption of gabapentin if able to tolerate oral intake.    Overall prognosis guarded / poor. JOHNATHON SEVERINO  ----------------------------------------  The patient was seen at bedside. Patient with urine retention and acute kidney injury. Somnolent but awakened with voice. Appears in no distress.    Vital Signs Last 24 Hrs  T(C): 36.3 (26 May 2022 07:43), Max: 36.9 (25 May 2022 14:00)  T(F): 97.4 (26 May 2022 07:43), Max: 98.4 (25 May 2022 14:00)  HR: 70 (26 May 2022 07:43) (67 - 129)  BP: 98/60 (26 May 2022 07:43) (98/60 - 168/82)  BP(mean): --  RR: 16 (26 May 2022 07:43) (16 - 22)  SpO2: 97% (26 May 2022 07:43) (92% - 100%)    CAPILLARY BLOOD GLUCOSE  POCT Blood Glucose.: 107 mg/dL (26 May 2022 06:03)  POCT Blood Glucose.: 184 mg/dL (25 May 2022 17:41)  POCT Blood Glucose.: 74 mg/dL (25 May 2022 14:03)    PHYSICAL EXAMINATION:  ----------------------------------------  General appearance: No acute distress, Awake, Alert  HEENT: Normocephalic, Atraumatic, Conjunctiva clear, EOMI  Neck: Supple, No JVD, No tenderness  Lungs: Breath sound equal bilaterally, No wheezes, No rales  Cardiovascular: S1S2, Regular rhythm  Abdomen: Soft, Nontender, Nondistended, No guarding/rebound, Positive bowel sounds  Extremities: No clubbing, No cyanosis, No edema, No calf tenderness, Chronic skin changes on the lower extremities  Neuro: Strength equal bilaterally, No tremors  Psychiatric: Appropriate mood, Normal affect    LABORATORY STUDIES:  ----------------------------------------             11.6   9.97  )-----------( 313      ( 25 May 2022 11:16 )             34.9         135  |  101  |  126.8<H>  ----------------------------<  65<L>  6.2<HH>   |  20.0<L>  |  7.71<H>    Ca    10.3<H>      25 May 2022 14:21    TPro  6.7  /  Alb  3.2<L>  /  TBili  0.2<L>  /  DBili  x   /  AST  9   /  ALT  12  /  AlkPhos  59  -    LIVER FUNCTIONS - ( 25 May 2022 14:21 )  Alb: 3.2 g/dL / Pro: 6.7 g/dL / ALK PHOS: 59 U/L / ALT: 12 U/L / AST: 9 U/L / GGT: x           PT/INR - ( 25 May 2022 11:16 )   PT: 12.5 sec;   INR: 1.08 ratio    PTT - ( 25 May 2022 11:16 )  PTT:29.5 sec    Urinalysis Basic - ( 25 May 2022 11:17 )  Color: Yellow / Appearance: Clear / S.010 / pH: x  Gluc: x / Ketone: Negative  / Bili: Negative / Urobili: Negative mg/dL   Blood: x / Protein: 15 / Nitrite: Negative   Leuk Esterase: Trace / RBC: 25-50 /HPF / WBC 6-10 /HPF   Sq Epi: x / Non Sq Epi: Few / Bacteria: Few    Culture - Blood (collected 25 May 2022 11:13)  Source: .Blood Blood-Peripheral  Gram Stain (26 May 2022 07:33):    Growth in anaerobic bottle: Gram Positive Cocci in Clusters    ***Blood Panel PCR results on this specimen are available    approximately 3 hours after the Gram stain result.***    Gram stain, PCR, and/or culture results may not always    correspond due todifference in methodologies.    ************************************************************    This PCR assay was performed using [employer].    The following targets are tested for: Enterococcus,    vancomycin resistant enterococci, Listeria monocytogenes,    coagulase negative staphylococci, S. aureus,    methicillin resistant S. aureus, Streptococcus agalactiae    (Group B), S. pneumoniae, S. pyogenes (Group A),    Acinetobacter baumannii, Enterobacter cloacae, E. coli,    Klebsiella oxytoca, K. pneumoniae, Proteus sp.,    Serratia marcescens, Haemophilus influenzae,    Neisseria meningitidis, Pseudomonas aeruginosa, Candida    albicans, C. glabrata, C krusei, C parapsilosis,    C. tropicalis and the KPC resistance gene.    Gram Stain and BCID performed by:    [facility]    [address] [place] [id number]    .    TYPE: (C=Critical, N=Notification, A=Abnormal) C    TESTS:  _ GS    DATE/TIME CALLED: _ 2022 07:31:24    CALLED TO: Tony Ren RN    READ BACK (2 Patient Identifiers)(Y/N): _ Y    READ BACK VALUES (Y/N): _ Y    CALLED BY: Tony Metz  Organism: Blood Culture PCR (26 May 2022 08:04)  Organism: Blood Culture PCR (26 May 2022 08:04)    MEDICATIONS  (STANDING):  acetaminophen     Tablet .. 650 milliGRAM(s) Oral every 6 hours  aspirin enteric coated 81 milliGRAM(s) Oral daily  dextrose 5% 1000 milliLiter(s) (125 mL/Hr) IV Continuous <Continuous>  dorzolamide 2%/timolol 0.5% Ophthalmic Solution 1 Drop(s) Both EYES two times a day  gabapentin Oral Tab/Cap - Peds 300 milliGRAM(s) Oral daily  simvastatin 40 milliGRAM(s) Oral at bedtime  tamsulosin 0.8 milliGRAM(s) Oral at bedtime      ASSESSMENT / PLAN:  ----------------------------------------  89M with a history of hypertension, diabetes, gastric mass, and GERD who presented with a three day history of confusion and lethargy. He was found to have urine retention with acute kidney injury and a Kaiser catheter was inserted with drainage of a large volume of urine.    Toxic metabolic encephalopathy - Speech Pathology evaluation noted, to avoid oral intake for now. On intravenous fluids.    Acute kidney injury - Kaiser catheter in place, monitoring output. Urinalysis was not indicative of infection. Nephrology consultation noted. To continue on intravenous fluids. Hyperkalemia improved on repeat studies to continue monitoring. Lisinopril-HCTZ was held on admission. For initiation of tamsulosin if able to tolerate oral intake.    Bacteremia - Blood culture noted coagulase negative Staph suspected to be a contaminant. The remainder of the culture results to be reviewed when available.    Diabetes - Insulin coverage, close monitoring of blood glucose levels. Basaglar to be held as the patient was note to have hypoglycemia.    Hypertension - Close blood pressure monitoring.    Glaucoma - Continue with eye drops.    Neuropathy - For resumption of gabapentin if able to tolerate oral intake.    Overall prognosis guarded / poor. JOHNATHON SEVERINO  ----------------------------------------  The patient was seen at bedside. Patient with urine retention and acute kidney injury. Somnolent but awakened with voice. Appears in no distress.    Vital Signs Last 24 Hrs  T(C): 36.3 (26 May 2022 07:43), Max: 36.9 (25 May 2022 14:00)  T(F): 97.4 (26 May 2022 07:43), Max: 98.4 (25 May 2022 14:00)  HR: 70 (26 May 2022 07:43) (67 - 129)  BP: 98/60 (26 May 2022 07:43) (98/60 - 168/82)  BP(mean): --  RR: 16 (26 May 2022 07:43) (16 - 22)  SpO2: 97% (26 May 2022 07:43) (92% - 100%)    CAPILLARY BLOOD GLUCOSE  POCT Blood Glucose.: 107 mg/dL (26 May 2022 06:03)  POCT Blood Glucose.: 184 mg/dL (25 May 2022 17:41)  POCT Blood Glucose.: 74 mg/dL (25 May 2022 14:03)    PHYSICAL EXAMINATION:  ----------------------------------------  General appearance: No acute distress, Lethargic  HEENT: Normocephalic, Atraumatic, Conjunctiva clear, EOMI  Neck: Supple, No JVD, No tenderness  Lungs: Breath sound equal bilaterally, No wheezes, No rales  Cardiovascular: S1S2, Regular rhythm  Abdomen: Soft, Nontender, Nondistended, No guarding/rebound, Positive bowel sounds  Extremities: No clubbing, No cyanosis, No edema, No calf tenderness, Chronic skin changes on the lower extremities  Neuro: Strength equal bilaterally, No tremors  Psychiatric: Lethargic    LABORATORY STUDIES:  ----------------------------------------             11.6   9.97  )-----------( 313      ( 25 May 2022 11:16 )             34.9         135  |  101  |  126.8<H>  ----------------------------<  65<L>  6.2<HH>   |  20.0<L>  |  7.71<H>    Ca    10.3<H>      25 May 2022 14:21    TPro  6.7  /  Alb  3.2<L>  /  TBili  0.2<L>  /  DBili  x   /  AST  9   /  ALT  12  /  AlkPhos  59      LIVER FUNCTIONS - ( 25 May 2022 14:21 )  Alb: 3.2 g/dL / Pro: 6.7 g/dL / ALK PHOS: 59 U/L / ALT: 12 U/L / AST: 9 U/L / GGT: x           PT/INR - ( 25 May 2022 11:16 )   PT: 12.5 sec;   INR: 1.08 ratio    PTT - ( 25 May 2022 11:16 )  PTT:29.5 sec    Urinalysis Basic - ( 25 May 2022 11:17 )  Color: Yellow / Appearance: Clear / S.010 / pH: x  Gluc: x / Ketone: Negative  / Bili: Negative / Urobili: Negative mg/dL   Blood: x / Protein: 15 / Nitrite: Negative   Leuk Esterase: Trace / RBC: 25-50 /HPF / WBC 6-10 /HPF   Sq Epi: x / Non Sq Epi: Few / Bacteria: Few    Culture - Blood (collected 25 May 2022 11:13)  Source: .Blood Blood-Peripheral  Gram Stain (26 May 2022 07:33):    Growth in anaerobic bottle: Gram Positive Cocci in Clusters    ***Blood Panel PCR results on this specimen are available    approximately 3 hours after the Gram stain result.***    Gram stain, PCR, and/or culture results may not always    correspond due todifference in methodologies.    ************************************************************    This PCR assay was performed using [employer].    The following targets are tested for: Enterococcus,    vancomycin resistant enterococci, Listeria monocytogenes,    coagulase negative staphylococci, S. aureus,    methicillin resistant S. aureus, Streptococcus agalactiae    (Group B), S. pneumoniae, S. pyogenes (Group A),    Acinetobacter baumannii, Enterobacter cloacae, E. coli,    Klebsiella oxytoca, K. pneumoniae, Proteus sp.,    Serratia marcescens, Haemophilus influenzae,    Neisseria meningitidis, Pseudomonas aeruginosa, Candida    albicans, C. glabrata, C krusei, C parapsilosis,    C. tropicalis and the KPC resistance gene.    Gram Stain and BCID performed by:    [facility]    [address] [place] [id number]    .    TYPE: (C=Critical, N=Notification, A=Abnormal) C    TESTS:  _ GS    DATE/TIME CALLED: _ 2022 07:31:24    CALLED TO: Tony Ren RN    READ BACK (2 Patient Identifiers)(Y/N): _ Y    READ BACK VALUES (Y/N): _ Y    CALLED BY: Tony Metz  Organism: Blood Culture PCR (26 May 2022 08:04)  Organism: Blood Culture PCR (26 May 2022 08:04)    MEDICATIONS  (STANDING):  acetaminophen     Tablet .. 650 milliGRAM(s) Oral every 6 hours  aspirin enteric coated 81 milliGRAM(s) Oral daily  dextrose 5% 1000 milliLiter(s) (125 mL/Hr) IV Continuous <Continuous>  dorzolamide 2%/timolol 0.5% Ophthalmic Solution 1 Drop(s) Both EYES two times a day  gabapentin Oral Tab/Cap - Peds 300 milliGRAM(s) Oral daily  simvastatin 40 milliGRAM(s) Oral at bedtime  tamsulosin 0.8 milliGRAM(s) Oral at bedtime      ASSESSMENT / PLAN:  ----------------------------------------  89M with a history of hypertension, diabetes, gastric mass, and GERD who presented with a three day history of confusion and lethargy. He was found to have urine retention with acute kidney injury and a Kaiser catheter was inserted with drainage of a large volume of urine.    Toxic metabolic encephalopathy - Speech Pathology evaluation noted, to avoid oral intake for now. On intravenous fluids.    Acute kidney injury - Kaiser catheter in place, monitoring output. Urinalysis was not indicative of infection. Nephrology consultation noted. To continue on intravenous fluids. Hyperkalemia improved on repeat studies to continue monitoring. Lisinopril-HCTZ was held on admission. For initiation of tamsulosin if able to tolerate oral intake.    Bacteremia - Blood culture noted coagulase negative Staph suspected to be a contaminant. The remainder of the culture results to be reviewed when available.    Diabetes - Insulin coverage, close monitoring of blood glucose levels. Basaglar to be held as the patient was note to have hypoglycemia.    Hypertension - Close blood pressure monitoring.    Glaucoma - Continue with eye drops.    Neuropathy - For resumption of gabapentin if able to tolerate oral intake.    Overall prognosis guarded / poor.

## 2022-05-26 NOTE — SWALLOW BEDSIDE ASSESSMENT ADULT - COMMENTS
Pt accepted ~4 small, controlled cup sips of thin and demonstrated overt s/s aspiration evidenced via significant, substantial cough and increased WOB post-swallow. Further PO trials contraindicated. Pt with acceptance of 1x trial, encouragement for further trials given, however, received with poor benefit

## 2022-05-26 NOTE — SWALLOW BEDSIDE ASSESSMENT ADULT - SLP PERTINENT HISTORY OF CURRENT PROBLEM
As per MD note, "89 year old creole speaking male with medical hx significant for IDDM , hypertension, hyperlipidemia, GERD and ARF -is BIBF for lethargy and confusion for 3 days  and in Ed found to have urinary retention- it was missed by family as he is in a diaper and the diapers were getting wet, In ED is found to be in ARF and 2 liter urine drained with the Kaiser catheter."

## 2022-05-26 NOTE — SWALLOW BEDSIDE ASSESSMENT ADULT - SWALLOW EVAL: DIAGNOSIS
Overall swallow profile negatively impacted by reduced cognition and general fatigue. Oral phase dysphagia marked, reduced oral grading, by reduced orientation to bolus reduced mastication ability with chewables, and delayed oral transport with puree and chewables. Oral stage deemed WFL for thin liquid. Initially, pharyngeal stage seemingly functional across all trials, however, at completion of trials pt demonstrated significant, substantial amount of coughing and increased WOB post-swallow. Further PO intake contraindicated at this time. Overall swallow profile negatively impacted by reduced cognition and lethargy. Oral phase dysphagia marked, reduced oral grading, by reduced orientation to bolus reduced mastication ability with chewables, and delayed oral transport with puree and chewables. Oral stage deemed WFL for thin liquid. Initially, pharyngeal stage seemingly functional across all trials, however, at completion of trials pt demonstrated significant, substantial amount of coughing and increased WOB post-swallow. Further PO intake contraindicated at this time.

## 2022-05-27 LAB
A1C WITH ESTIMATED AVERAGE GLUCOSE RESULT: 10.3 % — HIGH (ref 4–5.6)
ALBUMIN SERPL ELPH-MCNC: 3 G/DL — LOW (ref 3.3–5.2)
ALP SERPL-CCNC: 49 U/L — SIGNIFICANT CHANGE UP (ref 40–120)
ALT FLD-CCNC: 10 U/L — SIGNIFICANT CHANGE UP
ANION GAP SERPL CALC-SCNC: 10 MMOL/L — SIGNIFICANT CHANGE UP (ref 5–17)
AST SERPL-CCNC: 7 U/L — SIGNIFICANT CHANGE UP
BILIRUB SERPL-MCNC: 0.3 MG/DL — LOW (ref 0.4–2)
BUN SERPL-MCNC: 84.4 MG/DL — HIGH (ref 8–20)
CALCIUM SERPL-MCNC: 9.8 MG/DL — SIGNIFICANT CHANGE UP (ref 8.6–10.2)
CHLORIDE SERPL-SCNC: 102 MMOL/L — SIGNIFICANT CHANGE UP (ref 98–107)
CO2 SERPL-SCNC: 33 MMOL/L — HIGH (ref 22–29)
CREAT SERPL-MCNC: 4.08 MG/DL — HIGH (ref 0.5–1.3)
CULTURE RESULTS: SIGNIFICANT CHANGE UP
CULTURE RESULTS: SIGNIFICANT CHANGE UP
EGFR: 13 ML/MIN/1.73M2 — LOW
ESTIMATED AVERAGE GLUCOSE: 249 MG/DL — HIGH (ref 68–114)
GLUCOSE BLDC GLUCOMTR-MCNC: 116 MG/DL — HIGH (ref 70–99)
GLUCOSE BLDC GLUCOMTR-MCNC: 238 MG/DL — HIGH (ref 70–99)
GLUCOSE BLDC GLUCOMTR-MCNC: 335 MG/DL — HIGH (ref 70–99)
GLUCOSE BLDC GLUCOMTR-MCNC: 370 MG/DL — HIGH (ref 70–99)
GLUCOSE BLDC GLUCOMTR-MCNC: 82 MG/DL — SIGNIFICANT CHANGE UP (ref 70–99)
GLUCOSE SERPL-MCNC: 328 MG/DL — HIGH (ref 70–99)
HCT VFR BLD CALC: 37 % — LOW (ref 39–50)
HGB BLD-MCNC: 12.1 G/DL — LOW (ref 13–17)
MCHC RBC-ENTMCNC: 28.1 PG — SIGNIFICANT CHANGE UP (ref 27–34)
MCHC RBC-ENTMCNC: 32.7 GM/DL — SIGNIFICANT CHANGE UP (ref 32–36)
MCV RBC AUTO: 85.8 FL — SIGNIFICANT CHANGE UP (ref 80–100)
ORGANISM # SPEC MICROSCOPIC CNT: SIGNIFICANT CHANGE UP
PLATELET # BLD AUTO: 354 K/UL — SIGNIFICANT CHANGE UP (ref 150–400)
POTASSIUM SERPL-MCNC: 4.3 MMOL/L — SIGNIFICANT CHANGE UP (ref 3.5–5.3)
POTASSIUM SERPL-SCNC: 4.3 MMOL/L — SIGNIFICANT CHANGE UP (ref 3.5–5.3)
PROT SERPL-MCNC: 6.3 G/DL — LOW (ref 6.6–8.7)
RBC # BLD: 4.31 M/UL — SIGNIFICANT CHANGE UP (ref 4.2–5.8)
RBC # FLD: 12.7 % — SIGNIFICANT CHANGE UP (ref 10.3–14.5)
SODIUM SERPL-SCNC: 145 MMOL/L — SIGNIFICANT CHANGE UP (ref 135–145)
SPECIMEN SOURCE: SIGNIFICANT CHANGE UP
WBC # BLD: 9.26 K/UL — SIGNIFICANT CHANGE UP (ref 3.8–10.5)
WBC # FLD AUTO: 9.26 K/UL — SIGNIFICANT CHANGE UP (ref 3.8–10.5)

## 2022-05-27 PROCEDURE — 99497 ADVNCD CARE PLAN 30 MIN: CPT | Mod: 25

## 2022-05-27 PROCEDURE — 99233 SBSQ HOSP IP/OBS HIGH 50: CPT

## 2022-05-27 PROCEDURE — 99232 SBSQ HOSP IP/OBS MODERATE 35: CPT

## 2022-05-27 RX ORDER — INSULIN LISPRO 100/ML
VIAL (ML) SUBCUTANEOUS EVERY 6 HOURS
Refills: 0 | Status: DISCONTINUED | OUTPATIENT
Start: 2022-05-27 | End: 2022-06-07

## 2022-05-27 RX ORDER — SODIUM CHLORIDE 9 MG/ML
1000 INJECTION, SOLUTION INTRAVENOUS
Refills: 0 | Status: DISCONTINUED | OUTPATIENT
Start: 2022-05-27 | End: 2022-06-01

## 2022-05-27 RX ORDER — INSULIN LISPRO 100/ML
4 VIAL (ML) SUBCUTANEOUS ONCE
Refills: 0 | Status: COMPLETED | OUTPATIENT
Start: 2022-05-27 | End: 2022-05-27

## 2022-05-27 RX ADMIN — Medication 2: at 12:37

## 2022-05-27 RX ADMIN — Medication 4: at 08:49

## 2022-05-27 RX ADMIN — SODIUM CHLORIDE 100 MILLILITER(S): 9 INJECTION, SOLUTION INTRAVENOUS at 09:18

## 2022-05-27 RX ADMIN — DORZOLAMIDE HYDROCHLORIDE TIMOLOL MALEATE 1 DROP(S): 20; 5 SOLUTION/ DROPS OPHTHALMIC at 17:41

## 2022-05-27 RX ADMIN — Medication 4 UNIT(S): at 06:21

## 2022-05-27 NOTE — PROGRESS NOTE ADULT - SUBJECTIVE AND OBJECTIVE BOX
Palliative Care Followup    OVERNIGHT EVENTS: no acute overnight events      Present Symptoms:   Dyspnea: Unable to obtain due to poor mentation   Nausea/Vomiting: Unable to obtain due to poor mentation   Anxiety:  Unable to obtain due to poor mentation   Depression: Unable to obtain due to poor mentation   Fatigue: Unable to obtain due to poor mentation    Loss of appetite: Unable to obtain due to poor mentation   Constipation: Unable to obtain due to poor mentation     Pain: Appears comfortable            Character-            Duration-            Effect-            Factors-            Frequency-            Location-            Severity-    Pain AD Score:0  [email or web address] (press ctrl + left click to view)    Review of Systems: Reviewed  Unable to obtain due to poor mentation   All others negative      MEDICATIONS  (STANDING):  aspirin enteric coated 81 milliGRAM(s) Oral daily  dextrose 5%. 1000 milliLiter(s) (100 mL/Hr) IV Continuous <Continuous>  dextrose 5%. 1000 milliLiter(s) (50 mL/Hr) IV Continuous <Continuous>  dextrose 50% Injectable 25 Gram(s) IV Push once  dextrose 50% Injectable 12.5 Gram(s) IV Push once  dextrose 50% Injectable 25 Gram(s) IV Push once  dorzolamide 2%/timolol 0.5% Ophthalmic Solution 1 Drop(s) Both EYES two times a day  glucagon  Injectable 1 milliGRAM(s) IntraMuscular once  insulin lispro (ADMELOG) corrective regimen sliding scale   SubCutaneous every 6 hours  sodium chloride 0.45%. 1000 milliLiter(s) (100 mL/Hr) IV Continuous <Continuous>    MEDICATIONS  (PRN):  dextrose Oral Gel 15 Gram(s) Oral once PRN Blood Glucose LESS THAN 70 milliGRAM(s)/deciliter      PHYSICAL EXAM:    Vital Signs Last 24 Hrs  T(C): 36.8 (27 May 2022 09:57), Max: 36.8 (26 May 2022 17:56)  T(F): 98.2 (27 May 2022 09:57), Max: 98.3 (26 May 2022 17:56)  HR: 106 (27 May 2022 09:57) (84 - 106)  BP: 98/61 (27 May 2022 09:57) (98/61 - 125/77)  BP(mean): --  RR: 18 (27 May 2022 09:57) (16 - 18)  SpO2: 100% (27 May 2022 09:57) (96% - 100%)      General: _ lethargic but able to open his eyes with verbal/physical stimuli    HEENT: dry mouth    Lungs: comfortable    CV: normal      GI: incontinent     : incontinent   conley    MSK: weakness  bedbound    Neuro: lethargic , confuesd    Skin: thin frail dry skin      LABS:                          12.1   9.26  )-----------( 354      ( 27 May 2022 09:24 )             37.0     05-27    145  |  102  |  84.4<H>  ----------------------------<  328<H>  4.3   |  33.0<H>  |  4.08<H>    Ca    9.8      27 May 2022 09:23    TPro  6.3<L>  /  Alb  3.0<L>  /  TBili  0.3<L>  /  DBili  x   /  AST  7   /  ALT  10  /  AlkPhos  49  05-27        I&O's Summary    26 May 2022 07:01  -  27 May 2022 07:00  --------------------------------------------------------  IN: 1250 mL / OUT: 1550 mL / NET: -300 mL    RADIOLOGY & ADDITIONAL STUDIES:    CXR 05.25.22  Impression: Clear lungs with no significant cardiopulmonary abnormalities.      In 2019 CT scan of Abd/Pelvis  IMPRESSION:  Uncomplicated cholelithiasis.  Collapsed stomach with incompletely characterized exophytic lesion   measuring 4 x 3.6 cm in the fundal portion. 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 GI consultation and further workup as   indicated. No evidence of small bowel obstruction or extraluminal   collection.   Incompletely characterized sub-cm left adrenal nodule.  Heterogeneously prominent prostate with nodular density near the apex and   bladder protrusion. Mild bladder wall thickening with moderate   distention. This may related to chronic bladder obstruction from enlarged   prostate. Correlate with urinalysis to assess for infection. Consider   direct visualization if there is concern for bladder malignancy.  Too small to characterize bilateral subpleural based nodules for which   nonemergent short-term pulmonary imaging follow-up is advised.    ADVANCE DIRECTIVES/TREATMENT PREFERENCES:  Full Code

## 2022-05-27 NOTE — PROGRESS NOTE ADULT - SUBJECTIVE AND OBJECTIVE BOX
NEPHROLOGY INTERVAL HPI/OVERNIGHT EVENTS:    Examined  Frail/ elderly/ Lethargic  UOP approx 1 Liter past 12 hrs    MEDICATIONS  (STANDING):  aspirin enteric coated 81 milliGRAM(s) Oral daily  dextrose 5%. 1000 milliLiter(s) (100 mL/Hr) IV Continuous <Continuous>  dextrose 5%. 1000 milliLiter(s) (50 mL/Hr) IV Continuous <Continuous>  dextrose 50% Injectable 25 Gram(s) IV Push once  dextrose 50% Injectable 12.5 Gram(s) IV Push once  dextrose 50% Injectable 25 Gram(s) IV Push once  dorzolamide 2%/timolol 0.5% Ophthalmic Solution 1 Drop(s) Both EYES two times a day  glucagon  Injectable 1 milliGRAM(s) IntraMuscular once  insulin lispro (ADMELOG) corrective regimen sliding scale   SubCutaneous every 6 hours  sodium chloride 0.45%. 1000 milliLiter(s) (100 mL/Hr) IV Continuous <Continuous>    MEDICATIONS  (PRN):  dextrose Oral Gel 15 Gram(s) Oral once PRN Blood Glucose LESS THAN 70 milliGRAM(s)/deciliter      Allergies    No Known Allergies    Intolerances        Vital Signs Last 24 Hrs  T(C): 36.8 (27 May 2022 09:57), Max: 36.8 (26 May 2022 17:56)  T(F): 98.2 (27 May 2022 09:57), Max: 98.3 (26 May 2022 17:56)  HR: 106 (27 May 2022 09:57) (84 - 106)  BP: 98/61 (27 May 2022 09:57) (98/61 - 125/77)  BP(mean): --  RR: 18 (27 May 2022 09:57) (16 - 18)  SpO2: 100% (27 May 2022 09:57) (96% - 100%)  Daily     Daily Weight in k.8 (27 May 2022 04:33)    PHYSICAL EXAM:    GENERAL: Frail debilitated  HEAD:  Atraumatic, Normocephalic  NECK: Supple  NERVOUS SYSTEM:  Arousable  CHEST/LUNG: Clear to percussion bilaterally  HEART: Regular rate and rhythm  ABDOMEN: Soft, Nontender, Nondistended; +BS  EXTREMITIES:  + dependent edema    LABS:                        12.1   9.26  )-----------( 354      ( 27 May 2022 09:24 )             37.0     05-    145  |  102  |  84.4<H>  ----------------------------<  328<H>  4.3   |  33.0<H>  |  4.08<H>    Ca    9.8      27 May 2022 09:23    TPro  6.3<L>  /  Alb  3.0<L>  /  TBili  0.3<L>  /  DBili  x   /  AST  7   /  ALT  10  /  AlkPhos  49                  RADIOLOGY & ADDITIONAL TESTS:

## 2022-05-27 NOTE — PROGRESS NOTE ADULT - ASSESSMENT
This is a 89 year old male PMhx IDDM , hypertension, hyperlipidemia, GERD and ARF arrived to [facility] for lethargy and confusion. Found to have urinary retention, 2 L of urine drained in ED. Patient is creole speaking - noted to be confused and lethargic for 3 days at home. Also noted by family that patient's blood sugar was as low as 35 today.  No reports of chest pain, sob, fever, chills, n/v/d/c. Admitted to team of medicine for Acute renal failure- hyperkalemia- hypoglycemia. Called for palliative care consult to discuss GOC as previously was DNR/DNI now Full code.     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  this would be an acceptable hospice dx if patient not pursuing oncologic interventions    Problem/Recommendation 4: Debility  Assist in ADLS  Maintain safety, fall, aspiration precautions    Problem/Recommendation 5: Palliative Care Encounter  Patient appears comfortable, no signs/symptoms of acute distress noted  Patient able to open his eyes to verbal/physical stimuli  Reached out to patient's daughter yesterday to attempt GOC conversation but she was unable to speak  Lisset to come to hospital today - hope to meet with her to discuss overall GOC and approach the topic of code status as patient was previously here at [facility] in 2019 and evaluated by our Palliative care team and at which time MOLST DNR DNI was established  At this time patient still remains Full Code   Palliative to follow    Total Time Spent__42__ minutes  This includes chart review, patient assessment, discussion and collaboration with interdisciplinary team members, ACP planning      Thank you for the opportunity to assist with the care of this patient.   [facility] Palliative Medicine Consult Service [phone].

## 2022-05-27 NOTE — GOALS OF CARE CONVERSATION - ADVANCED CARE PLANNING - CONVERSATION DETAILS
Met with Sunita at bedside (daughters) along with Dr. Patterson  Discussed code status at length. Explained the difference between Do Not Resuscitate/ Do Not Intubate versus CPR and Intubation.   At the end of our conversation decision was made for DNR/DNI - MOLST completed and placed in chart.   For now plan is to continue to medically optimize with goal of returning home with his aides (10 hours a day) and daughters.   Lisset is going to think about home hospice as an option as well    Thank you for the opportunity to assist with the care of this patient.   [facility] Palliative Medicine Consult Service [phone].

## 2022-05-27 NOTE — PROGRESS NOTE ADULT - SUBJECTIVE AND OBJECTIVE BOX
JOHNATHON SEVERINO  ----------------------------------------  The patient was seen at bedside. Patient with urine retention and acute kidney injury. Lethargic, appears in no distress.    Vital Signs Last 24 Hrs  T(C): 36.4 (27 May 2022 06:37), Max: 36.8 (26 May 2022 17:56)  T(F): 97.5 (27 May 2022 06:37), Max: 98.3 (26 May 2022 17:56)  HR: 98 (27 May 2022 06:37) (84 - 102)  BP: 121/69 (27 May 2022 06:37) (104/69 - 125/77)  BP(mean): --  RR: 18 (27 May 2022 06:37) (16 - 18)  SpO2: 99% (27 May 2022 06:37) (96% - 100%)    CAPILLARY BLOOD GLUCOSE  POCT Blood Glucose.: 335 mg/dL (27 May 2022 08:18)  POCT Blood Glucose.: 370 mg/dL (27 May 2022 05:21)  POCT Blood Glucose.: 178 mg/dL (26 May 2022 21:05)  POCT Blood Glucose.: 148 mg/dL (26 May 2022 18:35)  POCT Blood Glucose.: 113 mg/dL (26 May 2022 11:18)    PHYSICAL EXAMINATION:  ----------------------------------------  General appearance: No acute distress, Lethargic  HEENT: Normocephalic, Atraumatic, Conjunctiva clear  Neck: Supple, No JVD, No tenderness  Lungs: Breath sound equal bilaterally, No wheezes, No rales  Cardiovascular: S1S2, Regular rhythm  Abdomen: Soft, Nontender, Nondistended, No guarding/rebound, Positive bowel sounds  Extremities: No clubbing, No cyanosis, No edema, No calf tenderness, Chronic skin changes on the lower extremities  Neuro: Strength equal bilaterally, No tremors  Psychiatric: Lethargic    LABORATORY STUDIES:  ----------------------------------------             12.1   9.26  )-----------( 354      ( 27 May 2022 09:24 )             37.0     05    145  |  102  |  84.4<H>  ----------------------------<  328<H>  4.3   |  33.0<H>  |  4.08<H>    Ca    9.8      27 May 2022 09:23    TPro  6.3<L>  /  Alb  3.0<L>  /  TBili  0.3<L>  /  DBili  x   /  AST  7   /  ALT  10  /  AlkPhos  49      LIVER FUNCTIONS - ( 27 May 2022 09:23 )  Alb: 3.0 g/dL / Pro: 6.3 g/dL / ALK PHOS: 49 U/L / ALT: 10 U/L / AST: 7 U/L / GGT: x           PT/INR - ( 25 May 2022 11:16 )   PT: 12.5 sec;   INR: 1.08 ratio    PTT - ( 25 May 2022 11:16 )  PTT:29.5 sec    Urinalysis Basic - ( 25 May 2022 11:17 )  Color: Yellow / Appearance: Clear / S.010 / pH: x  Gluc: x / Ketone: Negative  / Bili: Negative / Urobili: Negative mg/dL   Blood: x / Protein: 15 / Nitrite: Negative   Leuk Esterase: Trace / RBC: 25-50 /HPF / WBC 6-10 /HPF   Sq Epi: x / Non Sq Epi: Few / Bacteria: Few    Culture - Stool (collected 25 May 2022 15:16)  Source: .Stool Feces  Preliminary Report (26 May 2022 17:47):    No enteric pathogens to date: Final culture pending    Culture - Urine (collected 25 May 2022 15:16)  Source: Clean Catch Clean Catch (Midstream)  Final Report (26 May 2022 13:08):    No growth    Culture - Blood (collected 25 May 2022 11:13)  Source: .Blood Blood-Peripheral  Gram Stain (26 May 2022 07:33):    Growth in anaerobic bottle: Gram Positive Cocci in Clusters    ***Blood Panel PCR results on this specimen are available    approximately 3 hours after the Gram stain result.***    Gram stain, PCR, and/or culture results may not always    correspond due todifference in methodologies.    ************************************************************    This PCR assay was performed using [employer].    The following targets are tested for: Enterococcus,    vancomycin resistant enterococci, Listeria monocytogenes,    coagulase negative staphylococci, S. aureus,    methicillin resistant S. aureus, Streptococcus agalactiae    (Group B), S. pneumoniae, S. pyogenes (Group A),    Acinetobacter baumannii, Enterobacter cloacae, E. coli,    Klebsiella oxytoca, K. pneumoniae, Proteus sp.,    Serratia marcescens, Haemophilus influenzae,    Neisseria meningitidis, Pseudomonas aeruginosa, Candida    albicans, C. glabrata, C krusei, C parapsilosis,    C. tropicalis and the KPC resistance gene.    Gram Stain and BCID performed by:    [facility]    [address] [place] [address]    .    TYPE: (C=Critical, N=Notification, A=Abnormal) C    TESTS:  _ GS    DATE/TIME CALLED: _ 2022 07:31:24    CALLED TO: Tony Ren RN    READ BACK (2 Patient Identifiers)(Y/N): _ Y    READ BACK VALUES (Y/N): _ Y    CALLED BY: Tony Metz  Organism: Blood Culture PCR (26 May 2022 08:04)  Organism: Blood Culture PCR (26 May 2022 08:04)    MEDICATIONS  (STANDING):  aspirin enteric coated 81 milliGRAM(s) Oral daily  dextrose 5%. 1000 milliLiter(s) (100 mL/Hr) IV Continuous <Continuous>  dextrose 5%. 1000 milliLiter(s) (50 mL/Hr) IV Continuous <Continuous>  dextrose 50% Injectable 25 Gram(s) IV Push once  dextrose 50% Injectable 12.5 Gram(s) IV Push once  dextrose 50% Injectable 25 Gram(s) IV Push once  dorzolamide 2%/timolol 0.5% Ophthalmic Solution 1 Drop(s) Both EYES two times a day  glucagon  Injectable 1 milliGRAM(s) IntraMuscular once  insulin lispro (ADMELOG) corrective regimen sliding scale   SubCutaneous every 6 hours  sodium chloride 0.45%. 1000 milliLiter(s) (100 mL/Hr) IV Continuous <Continuous>    MEDICATIONS  (PRN):  dextrose Oral Gel 15 Gram(s) Oral once PRN Blood Glucose LESS THAN 70 milliGRAM(s)/deciliter      ASSESSMENT / PLAN:  ----------------------------------------   89M with a history of hypertension, diabetes, gastric mass, and GERD who presented with a three day history of confusion and lethargy. He was found to have urine retention with acute kidney injury and a Kaiser catheter was inserted with drainage of a large volume of urine.    Toxic metabolic encephalopathy - Speech Pathology evaluation noted, to avoid oral intake for now. On intravenous fluids for hydration.    Acute kidney injury - Kaiser catheter previously inserted with drainage of a large amount of urine. Urinalysis was not indicative of infection and urine culture was without growth. Hyperkalemia resolved on repeat studies. Lisinopril-HCTZ was held on admission. For initiation of tamsulosin if the patient is able to tolerate oral intake.    Bacteremia - Blood culture noted coagulase negative Staph suspected to be a contaminant. Final blood culture result to be reviewed when available.    Diabetes - Insulin coverage, close monitoring of blood glucose levels. Basaglar was held as the patient was noted to have acute kidney injury and hypoglycemia. Now with hyperglycemia, to discontinue dextrose infusion.    Hypertension - Close blood pressure monitoring.    Glaucoma - Continue with eye drops.    Neuropathy - For resumption of gabapentin if the patient is able to tolerate oral intake.    Overall prognosis guarded / poor. Palliative Care following. The patient was noted to have DNR/DNI orders on the prior hospitalization. JOHNTAHON SEVERINO  ----------------------------------------  The patient was seen at bedside. Patient with urine retention and acute kidney injury. Lethargic, appears in no distress.    Vital Signs Last 24 Hrs  T(C): 36.4 (27 May 2022 06:37), Max: 36.8 (26 May 2022 17:56)  T(F): 97.5 (27 May 2022 06:37), Max: 98.3 (26 May 2022 17:56)  HR: 98 (27 May 2022 06:37) (84 - 102)  BP: 121/69 (27 May 2022 06:37) (104/69 - 125/77)  BP(mean): --  RR: 18 (27 May 2022 06:37) (16 - 18)  SpO2: 99% (27 May 2022 06:37) (96% - 100%)    CAPILLARY BLOOD GLUCOSE  POCT Blood Glucose.: 335 mg/dL (27 May 2022 08:18)  POCT Blood Glucose.: 370 mg/dL (27 May 2022 05:21)  POCT Blood Glucose.: 178 mg/dL (26 May 2022 21:05)  POCT Blood Glucose.: 148 mg/dL (26 May 2022 18:35)  POCT Blood Glucose.: 113 mg/dL (26 May 2022 11:18)    PHYSICAL EXAMINATION:  ----------------------------------------  General appearance: No acute distress, Lethargic  HEENT: Normocephalic, Atraumatic, Conjunctiva clear  Neck: Supple, No JVD, No tenderness  Lungs: Breath sound equal bilaterally, No wheezes, No rales  Cardiovascular: S1S2, Regular rhythm  Abdomen: Soft, Nontender, Nondistended, No guarding/rebound, Positive bowel sounds  Extremities: No clubbing, No cyanosis, No edema, No calf tenderness, Chronic skin changes on the lower extremities  Neuro: Strength equal bilaterally, No tremors  Psychiatric: Lethargic    LABORATORY STUDIES:  ----------------------------------------             12.1   9.26  )-----------( 354      ( 27 May 2022 09:24 )             37.0     05    145  |  102  |  84.4<H>  ----------------------------<  328<H>  4.3   |  33.0<H>  |  4.08<H>    Ca    9.8      27 May 2022 09:23    TPro  6.3<L>  /  Alb  3.0<L>  /  TBili  0.3<L>  /  DBili  x   /  AST  7   /  ALT  10  /  AlkPhos  49      LIVER FUNCTIONS - ( 27 May 2022 09:23 )  Alb: 3.0 g/dL / Pro: 6.3 g/dL / ALK PHOS: 49 U/L / ALT: 10 U/L / AST: 7 U/L / GGT: x           PT/INR - ( 25 May 2022 11:16 )   PT: 12.5 sec;   INR: 1.08 ratio    PTT - ( 25 May 2022 11:16 )  PTT:29.5 sec    Urinalysis Basic - ( 25 May 2022 11:17 )  Color: Yellow / Appearance: Clear / S.010 / pH: x  Gluc: x / Ketone: Negative  / Bili: Negative / Urobili: Negative mg/dL   Blood: x / Protein: 15 / Nitrite: Negative   Leuk Esterase: Trace / RBC: 25-50 /HPF / WBC 6-10 /HPF   Sq Epi: x / Non Sq Epi: Few / Bacteria: Few    Culture - Stool (collected 25 May 2022 15:16)  Source: .Stool Feces  Preliminary Report (26 May 2022 17:47):    No enteric pathogens to date: Final culture pending    Culture - Urine (collected 25 May 2022 15:16)  Source: Clean Catch Clean Catch (Midstream)  Final Report (26 May 2022 13:08):    No growth    Culture - Blood (collected 25 May 2022 11:13)  Source: .Blood Blood-Peripheral  Gram Stain (26 May 2022 07:33):    Growth in anaerobic bottle: Gram Positive Cocci in Clusters    ***Blood Panel PCR results on this specimen are available    approximately 3 hours after the Gram stain result.***    Gram stain, PCR, and/or culture results may not always    correspond due todifference in methodologies.    ************************************************************    This PCR assay was performed using [employer].    The following targets are tested for: Enterococcus,    vancomycin resistant enterococci, Listeria monocytogenes,    coagulase negative staphylococci, S. aureus,    methicillin resistant S. aureus, Streptococcus agalactiae    (Group B), S. pneumoniae, S. pyogenes (Group A),    Acinetobacter baumannii, Enterobacter cloacae, E. coli,    Klebsiella oxytoca, K. pneumoniae, Proteus sp.,    Serratia marcescens, Haemophilus influenzae,    Neisseria meningitidis, Pseudomonas aeruginosa, Candida    albicans, C. glabrata, C krusei, C parapsilosis,    C. tropicalis and the KPC resistance gene.    Gram Stain and BCID performed by:    [facility]    [address] [place] [address]    .    TYPE: (C=Critical, N=Notification, A=Abnormal) C    TESTS:  _ GS    DATE/TIME CALLED: _ 2022 07:31:24    CALLED TO: Tony Ren RN    READ BACK (2 Patient Identifiers)(Y/N): _ Y    READ BACK VALUES (Y/N): _ Y    CALLED BY: Tony Metz  Organism: Blood Culture PCR (26 May 2022 08:04)  Organism: Blood Culture PCR (26 May 2022 08:04)    MEDICATIONS  (STANDING):  aspirin enteric coated 81 milliGRAM(s) Oral daily  dextrose 5%. 1000 milliLiter(s) (100 mL/Hr) IV Continuous <Continuous>  dextrose 5%. 1000 milliLiter(s) (50 mL/Hr) IV Continuous <Continuous>  dextrose 50% Injectable 25 Gram(s) IV Push once  dextrose 50% Injectable 12.5 Gram(s) IV Push once  dextrose 50% Injectable 25 Gram(s) IV Push once  dorzolamide 2%/timolol 0.5% Ophthalmic Solution 1 Drop(s) Both EYES two times a day  glucagon  Injectable 1 milliGRAM(s) IntraMuscular once  insulin lispro (ADMELOG) corrective regimen sliding scale   SubCutaneous every 6 hours  sodium chloride 0.45%. 1000 milliLiter(s) (100 mL/Hr) IV Continuous <Continuous>    MEDICATIONS  (PRN):  dextrose Oral Gel 15 Gram(s) Oral once PRN Blood Glucose LESS THAN 70 milliGRAM(s)/deciliter      ASSESSMENT / PLAN:  ----------------------------------------   89M with a history of hypertension, diabetes, gastric mass, and GERD who presented with a three day history of confusion and lethargy. He was found to have urine retention with acute kidney injury and a Kaiser catheter was inserted with drainage of a large volume of urine.    Toxic metabolic encephalopathy - Speech Pathology evaluation noted, to avoid oral intake for now. On intravenous fluids for hydration.    Acute kidney injury - Kaiser catheter previously inserted with drainage of a large amount of urine. Urinalysis was not indicative of infection and urine culture was without growth. Hyperkalemia resolved on repeat studies. Lisinopril-HCTZ was held on admission. For initiation of tamsulosin if the patient is able to tolerate oral intake. Pending CT of the abdomen.    Bacteremia - Blood culture noted coagulase negative Staph suspected to be a contaminant. Final blood culture result to be reviewed when available.    Diabetes - Insulin coverage, close monitoring of blood glucose levels. Basaglar was held as the patient was noted to have acute kidney injury and hypoglycemia. Now with hyperglycemia, to discontinue dextrose infusion.    Hypertension - Close blood pressure monitoring.    Glaucoma - Continue with eye drops.    Neuropathy - For resumption of gabapentin if the patient is able to tolerate oral intake.    Overall prognosis guarded / poor. Palliative Care following. The patient was noted to have DNR/DNI orders on the prior hospitalization.

## 2022-05-27 NOTE — PROGRESS NOTE ADULT - ASSESSMENT
A) VINCE 2/2 bladder outlet obstruction improving. would cont Kaiser has good UOP      P) No urgent need for dialysis. would cont IV  fluid 1/2 NS. AM labs

## 2022-05-28 LAB
ANION GAP SERPL CALC-SCNC: 12 MMOL/L — SIGNIFICANT CHANGE UP (ref 5–17)
BUN SERPL-MCNC: 71.2 MG/DL — HIGH (ref 8–20)
CALCIUM SERPL-MCNC: 9.9 MG/DL — SIGNIFICANT CHANGE UP (ref 8.6–10.2)
CHLORIDE SERPL-SCNC: 107 MMOL/L — SIGNIFICANT CHANGE UP (ref 98–107)
CO2 SERPL-SCNC: 29 MMOL/L — SIGNIFICANT CHANGE UP (ref 22–29)
CREAT SERPL-MCNC: 3.03 MG/DL — HIGH (ref 0.5–1.3)
EGFR: 18 ML/MIN/1.73M2 — LOW
GLUCOSE BLDC GLUCOMTR-MCNC: 104 MG/DL — HIGH (ref 70–99)
GLUCOSE BLDC GLUCOMTR-MCNC: 118 MG/DL — HIGH (ref 70–99)
GLUCOSE BLDC GLUCOMTR-MCNC: 91 MG/DL — SIGNIFICANT CHANGE UP (ref 70–99)
GLUCOSE BLDC GLUCOMTR-MCNC: 94 MG/DL — SIGNIFICANT CHANGE UP (ref 70–99)
GLUCOSE SERPL-MCNC: 110 MG/DL — HIGH (ref 70–99)
HCT VFR BLD CALC: 39.6 % — SIGNIFICANT CHANGE UP (ref 39–50)
HGB BLD-MCNC: 12.7 G/DL — LOW (ref 13–17)
MCHC RBC-ENTMCNC: 28 PG — SIGNIFICANT CHANGE UP (ref 27–34)
MCHC RBC-ENTMCNC: 32.1 GM/DL — SIGNIFICANT CHANGE UP (ref 32–36)
MCV RBC AUTO: 87.4 FL — SIGNIFICANT CHANGE UP (ref 80–100)
PLATELET # BLD AUTO: 359 K/UL — SIGNIFICANT CHANGE UP (ref 150–400)
POTASSIUM SERPL-MCNC: 4.2 MMOL/L — SIGNIFICANT CHANGE UP (ref 3.5–5.3)
POTASSIUM SERPL-SCNC: 4.2 MMOL/L — SIGNIFICANT CHANGE UP (ref 3.5–5.3)
RBC # BLD: 4.53 M/UL — SIGNIFICANT CHANGE UP (ref 4.2–5.8)
RBC # FLD: 12.7 % — SIGNIFICANT CHANGE UP (ref 10.3–14.5)
SODIUM SERPL-SCNC: 148 MMOL/L — HIGH (ref 135–145)
WBC # BLD: 8.55 K/UL — SIGNIFICANT CHANGE UP (ref 3.8–10.5)
WBC # FLD AUTO: 8.55 K/UL — SIGNIFICANT CHANGE UP (ref 3.8–10.5)

## 2022-05-28 PROCEDURE — 99232 SBSQ HOSP IP/OBS MODERATE 35: CPT

## 2022-05-28 RX ORDER — LIDOCAINE 4 G/100G
1 CREAM TOPICAL DAILY
Refills: 0 | Status: DISCONTINUED | OUTPATIENT
Start: 2022-05-28 | End: 2022-06-07

## 2022-05-28 RX ADMIN — LIDOCAINE 1 PATCH: 4 CREAM TOPICAL at 17:00

## 2022-05-28 RX ADMIN — DORZOLAMIDE HYDROCHLORIDE TIMOLOL MALEATE 1 DROP(S): 20; 5 SOLUTION/ DROPS OPHTHALMIC at 05:43

## 2022-05-28 NOTE — PROGRESS NOTE ADULT - SUBJECTIVE AND OBJECTIVE BOX
JOHNATHON SEVERINO    [id number]    89y      Male    Patient is a 89y old  Male who presents with a chief complaint of Acute renal failure- hyperkalemia- hypoglycemia (28 May 2022 08:12)      INTERVAL HPI/OVERNIGHT EVENTS:    Poor historian, unable to get much info from the patient, as per nursing staff, no over night issues     REVIEW OF SYSTEMS:    Limited       Vital Signs Last 24 Hrs  T(C): 36.1 (28 May 2022 09:50), Max: 36.7 (28 May 2022 05:50)  T(F): 97 (28 May 2022 09:50), Max: 98 (28 May 2022 05:50)  HR: 74 (28 May 2022 09:50) (70 - 104)  BP: 110/77 (28 May 2022 09:50) (110/72 - 138/64)  RR: 18 (28 May 2022 09:50) (17 - 20)  SpO2: 100% (28 May 2022 09:50) (99% - 100%)    PHYSICAL EXAM:    GENERAL: Elderly male looking   HEENT: PERRL, +EOMI  NECK: soft, Supple, No JVD,   CHEST/LUNG: Clear to auscultate bilaterally; No wheezing  HEART: S1S2+, Regular rate and rhythm; No murmurs  ABDOMEN: Soft, Nontender, Nondistended; Bowel sounds present  EXTREMITIES:  2+ Peripheral Pulses, No edema  SKIN: No rashes or lesions  NEURO: AAOX3, no focal deficits, no motor r sensory loss  PSYCH: normal mood      LABS:                        12.7   8.55  )-----------( 359      ( 28 May 2022 07:02 )             39.6     05-28    148<H>  |  107  |  71.2<H>  ----------------------------<  110<H>  4.2   |  29.0  |  3.03<H>    Ca    9.9      28 May 2022 07:02    TPro  6.3<L>  /  Alb  3.0<L>  /  TBili  0.3<L>  /  DBili  x   /  AST  7   /  ALT  10  /  AlkPhos  49  05-27            I&O's Summary    27 May 2022 07:01  -  28 May 2022 07:00  --------------------------------------------------------  IN: 2200 mL / OUT: 3006 mL / NET: -806 mL    28 May 2022 07:01  -  28 May 2022 16:21  --------------------------------------------------------  IN: 0 mL / OUT: 450 mL / NET: -450 mL        MEDICATIONS  (STANDING):  aspirin enteric coated 81 milliGRAM(s) Oral daily  dextrose 5%. 1000 milliLiter(s) (50 mL/Hr) IV Continuous <Continuous>  dextrose 5%. 1000 milliLiter(s) (100 mL/Hr) IV Continuous <Continuous>  dextrose 50% Injectable 25 Gram(s) IV Push once  dextrose 50% Injectable 12.5 Gram(s) IV Push once  dextrose 50% Injectable 25 Gram(s) IV Push once  dorzolamide 2%/timolol 0.5% Ophthalmic Solution 1 Drop(s) Both EYES two times a day  glucagon  Injectable 1 milliGRAM(s) IntraMuscular once  insulin lispro (ADMELOG) corrective regimen sliding scale   SubCutaneous every 6 hours  lidocaine   4% Patch 1 Patch Transdermal daily  sodium chloride 0.45%. 1000 milliLiter(s) (100 mL/Hr) IV Continuous <Continuous>    MEDICATIONS  (PRN):  dextrose Oral Gel 15 Gram(s) Oral once PRN Blood Glucose LESS THAN 70 milliGRAM(s)/deciliter         JOHNATHON SEVERINO    [id number]    89y      Male    Patient is a 89y old  Male who presents with a chief complaint of Acute renal failure- hyperkalemia- hypoglycemia (28 May 2022 08:12)      INTERVAL HPI/OVERNIGHT EVENTS:    Poor historian, unable to get much info from the patient, as per nursing staff, no over night issues     REVIEW OF SYSTEMS:    Limited       Vital Signs Last 24 Hrs  T(C): 36.1 (28 May 2022 09:50), Max: 36.7 (28 May 2022 05:50)  T(F): 97 (28 May 2022 09:50), Max: 98 (28 May 2022 05:50)  HR: 74 (28 May 2022 09:50) (70 - 104)  BP: 110/77 (28 May 2022 09:50) (110/72 - 138/64)  RR: 18 (28 May 2022 09:50) (17 - 20)  SpO2: 100% (28 May 2022 09:50) (99% - 100%)    PHYSICAL EXAM:  GENERAL: Elderly male looking comfortable   NECK: soft, Supple, No JVD,   CHEST/LUNG: Clear to auscultate bilaterally; No wheezing  HEART: S1S2+, Regular rate and rhythm; No murmurs  ABDOMEN: Soft, Nontender, Nondistended; Bowel sounds present  EXTREMITIES:  1+ Peripheral Pulses, No edema  SKIN: No rashes or lesions  NEURO: confused, not participating.       LABS:                        12.7   8.55  )-----------( 359      ( 28 May 2022 07:02 )             39.6     05-28    148<H>  |  107  |  71.2<H>  ----------------------------<  110<H>  4.2   |  29.0  |  3.03<H>    Ca    9.9      28 May 2022 07:02    TPro  6.3<L>  /  Alb  3.0<L>  /  TBili  0.3<L>  /  DBili  x   /  AST  7   /  ALT  10  /  AlkPhos  49  05-27            I&O's Summary    27 May 2022 07:01  -  28 May 2022 07:00  --------------------------------------------------------  IN: 2200 mL / OUT: 3006 mL / NET: -806 mL    28 May 2022 07:01  -  28 May 2022 16:21  --------------------------------------------------------  IN: 0 mL / OUT: 450 mL / NET: -450 mL        MEDICATIONS  (STANDING):  aspirin enteric coated 81 milliGRAM(s) Oral daily  dextrose 5%. 1000 milliLiter(s) (50 mL/Hr) IV Continuous <Continuous>  dextrose 5%. 1000 milliLiter(s) (100 mL/Hr) IV Continuous <Continuous>  dextrose 50% Injectable 25 Gram(s) IV Push once  dextrose 50% Injectable 12.5 Gram(s) IV Push once  dextrose 50% Injectable 25 Gram(s) IV Push once  dorzolamide 2%/timolol 0.5% Ophthalmic Solution 1 Drop(s) Both EYES two times a day  glucagon  Injectable 1 milliGRAM(s) IntraMuscular once  insulin lispro (ADMELOG) corrective regimen sliding scale   SubCutaneous every 6 hours  lidocaine   4% Patch 1 Patch Transdermal daily  sodium chloride 0.45%. 1000 milliLiter(s) (100 mL/Hr) IV Continuous <Continuous>    MEDICATIONS  (PRN):  dextrose Oral Gel 15 Gram(s) Oral once PRN Blood Glucose LESS THAN 70 milliGRAM(s)/deciliter

## 2022-05-28 NOTE — PROGRESS NOTE ADULT - ASSESSMENT
A) VINCE 2/2 bladder outlet obstruction improving. would cont Kaiser has good UOP/ Hypernatremia      P) No urgent need for dialysis. would cont IV  fluid 1/2 NS. cont hypotonic IVF. AM labs

## 2022-05-29 LAB
ALBUMIN SERPL ELPH-MCNC: 3.2 G/DL — LOW (ref 3.3–5.2)
ALP SERPL-CCNC: 53 U/L — SIGNIFICANT CHANGE UP (ref 40–120)
ALT FLD-CCNC: 10 U/L — SIGNIFICANT CHANGE UP
ANION GAP SERPL CALC-SCNC: 22 MMOL/L — HIGH (ref 5–17)
AST SERPL-CCNC: 11 U/L — SIGNIFICANT CHANGE UP
BILIRUB SERPL-MCNC: 0.7 MG/DL — SIGNIFICANT CHANGE UP (ref 0.4–2)
BUN SERPL-MCNC: 62.8 MG/DL — HIGH (ref 8–20)
CALCIUM SERPL-MCNC: 9.9 MG/DL — SIGNIFICANT CHANGE UP (ref 8.6–10.2)
CHLORIDE SERPL-SCNC: 106 MMOL/L — SIGNIFICANT CHANGE UP (ref 98–107)
CO2 SERPL-SCNC: 21 MMOL/L — LOW (ref 22–29)
CREAT SERPL-MCNC: 2.35 MG/DL — HIGH (ref 0.5–1.3)
EGFR: 24 ML/MIN/1.73M2 — LOW
GLUCOSE BLDC GLUCOMTR-MCNC: 134 MG/DL — HIGH (ref 70–99)
GLUCOSE BLDC GLUCOMTR-MCNC: 189 MG/DL — HIGH (ref 70–99)
GLUCOSE BLDC GLUCOMTR-MCNC: 212 MG/DL — HIGH (ref 70–99)
GLUCOSE BLDC GLUCOMTR-MCNC: 363 MG/DL — HIGH (ref 70–99)
GLUCOSE BLDC GLUCOMTR-MCNC: 379 MG/DL — HIGH (ref 70–99)
GLUCOSE SERPL-MCNC: 177 MG/DL — HIGH (ref 70–99)
HCT VFR BLD CALC: 37.6 % — LOW (ref 39–50)
HGB BLD-MCNC: 11.8 G/DL — LOW (ref 13–17)
MCHC RBC-ENTMCNC: 28.1 PG — SIGNIFICANT CHANGE UP (ref 27–34)
MCHC RBC-ENTMCNC: 31.4 GM/DL — LOW (ref 32–36)
MCV RBC AUTO: 89.5 FL — SIGNIFICANT CHANGE UP (ref 80–100)
PLATELET # BLD AUTO: 383 K/UL — SIGNIFICANT CHANGE UP (ref 150–400)
POTASSIUM SERPL-MCNC: 4.2 MMOL/L — SIGNIFICANT CHANGE UP (ref 3.5–5.3)
POTASSIUM SERPL-SCNC: 4.2 MMOL/L — SIGNIFICANT CHANGE UP (ref 3.5–5.3)
PROT SERPL-MCNC: 6.9 G/DL — SIGNIFICANT CHANGE UP (ref 6.6–8.7)
RBC # BLD: 4.2 M/UL — SIGNIFICANT CHANGE UP (ref 4.2–5.8)
RBC # FLD: 12.3 % — SIGNIFICANT CHANGE UP (ref 10.3–14.5)
SODIUM SERPL-SCNC: 149 MMOL/L — HIGH (ref 135–145)
WBC # BLD: 9.09 K/UL — SIGNIFICANT CHANGE UP (ref 3.8–10.5)
WBC # FLD AUTO: 9.09 K/UL — SIGNIFICANT CHANGE UP (ref 3.8–10.5)

## 2022-05-29 PROCEDURE — 99232 SBSQ HOSP IP/OBS MODERATE 35: CPT

## 2022-05-29 RX ORDER — METOPROLOL TARTRATE 50 MG
5 TABLET ORAL ONCE
Refills: 0 | Status: COMPLETED | OUTPATIENT
Start: 2022-05-29 | End: 2022-05-29

## 2022-05-29 RX ADMIN — Medication 5 MILLIGRAM(S): at 20:48

## 2022-05-29 RX ADMIN — SODIUM CHLORIDE 100 MILLILITER(S): 9 INJECTION, SOLUTION INTRAVENOUS at 09:11

## 2022-05-29 RX ADMIN — Medication 2: at 12:24

## 2022-05-29 RX ADMIN — Medication 5: at 18:08

## 2022-05-29 NOTE — PROGRESS NOTE ADULT - SUBJECTIVE AND OBJECTIVE BOX
JOHNATHON SEVERINO    [id number]    89y      Male    Patient is a 89y old  Male who presents with a chief complaint of Acute renal failure- hyperkalemia- hypoglycemia (29 May 2022 10:42)      INTERVAL HPI/OVERNIGHT EVENTS:      Poor historian, unable to get much info from the patient, as per nursing staff, his IV came out, could not get an other line, did not get iv fluids last night.     REVIEW OF SYSTEMS:    Limited        Vital Signs Last 24 Hrs  T(C): 36.6 (29 May 2022 10:02), Max: 36.8 (28 May 2022 17:01)  T(F): 97.9 (29 May 2022 10:02), Max: 98.2 (28 May 2022 17:01)  HR: 125 (29 May 2022 10:02) (72 - 125)  BP: 130/89 (29 May 2022 10:02) (108/76 - 130/89)  RR: 17 (29 May 2022 10:02) (17 - 18)  SpO2: 97% (29 May 2022 10:02) (97% - 99%)    PHYSICAL EXAM:    GENERAL: Elderly male looking comfortable   NECK: soft, Supple, No JVD,   CHEST/LUNG: Clear to auscultate bilaterally; No wheezing  HEART: S1S2+, Regular rate and rhythm; No murmurs  ABDOMEN: Soft, Nontender, Nondistended; Bowel sounds present  EXTREMITIES:  1+ Peripheral Pulses, No edema  SKIN: No rashes or lesions  NEURO: confused, not participating.       LABS:                        11.8   9.09  )-----------( 383      ( 29 May 2022 06:13 )             37.6     05-29    149<H>  |  106  |  62.8<H>  ----------------------------<  177<H>  4.2   |  21.0<L>  |  2.35<H>    Ca    9.9      29 May 2022 06:13    TPro  6.9  /  Alb  3.2<L>  /  TBili  0.7  /  DBili  x   /  AST  11  /  ALT  10  /  AlkPhos  53  05-29            I&O's Summary    28 May 2022 07:01  -  29 May 2022 07:00  --------------------------------------------------------  IN: 0 mL / OUT: 450 mL / NET: -450 mL    29 May 2022 07:01  -  29 May 2022 14:55  --------------------------------------------------------  IN: 0 mL / OUT: 1600 mL / NET: -1600 mL        MEDICATIONS  (STANDING):  aspirin enteric coated 81 milliGRAM(s) Oral daily  dextrose 5%. 1000 milliLiter(s) (50 mL/Hr) IV Continuous <Continuous>  dextrose 5%. 1000 milliLiter(s) (100 mL/Hr) IV Continuous <Continuous>  dextrose 50% Injectable 25 Gram(s) IV Push once  dextrose 50% Injectable 12.5 Gram(s) IV Push once  dextrose 50% Injectable 25 Gram(s) IV Push once  dorzolamide 2%/timolol 0.5% Ophthalmic Solution 1 Drop(s) Both EYES two times a day  glucagon  Injectable 1 milliGRAM(s) IntraMuscular once  insulin lispro (ADMELOG) corrective regimen sliding scale   SubCutaneous every 6 hours  lidocaine   4% Patch 1 Patch Transdermal daily  sodium chloride 0.45%. 1000 milliLiter(s) (100 mL/Hr) IV Continuous <Continuous>    MEDICATIONS  (PRN):  dextrose Oral Gel 15 Gram(s) Oral once PRN Blood Glucose LESS THAN 70 milliGRAM(s)/deciliter

## 2022-05-29 NOTE — PROGRESS NOTE ADULT - ASSESSMENT
89M with a history of hypertension, diabetes, gastric mass, and GERD who presented with a three day history of confusion and lethargy. He was found to have urine retention with acute kidney injury and a Kaiser catheter was inserted with drainage of a large volume of urine.    Toxic metabolic encephalopathy - Speech Pathology evaluation noted, to avoid oral intake for now. On intravenous fluids for hydration, will further follow with recommendations, will continue with IV fluids, family was trying to feed his told about risk of aspiration, will follow speech therapy.        Acute kidney injury - Kaiser catheter previously inserted with drainage of a large amount of urine. Urinalysis was not indicative of infection and urine culture was without growth. Hyperkalemia resolved on repeat studies. Lisinopril-HCTZ was held on admission. For initiation of tamsulosin if the patient is able to tolerate oral intake. will defer CT of the abdomen, creatine has been down from 8 to 2.3, will continue to monitor.     Bacteremia - Blood culture noted coagulase negative Staph suspected to be a contaminant. Final blood culture came negative, one of the bottles.     Diabetes - Insulin coverage, close monitoring of blood glucose levels. Basaglar was held as the patient was noted to have acute kidney injury and hypoglycemia. Now with hyperglycemia, to discontinue dextrose infusion.    Hypertension - Close blood pressure monitoring, meds on on hold.     HLD: will resume statins once ok to take oral meds.       Glaucoma - Continue with eye drops.    Neuropathy - For resumption of gabapentin if the patient is able to tolerate oral intake.    Overall prognosis guarded / poor. Palliative Care following. The patient was noted to have DNR/DNI orders on the prior hospitalization.     DVT prophylaxis: Scds

## 2022-05-29 NOTE — PROGRESS NOTE ADULT - SUBJECTIVE AND OBJECTIVE BOX
NEPHROLOGY INTERVAL HPI/OVERNIGHT EVENTS:    Examined  Frail/ elderly/ Lethargic  has jarvis little UOP    MEDICATIONS  (STANDING):  aspirin enteric coated 81 milliGRAM(s) Oral daily  dextrose 5%. 1000 milliLiter(s) (50 mL/Hr) IV Continuous <Continuous>  dextrose 5%. 1000 milliLiter(s) (100 mL/Hr) IV Continuous <Continuous>  dextrose 50% Injectable 25 Gram(s) IV Push once  dextrose 50% Injectable 12.5 Gram(s) IV Push once  dextrose 50% Injectable 25 Gram(s) IV Push once  dorzolamide 2%/timolol 0.5% Ophthalmic Solution 1 Drop(s) Both EYES two times a day  glucagon  Injectable 1 milliGRAM(s) IntraMuscular once  insulin lispro (ADMELOG) corrective regimen sliding scale   SubCutaneous every 6 hours  lidocaine   4% Patch 1 Patch Transdermal daily  sodium chloride 0.45%. 1000 milliLiter(s) (100 mL/Hr) IV Continuous <Continuous>    MEDICATIONS  (PRN):  dextrose Oral Gel 15 Gram(s) Oral once PRN Blood Glucose LESS THAN 70 milliGRAM(s)/deciliter      Allergies    No Known Allergies    Intolerances        Vital Signs Last 24 Hrs  T(C): 36.6 (29 May 2022 10:02), Max: 36.8 (28 May 2022 17:01)  T(F): 97.9 (29 May 2022 10:02), Max: 98.2 (28 May 2022 17:01)  HR: 125 (29 May 2022 10:02) (72 - 125)  BP: 130/89 (29 May 2022 10:02) (108/76 - 130/89)  BP(mean): --  RR: 17 (29 May 2022 10:02) (17 - 18)  SpO2: 97% (29 May 2022 10:02) (97% - 99%)  Daily     Daily Weight in k.7 (29 May 2022 06:04)    PHYSICAL EXAM:  GENERAL: Frail debilitated  HEAD:  Atraumatic, Normocephalic  NECK: Supple  NERVOUS SYSTEM:  Arousable  CHEST/LUNG: Clear to percussion bilaterally  HEART: Regular rate and rhythm  ABDOMEN: Soft, Nontender, Nondistended; +BS  EXTREMITIES:  + dependent edema  LABS:                        11.8   9.09  )-----------( 383      ( 29 May 2022 06:13 )             37.6         149<H>  |  106  |  62.8<H>  ----------------------------<  177<H>  4.2   |  21.0<L>  |  2.35<H>    Ca    9.9      29 May 2022 06:13    TPro  6.9  /  Alb  3.2<L>  /  TBili  0.7  /  DBili  x   /  AST  11  /  ALT  10  /  AlkPhos  53                  RADIOLOGY & ADDITIONAL TESTS:

## 2022-05-29 NOTE — CHART NOTE - NSCHARTNOTEFT_GEN_A_CORE
89M with a history of hypertension, diabetes, gastric mass, and GERD admitted w/ confusion, lethargy, and VINCE   Called by RN, patient with -140's all day +AFIB   Patient has history of paroxysmal Afib seen on previous admission and EKGs   Patient asymptomatic, history limited 2/2 to current mental status  Not placed on any AC presumably due to advanced age  On maintenance IV fluids at 100cc and patient is NPO   Lopressor 5mg IVP x1 as HR sustaining 140's   /70  BMP, CBC, Mag, Phos, TSH added to AM labs  [place] [facility] consult placed for AM    RN to notify with any acute changes 89M with a history of hypertension, diabetes, gastric mass, and GERD admitted w/ confusion, lethargy, and VINCE   Called by RN, patient with -140's all day +AFIB   Patient has history of paroxysmal Afib seen on previous admission and EKGs   Patient appears comfortable, history limited 2/2 to current mental status  Not placed on any AC presumably due to advanced age  On maintenance IV fluids at 100cc and patient is NPO   Lopressor 5mg IVP x1 as HR sustaining 140's   /70  BMP, CBC, Mag, Phos, TSH added to AM labs  [place] cardio consult placed for AM    RN to notify with any acute changes 89M with a history of hypertension, diabetes, gastric mass, and GERD admitted w/ confusion, lethargy, and VINCE   Called by RN, patient with -140's all day +AFIB   Patient has history of paroxysmal Afib seen on previous admission and EKGs   Patient appears comfortable, history limited 2/2 to current mental status  Not placed on any AC presumably due to advanced age  On maintenance IV fluids at 100cc and patient is NPO   Lopressor 5mg IVP x1 as HR sustaining 140's   /70  BMP, CBC, Mag, Phos, TSH added to AM labs  [place] cardio consult placed for AM      Addendum 4:50   Patient with good response to IV lopressor overnight   This AM HR sustaining over 130's for over 10 minutes    Will order Lopressor 5mg IVP again   RN to notify with any acute changes

## 2022-05-30 LAB
ALBUMIN SERPL ELPH-MCNC: 3.1 G/DL — LOW (ref 3.3–5.2)
ALP SERPL-CCNC: 54 U/L — SIGNIFICANT CHANGE UP (ref 40–120)
ALT FLD-CCNC: 14 U/L — SIGNIFICANT CHANGE UP
ANION GAP SERPL CALC-SCNC: 12 MMOL/L — SIGNIFICANT CHANGE UP (ref 5–17)
AST SERPL-CCNC: 12 U/L — SIGNIFICANT CHANGE UP
BILIRUB SERPL-MCNC: 0.7 MG/DL — SIGNIFICANT CHANGE UP (ref 0.4–2)
BUN SERPL-MCNC: 47.4 MG/DL — HIGH (ref 8–20)
CALCIUM SERPL-MCNC: 9.8 MG/DL — SIGNIFICANT CHANGE UP (ref 8.6–10.2)
CHLORIDE SERPL-SCNC: 109 MMOL/L — HIGH (ref 98–107)
CO2 SERPL-SCNC: 24 MMOL/L — SIGNIFICANT CHANGE UP (ref 22–29)
CREAT SERPL-MCNC: 1.7 MG/DL — HIGH (ref 0.5–1.3)
CULTURE RESULTS: SIGNIFICANT CHANGE UP
EGFR: 36 ML/MIN/1.73M2 — LOW
GLUCOSE BLDC GLUCOMTR-MCNC: 182 MG/DL — HIGH (ref 70–99)
GLUCOSE BLDC GLUCOMTR-MCNC: 208 MG/DL — HIGH (ref 70–99)
GLUCOSE BLDC GLUCOMTR-MCNC: 224 MG/DL — HIGH (ref 70–99)
GLUCOSE BLDC GLUCOMTR-MCNC: 242 MG/DL — HIGH (ref 70–99)
GLUCOSE BLDC GLUCOMTR-MCNC: 328 MG/DL — HIGH (ref 70–99)
GLUCOSE SERPL-MCNC: 221 MG/DL — HIGH (ref 70–99)
HCT VFR BLD CALC: 38.1 % — LOW (ref 39–50)
HGB BLD-MCNC: 11.9 G/DL — LOW (ref 13–17)
MAGNESIUM SERPL-MCNC: 1.8 MG/DL — SIGNIFICANT CHANGE UP (ref 1.8–2.6)
MCHC RBC-ENTMCNC: 27.4 PG — SIGNIFICANT CHANGE UP (ref 27–34)
MCHC RBC-ENTMCNC: 31.2 GM/DL — LOW (ref 32–36)
MCV RBC AUTO: 87.8 FL — SIGNIFICANT CHANGE UP (ref 80–100)
PHOSPHATE SERPL-MCNC: 2.4 MG/DL — SIGNIFICANT CHANGE UP (ref 2.4–4.7)
PLATELET # BLD AUTO: 315 K/UL — SIGNIFICANT CHANGE UP (ref 150–400)
POTASSIUM SERPL-MCNC: 4 MMOL/L — SIGNIFICANT CHANGE UP (ref 3.5–5.3)
POTASSIUM SERPL-SCNC: 4 MMOL/L — SIGNIFICANT CHANGE UP (ref 3.5–5.3)
PROT SERPL-MCNC: 6.5 G/DL — LOW (ref 6.6–8.7)
RBC # BLD: 4.34 M/UL — SIGNIFICANT CHANGE UP (ref 4.2–5.8)
RBC # FLD: 12.2 % — SIGNIFICANT CHANGE UP (ref 10.3–14.5)
SODIUM SERPL-SCNC: 145 MMOL/L — SIGNIFICANT CHANGE UP (ref 135–145)
SPECIMEN SOURCE: SIGNIFICANT CHANGE UP
TSH SERPL-MCNC: 1.2 UIU/ML — SIGNIFICANT CHANGE UP (ref 0.27–4.2)
WBC # BLD: 9.12 K/UL — SIGNIFICANT CHANGE UP (ref 3.8–10.5)
WBC # FLD AUTO: 9.12 K/UL — SIGNIFICANT CHANGE UP (ref 3.8–10.5)

## 2022-05-30 PROCEDURE — 99221 1ST HOSP IP/OBS SF/LOW 40: CPT

## 2022-05-30 PROCEDURE — 93306 TTE W/DOPPLER COMPLETE: CPT | Mod: 26

## 2022-05-30 PROCEDURE — 99232 SBSQ HOSP IP/OBS MODERATE 35: CPT

## 2022-05-30 RX ORDER — METOPROLOL TARTRATE 50 MG
5 TABLET ORAL ONCE
Refills: 0 | Status: COMPLETED | OUTPATIENT
Start: 2022-05-30 | End: 2022-05-30

## 2022-05-30 RX ORDER — DILTIAZEM HCL 120 MG
5 CAPSULE, EXT RELEASE 24 HR ORAL
Qty: 125 | Refills: 0 | Status: DISCONTINUED | OUTPATIENT
Start: 2022-05-30 | End: 2022-05-30

## 2022-05-30 RX ORDER — METOPROLOL TARTRATE 50 MG
5 TABLET ORAL EVERY 6 HOURS
Refills: 0 | Status: DISCONTINUED | OUTPATIENT
Start: 2022-05-30 | End: 2022-06-07

## 2022-05-30 RX ORDER — DILTIAZEM HCL 120 MG
10 CAPSULE, EXT RELEASE 24 HR ORAL
Qty: 125 | Refills: 0 | Status: DISCONTINUED | OUTPATIENT
Start: 2022-05-30 | End: 2022-06-02

## 2022-05-30 RX ORDER — METOPROLOL TARTRATE 50 MG
25 TABLET ORAL EVERY 6 HOURS
Refills: 0 | Status: DISCONTINUED | OUTPATIENT
Start: 2022-05-30 | End: 2022-05-31

## 2022-05-30 RX ADMIN — Medication 5 MILLIGRAM(S): at 22:58

## 2022-05-30 RX ADMIN — DORZOLAMIDE HYDROCHLORIDE TIMOLOL MALEATE 1 DROP(S): 20; 5 SOLUTION/ DROPS OPHTHALMIC at 17:10

## 2022-05-30 RX ADMIN — Medication 25 MILLIGRAM(S): at 17:10

## 2022-05-30 RX ADMIN — Medication 2: at 11:54

## 2022-05-30 RX ADMIN — Medication 4: at 17:09

## 2022-05-30 RX ADMIN — DORZOLAMIDE HYDROCHLORIDE TIMOLOL MALEATE 1 DROP(S): 20; 5 SOLUTION/ DROPS OPHTHALMIC at 05:37

## 2022-05-30 RX ADMIN — Medication 81 MILLIGRAM(S): at 12:52

## 2022-05-30 RX ADMIN — Medication 2: at 05:37

## 2022-05-30 RX ADMIN — Medication 1: at 22:59

## 2022-05-30 RX ADMIN — Medication 25 MILLIGRAM(S): at 12:52

## 2022-05-30 RX ADMIN — Medication 2: at 02:02

## 2022-05-30 RX ADMIN — Medication 5 MILLIGRAM(S): at 05:36

## 2022-05-30 RX ADMIN — Medication 5 MG/HR: at 09:21

## 2022-05-30 NOTE — PROGRESS NOTE ADULT - SUBJECTIVE AND OBJECTIVE BOX
NEPHROLOGY INTERVAL HPI/OVERNIGHT EVENTS:  pt clinically stable  no acute overnight events noted    MEDICATIONS  (STANDING):  aspirin enteric coated 81 milliGRAM(s) Oral daily  dextrose 5%. 1000 milliLiter(s) (50 mL/Hr) IV Continuous <Continuous>  dextrose 5%. 1000 milliLiter(s) (100 mL/Hr) IV Continuous <Continuous>  dextrose 50% Injectable 25 Gram(s) IV Push once  dextrose 50% Injectable 12.5 Gram(s) IV Push once  dextrose 50% Injectable 25 Gram(s) IV Push once  dorzolamide 2%/timolol 0.5% Ophthalmic Solution 1 Drop(s) Both EYES two times a day  glucagon  Injectable 1 milliGRAM(s) IntraMuscular once  insulin lispro (ADMELOG) corrective regimen sliding scale   SubCutaneous every 6 hours  lidocaine   4% Patch 1 Patch Transdermal daily  sodium chloride 0.45%. 1000 milliLiter(s) (100 mL/Hr) IV Continuous <Continuous>    MEDICATIONS  (PRN):  dextrose Oral Gel 15 Gram(s) Oral once PRN Blood Glucose LESS THAN 70 milliGRAM(s)/deciliter      Allergies    No Known Allergies          Vital Signs Last 24 Hrs  T(C): 36.7 (30 May 2022 05:32), Max: 36.8 (29 May 2022 16:13)  T(F): 98.1 (30 May 2022 05:32), Max: 98.3 (29 May 2022 16:13)  HR: 138 (30 May 2022 05:32) (71 - 141)  BP: 125/87 (30 May 2022 05:32) (113/70 - 130/89)  BP(mean): --  RR: 17 (30 May 2022 05:32) (17 - 18)  SpO2: 98% (30 May 2022 05:32) (97% - 99%)    PHYSICAL EXAM:  GENERAL: Weak, fatigued  HEENT:  Dry membranes  NECK: Supple; no JVD  NERVOUS SYSTEM: Drowsy  CHEST/LUNG: Clear bilaterally  HEART: Regular rate and rhythm; no rub  ABDOMEN: Soft, Nontender, +BS  EXTREMITIES:  + dependent edema    LABS:                        11.8   9.09  )-----------( 383      ( 29 May 2022 06:13 )             37.6     05-29    149<H>  |  106  |  62.8<H>  ----------------------------<  177<H>  4.2   |  21.0<L>  |  2.35<H>    Sodium, Serum: 148 mmol/L (05.28.22)  Potassium, Serum: 4.2 mmol/L (05.28.22)  Creatinine, Serum: 3.03 mg/dL (05.28.22)    Ca    9.9      29 May 2022 06:13    TPro  6.9  /  Alb  3.2<L>  /  TBili  0.7  /  DBili  x   /  AST  11  /  ALT  10  /  AlkPhos  53  05-29            RADIOLOGY & ADDITIONAL TESTS:  `

## 2022-05-30 NOTE — PROGRESS NOTE ADULT - ASSESSMENT
VINCE: NICHOLS and hypovolemia  Hypernatremia  - avoid potential nephrotoxins  - cont conley  - adjust IVF as needed  - follow labs

## 2022-05-30 NOTE — CONSULT NOTE ADULT - SUBJECTIVE AND OBJECTIVE BOX
[facility]                                              CARDIOLOGY AT [place] [address]                                             Telephone: [phone]. Fax:[phone]                                                         CARDIOLOGY CONSULTATION NOTE                                                                                             Consult requested by:  Dr. Henderson    History obtained by: Patient and medical record  Community Cardiologist: unknown   obtained: Yes [  ] No [  x]  Reason for Consultation: Atrial fib  Available out pt records reviewed: Yes [  ] No [  x]    History from daughter and chart    89 year old creole speaking male (DNR) with medical hx significant forAtrial fib. IDDM , hypertension, hyperlipidemia, GERD brought in for confusion change in mental status. found to be in ARF and had urinary retention. Job currently in  Arrived in Atrial fib with controlled silvino response.  We were asked to see him for rates up to 150 last night  Patent is unable to participate in history due to confusion        PAST MEDICAL HISTORY  Diabetes  A-fib  Hypertension  Neuropathy    PAST SURGICAL HISTORY  No significant past surgical history    SOCIAL HISTORY:   CIGARETTES:   no  ALCOHOL:  no  DRUGS:  no      FAMILY HISTORY:  not pertinent    HOME MEDICATIONS:        CURRENT MEDICATIONS:     aspirin enteric coated  dextrose 5%.  dextrose 5%.  dextrose 50% Injectable  dextrose 50% Injectable  dextrose 50% Injectable  dorzolamide 2%/timolol 0.5% Ophthalmic Solution  glucagon  Injectable  insulin lispro (ADMELOG) corrective regimen sliding scale  lidocaine   4% Patch  sodium chloride 0.45%.        ALLERGIES: Allergies    No Known Allergies    Intolerances          REVIEW OF SYMPTOMS:   CONSTITUTIONAL: No fever, no chills, no weight loss, no weight gain, no fatigue   ENMT:  No vertigo; No sinus or throat pain  NECK: No pain or stiffness  CARDIOVASCULAR: No chest pain, no dyspnea, no syncope/presyncope, no palpitations, no dizziness, no Orthopnea, no Paroxsymal nocturnal dyspnea  RESPIRATORY: no Shortness of breath, no cough, no wheezing  : No dysuria, no hematuria   GI: No dark color stool, no nausea, no diarrhea, no constipation, no abdominal pain   NEURO: No headache, no slurred speech   MUSCULOSKELETAL: No joint pain or swelling; No muscle, back, or extremity pain  PSYCH: No agitation, no anxiety.    ALL OTHER REVIEW OF SYSTEMS ARE NEGATIVE.      Vital Signs Last 24 Hrs  T(C): 36.7 (30 May 2022 05:32), Max: 36.8 (29 May 2022 16:13)  T(F): 98.1 (30 May 2022 05:32), Max: 98.3 (29 May 2022 16:13)  HR: 138 (30 May 2022 05:32) (71 - 141)  BP: 125/87 (30 May 2022 05:32) (113/70 - 130/89)  BP(mean): --  RR: 17 (30 May 2022 05:32) (17 - 18)  SpO2: 98% (30 May 2022 05:32) (97% - 99%)  INTAKE AND OUTPUT:   05-29 @ 07:01  -  05-30 @ 07:00  --------------------------------------------------------  IN: 1200 mL / OUT: 2850 mL / NET: -1650 mL        PHYSICAL EXAM:  Constitutional: Comfortable . No acute distress.   HEENT: Atraumatic and normocephalic , neck is supple . no JVD. No carotid bruit.  CNS: A&Ox3. No focal deficits.   Respiratory: CTAB, unlabored   Cardiovascular: RRR normal s1 s2. No murmur. No rubs or gallop.  Gastrointestinal: Soft, non-tender. +Bowel sounds.   MSK: full ROM extremities x 4  Extremities: No edema. No cyanosis   Psychiatric: Calm . no agitation.   Skin: Warm and dry, no ulcers on extremities       LABS:                        11.9   9.12  )-----------( 315      ( 30 May 2022 07:24 )             38.1     05-29    149<H>  |  106  |  62.8<H>  ----------------------------<  177<H>  4.2   |  21.0<L>  |  2.35<H>    Ca    9.9      29 May 2022 06:13    TPro  6.9  /  Alb  3.2<L>  /  TBili  0.7  /  DBili  x   /  AST  11  /  ALT  10  /  AlkPhos  53  05-29      ;p-BNP=          INTERPRETATION OF TELEMETRY:     ECG:   Prior ECG: Yes [  ] No [  ]      RADIOLOGY & ADDITIONAL STUDIES:    X-ray:  reviewed by me.   CT scan:   MRI:   US:                                                [facility]                                              CARDIOLOGY AT [place] [address]                                             Telephone: [phone]. Fax:[phone]                                                         CARDIOLOGY CONSULTATION NOTE                                                                                             Consult requested by:  Dr. Henderson    History obtained by: Patient and medical record  Community Cardiologist: unknown   obtained: Yes [  ] No [  x]  Reason for Consultation: Atrial fib  Available out pt records reviewed: Yes [  ] No [  x]    History from daughter and chart    89 year old creole speaking male (DNR) with medical hx significant forAtrial fib. IDDM , hypertension, hyperlipidemia, GERD brought in for confusion change in mental status. found to be in ARF and had urinary retention. Job currently in  Arrived in Atrial fib with controlled silvino response.  We were asked to see him for rates up to 150 last night  Patent is unable to participate in history due to confusion        PAST MEDICAL HISTORY  Diabetes  A-fib  Hypertension  HLD  Neuropathy    PAST SURGICAL HISTORY  No significant past surgical history    SOCIAL HISTORY:   CIGARETTES:   no  ALCOHOL:  no  DRUGS:  no      FAMILY HISTORY:  not pertinent    HOME MEDICATIONS:  	pantoprazole 20 mg oral delayed release tablet: 1 tab(s) orally once a day  · 	acetaminophen 325 mg oral tablet: 2 tab(s) orally every 6 hours, As needed, Moderate Pain (4 - 6)  · 	aspirin 81 mg oral tablet: 1 tab(s) orally once a day  · 	dorzolamide-timolol 2%-0.5% preservative-free ophthalmic solution: 1 drop(s) to each affected eye 2 times a day  · 	gabapentin 100 mg oral capsule:   · 	glimepiride 2 mg oral tablet: 1 tab(s) orally once a day  · 	lisinopril-hydrochlorothiazide 20 mg-12.5 mg oral tablet: 1 tab(s) orally once a day  · 	Basaglar KwikPen 100 units/mL subcutaneous solution:   · 	simvastatin 40 mg oral tablet: 1 tab(s) orally once a day (at bedtime)    CURRENT MEDICATIONS:     aspirin enteric coated  dorzolamide 2%/timolol 0.5% Ophthalmic Solution  glucagon  Injectable  insulin lispro (ADMELOG) corrective regimen sliding scale  lidocaine   4% Patch  sodium chloride 0.45%.    ALLERGIES: Allergies    REVIEW OF SYMPTOMS:   unable to obtain      Vital Signs Last 24 Hrs  T(C): 36.7 (30 May 2022 05:32), Max: 36.8 (29 May 2022 16:13)  T(F): 98.1 (30 May 2022 05:32), Max: 98.3 (29 May 2022 16:13)  HR: 138 (30 May 2022 05:32) (71 - 141)  BP: 125/87 (30 May 2022 05:32) (113/70 - 130/89)  BP(mean): --  RR: 17 (30 May 2022 05:32) (17 - 18)  SpO2: 98% (30 May 2022 05:32) (97% - 99%)  INTAKE AND OUTPUT:   05-29 @ 07:01  -  05-30 @ 07:00  --------------------------------------------------------  IN: 1200 mL / OUT: 2850 mL / NET: -1650 mL    PHYSICAL EXAM:  Constitutional: Comfortable . No acute distress.   HEENT: Atraumatic and normocephalic , neck is supple . no JVD. No carotid bruit.  CNS: A&Ox3. No focal deficits.   Respiratory: CTAB, unlabored   Cardiovascular: RRR normal s1 s2. 1/6 zenia   Gastrointestinal: Soft, non-tender. slight pain in left lower quad  MSK: full ROM extremities x 4  Extremities: No edema. No cyanosis   Psychiatric: Calm . no agitation.   Skin: Warm and dry, no ulcers on extremities       LABS:                        11.9   9.12  )-----------( 315      ( 30 May 2022 07:24 )             38.1     05-29    149<H>  |  106  |  62.8<H>  ----------------------------<  177<H>  4.2   |  21.0<L>  |  2.35<H>    Ca    9.9      29 May 2022 06:13    TPro  6.9  /  Alb  3.2<L>  /  TBili  0.7  /  DBili  x   /  AST  11  /  ALT  10  /  AlkPhos  53  05-29      INTERPRETATION OF TELEMETRY: Atiral fib with rapid silvino rate    ECG:  Atrial fib with lateral t wave inversion       RADIOLOGY & ADDITIONAL STUDIES:   < from: Xray Chest 1 View-PORTABLE IMMEDIATE (05.25.22 @ 12:24) >  Impression: Clear lungs with no significant cardiopulmonary abnormalities.

## 2022-05-30 NOTE — CONSULT NOTE ADULT - ASSESSMENT
89 year old creole speaking male (DNR) with medical hx significant for Atrial fib. IDDM , hypertension, hyperlipidemia, GERD brought in for confusion change in mental status. found to be in ARF and had urinary retention. [facility] currently in  Arrived in Atrial fib with controlled silvino response.  We were asked to see him for rates up to 150 last night  Patent is unable to participate in history due to confusion

## 2022-05-30 NOTE — CONSULT NOTE ADULT - NS ATTEND AMEND GEN_ALL_CORE FT
agree with above,    89M history as listed significant for Advanced dementia, HTN, Afib (not on anticoagulation) admitted with urinary retention/VINCE 133/8.3 and hyperkalemia, noted Afib RVR 150s overnight, unable to take oral meds pending speech/swallow started on IV diltiazem gtt  -HR remains 140s on diltiazem 5mg/hr can uptitrate to 10mg/hr  -pending speech/swallow if able to use oral meds, then prefer metoprolol 25mg q8hrs then change to succinate on discharge  -need discussion of anticoagulation with family given elevated CHADSVasc unclear prior bleeding history   -TTE pending to assess structural heart function/EF  -LE edema R>L likely chronic with lymphedema/bedbound?  -continue to address goals of care, long term prognosis guarded       Yannick Coates DO, [facility]  Faculty Non-Invasive Cardiologist  [phone]

## 2022-05-30 NOTE — PROGRESS NOTE ADULT - SUBJECTIVE AND OBJECTIVE BOX
JOHNATHON SEVERINO    [id number]    89y      Male    Patient is a 89y old  Male who presents with a chief complaint of Acute renal failure- hyperkalemia- hypoglycemia (30 May 2022 07:58)      INTERVAL HPI/OVERNIGHT EVENTS:    Poor historian, unable to get much info from the patient, he has been havinbg afib with RVR      REVIEW OF SYSTEMS:    Limited        Vital Signs Last 24 Hrs  T(C): 36.9 (30 May 2022 10:35), Max: 36.9 (30 May 2022 10:35)  T(F): 98.5 (30 May 2022 10:35), Max: 98.5 (30 May 2022 10:35)  HR: 139 (30 May 2022 11:00) (71 - 141)  BP: 134/96 (30 May 2022 09:25) (113/70 - 134/96)  RR: 16 (30 May 2022 09:25) (16 - 18)  SpO2: 99% (30 May 2022 09:25) (98% - 99%)    PHYSICAL EXAM:    GENERAL: Elderly male looking comfortable   NECK: soft, Supple, No JVD,   CHEST/LUNG: Clear to auscultate bilaterally; No wheezing  HEART: S1S2+, Regular rate and rhythm; No murmurs  ABDOMEN: Soft, Nontender, Nondistended; Bowel sounds present  EXTREMITIES:  1+ Peripheral Pulses, No edema  SKIN: No rashes or lesions  NEURO: confused, not participating.         LABS:                        11.9   9.12  )-----------( 315      ( 30 May 2022 07:24 )             38.1     05-30    145  |  109<H>  |  47.4<H>  ----------------------------<  221<H>  4.0   |  24.0  |  1.70<H>    Ca    9.8      30 May 2022 07:24  Phos  2.4     05-30  Mg     1.8     05-30    TPro  6.5<L>  /  Alb  3.1<L>  /  TBili  0.7  /  DBili  x   /  AST  12  /  ALT  14  /  AlkPhos  54  05-30            I&O's Summary    29 May 2022 07:01  -  30 May 2022 07:00  --------------------------------------------------------  IN: 1200 mL / OUT: 2850 mL / NET: -1650 mL        MEDICATIONS  (STANDING):  aspirin enteric coated 81 milliGRAM(s) Oral daily  dextrose 5%. 1000 milliLiter(s) (50 mL/Hr) IV Continuous <Continuous>  dextrose 5%. 1000 milliLiter(s) (100 mL/Hr) IV Continuous <Continuous>  dextrose 50% Injectable 25 Gram(s) IV Push once  dextrose 50% Injectable 12.5 Gram(s) IV Push once  dextrose 50% Injectable 25 Gram(s) IV Push once  diltiazem Infusion 10 mG/Hr (10 mL/Hr) IV Continuous <Continuous>  dorzolamide 2%/timolol 0.5% Ophthalmic Solution 1 Drop(s) Both EYES two times a day  glucagon  Injectable 1 milliGRAM(s) IntraMuscular once  insulin lispro (ADMELOG) corrective regimen sliding scale   SubCutaneous every 6 hours  lidocaine   4% Patch 1 Patch Transdermal daily  sodium chloride 0.45%. 1000 milliLiter(s) (100 mL/Hr) IV Continuous <Continuous>    MEDICATIONS  (PRN):  dextrose Oral Gel 15 Gram(s) Oral once PRN Blood Glucose LESS THAN 70 milliGRAM(s)/deciliter

## 2022-05-30 NOTE — PROGRESS NOTE ADULT - ASSESSMENT
89M with a history of hypertension, diabetes, gastric mass, and GERD who presented with a three day history of confusion and lethargy. He was found to have urine retention with acute kidney injury and a [facility] catheter was inserted with drainage of a large volume of urine.    Toxic metabolic encephalopathy - Speech Pathology evaluation noted, to avoid oral intake for now. On intravenous fluids for hydration, will further follow with recommendations, will continue with IV fluids, family was trying to feed his told about risk of aspiration, spoke with family and speech therapy team for meeting and try feeding him together as per family he s able to swallow      Acute kidney injury - [facility] catheter previously inserted with drainage of a large amount of urine. Urinalysis was not indicative of infection and urine culture was without growth. Hyperkalemia resolved on repeat studies. Lisinopril-HCTZ was held on admission. For initiation of tamsulosin if the patient is able to tolerate oral intake. will defer CT of the abdomen, creatine has been down from 8 to 1.7, will continue to monitor.     Bacteremia - Blood culture noted coagulase negative Staph suspected to be a contaminant. Final blood culture came negative, one of the bottles.     Diabetes - Insulin coverage, close monitoring of blood glucose levels. Basaglar was held as the patient was noted to have acute kidney injury and hypoglycemia. Now with hyperglycemia, to discontinue dextrose infusion.    Hypertension - Close blood pressure monitoring, meds on on hold.     HLD: will resume statins once ok to take oral meds.     Glaucoma - Continue with eye drops.    Neuropathy - For resumption of gabapentin if the patient is able to tolerate oral intake.    Overall prognosis guarded / poor. Palliative Care following. The patient was noted to have DNR/DNI orders on the prior hospitalization.     DVT prophylaxis: Scds    dispo: likely Home as per family, will see what PT recommands and how he does with speech therapy.          89M with a history of hypertension, diabetes, gastric mass, and GERD who presented with a three day history of confusion and lethargy. He was found to have urine retention with acute kidney injury and a [facility] catheter was inserted with drainage of a large volume of urine.    Toxic metabolic encephalopathy - Speech Pathology evaluation noted, to avoid oral intake for now. On intravenous fluids for hydration, will further follow with recommendations, will continue with IV fluids, family was trying to feed his told about risk of aspiration, spoke with family and speech therapy team for meeting and try feeding him together as per family he s able to swallow      Acute kidney injury - [facility] catheter previously inserted with drainage of a large amount of urine. Urinalysis was not indicative of infection and urine culture was without growth. Hyperkalemia resolved on repeat studies. Lisinopril-HCTZ was held on admission. For initiation of tamsulosin if the patient is able to tolerate oral intake. will defer CT of the abdomen, creatine has been down from 8 to 1.7, will continue to monitor.     Bacteremia - Blood culture noted coagulase negative Staph suspected to be a contaminant. Final blood culture came negative, one of the bottles.     Diabetes - Insulin coverage, close monitoring of blood glucose levels. Basaglar was held as the patient was noted to have acute kidney injury and hypoglycemia. Now with hyperglycemia, to discontinue dextrose infusion.    Hypertension - Close blood pressure monitoring, meds on on hold.     HLD: will resume statins once ok to take oral meds.     Glaucoma - Continue with eye drops.    Neuropathy - For resumption of gabapentin if the patient is able to tolerate oral intake.    Afib with RVR: cardiology is following has been started on cardizam, will monitor.     Overall prognosis guarded / poor. Palliative Care following. The patient was noted to have DNR/DNI orders on the prior hospitalization.     DVT prophylaxis: Scds    dispo: likely Home as per family, will see what PT recommands and how he does with speech therapy.          89M with a history of hypertension, diabetes, gastric mass, and GERD who presented with a three day history of confusion and lethargy. He was found to have urine retention with acute kidney injury and a [facility] catheter was inserted with drainage of a large volume of urine.    Toxic metabolic encephalopathy - Speech Pathology evaluation noted, to avoid oral intake for now. On intravenous fluids for hydration, will further follow with recommendations, will continue with IV fluids, family was trying to feed his told about risk of aspiration, spoke with family and speech therapy team for meeting and try feeding him together as per family he s able to swallow      Acute kidney injury - [facility] catheter previously inserted with drainage of a large amount of urine. Urinalysis was not indicative of infection and urine culture was without growth. Hyperkalemia resolved on repeat studies. Lisinopril-HCTZ was held on admission. For initiation of tamsulosin if the patient is able to tolerate oral intake. will defer CT of the abdomen, creatine has been down from 8 to 1.7, will continue to monitor.     Bacteremia - Blood culture noted coagulase negative Staph suspected to be a contaminant. Final blood culture came negative, one of the bottles.     Diabetes - Insulin coverage, close monitoring of blood glucose levels. Basaglar was held as the patient was noted to have acute kidney injury and hypoglycemia. Now with hyperglycemia, to discontinue dextrose infusion.    Hypertension - Close blood pressure monitoring, meds on on hold.     HLD: will resume statins once ok to take oral meds.     Glaucoma - Continue with eye drops.    Neuropathy - For resumption of gabapentin if the patient is able to tolerate oral intake.    Hx of paroxysmal Afib, now with RVR: cardiology is following has been started on cardizam, will monitor. not on anticoagulation presumably due to advanced age.     Overall prognosis guarded / poor. Palliative Care following. The patient was noted to have DNR/DNI orders on the prior hospitalization.     DVT prophylaxis: Scds    dispo: likely Home as per family, will see what PT recommands and how he does with speech therapy.   spoke with daughter who is RN and updated plan of care, she will come today to meet with Speech therapy team to work on his swallowing.

## 2022-05-31 LAB
ALBUMIN SERPL ELPH-MCNC: 3.6 G/DL — SIGNIFICANT CHANGE UP (ref 3.3–5.2)
ALP SERPL-CCNC: 55 U/L — SIGNIFICANT CHANGE UP (ref 40–120)
ALT FLD-CCNC: 14 U/L — SIGNIFICANT CHANGE UP
ANION GAP SERPL CALC-SCNC: 13 MMOL/L — SIGNIFICANT CHANGE UP (ref 5–17)
AST SERPL-CCNC: 13 U/L — SIGNIFICANT CHANGE UP
BILIRUB SERPL-MCNC: 0.9 MG/DL — SIGNIFICANT CHANGE UP (ref 0.4–2)
BUN SERPL-MCNC: 26.2 MG/DL — HIGH (ref 8–20)
CALCIUM SERPL-MCNC: 9.8 MG/DL — SIGNIFICANT CHANGE UP (ref 8.6–10.2)
CHLORIDE SERPL-SCNC: 103 MMOL/L — SIGNIFICANT CHANGE UP (ref 98–107)
CO2 SERPL-SCNC: 22 MMOL/L — SIGNIFICANT CHANGE UP (ref 22–29)
CREAT SERPL-MCNC: 1.25 MG/DL — SIGNIFICANT CHANGE UP (ref 0.5–1.3)
EGFR: 52 ML/MIN/1.73M2 — LOW
GLUCOSE BLDC GLUCOMTR-MCNC: 215 MG/DL — HIGH (ref 70–99)
GLUCOSE BLDC GLUCOMTR-MCNC: 230 MG/DL — HIGH (ref 70–99)
GLUCOSE BLDC GLUCOMTR-MCNC: 276 MG/DL — HIGH (ref 70–99)
GLUCOSE BLDC GLUCOMTR-MCNC: 331 MG/DL — HIGH (ref 70–99)
GLUCOSE SERPL-MCNC: 239 MG/DL — HIGH (ref 70–99)
HCT VFR BLD CALC: 38.1 % — LOW (ref 39–50)
HGB BLD-MCNC: 12.3 G/DL — LOW (ref 13–17)
MAGNESIUM SERPL-MCNC: 1.7 MG/DL — SIGNIFICANT CHANGE UP (ref 1.6–2.6)
MCHC RBC-ENTMCNC: 28.1 PG — SIGNIFICANT CHANGE UP (ref 27–34)
MCHC RBC-ENTMCNC: 32.3 GM/DL — SIGNIFICANT CHANGE UP (ref 32–36)
MCV RBC AUTO: 87.2 FL — SIGNIFICANT CHANGE UP (ref 80–100)
PHOSPHATE SERPL-MCNC: 1.9 MG/DL — LOW (ref 2.4–4.7)
PLATELET # BLD AUTO: 325 K/UL — SIGNIFICANT CHANGE UP (ref 150–400)
POTASSIUM SERPL-MCNC: 4.4 MMOL/L — SIGNIFICANT CHANGE UP (ref 3.5–5.3)
POTASSIUM SERPL-SCNC: 4.4 MMOL/L — SIGNIFICANT CHANGE UP (ref 3.5–5.3)
PROT SERPL-MCNC: 7.3 G/DL — SIGNIFICANT CHANGE UP (ref 6.6–8.7)
RBC # BLD: 4.37 M/UL — SIGNIFICANT CHANGE UP (ref 4.2–5.8)
RBC # FLD: 12.1 % — SIGNIFICANT CHANGE UP (ref 10.3–14.5)
SODIUM SERPL-SCNC: 138 MMOL/L — SIGNIFICANT CHANGE UP (ref 135–145)
WBC # BLD: 8.99 K/UL — SIGNIFICANT CHANGE UP (ref 3.8–10.5)
WBC # FLD AUTO: 8.99 K/UL — SIGNIFICANT CHANGE UP (ref 3.8–10.5)

## 2022-05-31 PROCEDURE — 99233 SBSQ HOSP IP/OBS HIGH 50: CPT

## 2022-05-31 RX ORDER — RIVAROXABAN 15 MG-20MG
20 KIT ORAL DAILY
Refills: 0 | Status: DISCONTINUED | OUTPATIENT
Start: 2022-05-31 | End: 2022-06-07

## 2022-05-31 RX ORDER — METOPROLOL TARTRATE 50 MG
50 TABLET ORAL EVERY 8 HOURS
Refills: 0 | Status: DISCONTINUED | OUTPATIENT
Start: 2022-05-31 | End: 2022-06-01

## 2022-05-31 RX ADMIN — Medication 2: at 11:57

## 2022-05-31 RX ADMIN — DORZOLAMIDE HYDROCHLORIDE TIMOLOL MALEATE 1 DROP(S): 20; 5 SOLUTION/ DROPS OPHTHALMIC at 17:00

## 2022-05-31 RX ADMIN — DORZOLAMIDE HYDROCHLORIDE TIMOLOL MALEATE 1 DROP(S): 20; 5 SOLUTION/ DROPS OPHTHALMIC at 05:49

## 2022-05-31 RX ADMIN — Medication 25 MILLIGRAM(S): at 08:38

## 2022-05-31 RX ADMIN — Medication 2: at 05:47

## 2022-05-31 RX ADMIN — Medication 5 MILLIGRAM(S): at 05:45

## 2022-05-31 RX ADMIN — Medication 4: at 19:18

## 2022-05-31 RX ADMIN — Medication 50 MILLIGRAM(S): at 21:16

## 2022-05-31 RX ADMIN — Medication 25 MILLIGRAM(S): at 13:06

## 2022-05-31 RX ADMIN — Medication 81 MILLIGRAM(S): at 13:06

## 2022-05-31 NOTE — PROGRESS NOTE ADULT - SUBJECTIVE AND OBJECTIVE BOX
[facility]                                                         CARDIOLOGY AT [facility]                                                                  [address], [place] [place] [address]                                                         Telephone: [phone]. Fax:[phone]                                                                             PROGRESS NOTE    Reason for follow up: AF RVR  Update: confused / combative taking PO food and meds sporidically  *currently not recieving cardizem drip    Review of symptoms: unable to answer   Cardiac:  No chest pain. No dyspnea. No palpitations.  Respiratory: no cough. No dyspnea  Gastrointestinal: No diarrhea. No abdominal pain. No bleeding.   Neuro: No focal neuro complaints.    Vitals:  T(C): 36.8 (05-31-22 @ 07:59), Max: 37.2 (05-30-22 @ 22:49)  HR: 132 (05-31-22 @ 07:59) (72 - 140)  BP: 151/121 (05-31-22 @ 07:59) (108/74 - 151/121)  RR: 18 (05-31-22 @ 07:59) (18 - 18)  SpO2: 100% (05-31-22 @ 07:59) (97% - 100%)  Wt(kg): --  I&O's Summary    30 May 2022 07:01  -  31 May 2022 07:00  --------------------------------------------------------  IN: 2345 mL / OUT: 2300 mL / NET: 45 mL      Weight (kg): 68.8 (05-27 @ 06:37)    PHYSICAL EXAM:  Appearance: Comfortable. No acute distress  HEENT:  Atraumatic. Normocephalic.  Normal oral mucosa  Neurologic: confused combative   Cardiovascular: RRR S1 S2, 130 No murmur, no rubs/gallops. No JVD  Respiratory: Lungs clear to auscultation, unlabored   Gastrointestinal:  Soft, Non-tender, + BS  Lower Extremities: 2+ Peripheral Pulses, No clubbing, cyanosis, or edema  Psychiatry: Patient is calm. No agitation.   Skin: warm and dry.    CURRENT CARDIAC MEDICATIONS:  diltiazem Infusion 10 mG/Hr IV Continuous <Continuous>  metoprolol tartrate 25 milliGRAM(s) Oral every 6 hours  metoprolol tartrate Injectable 5 milliGRAM(s) IV Push every 6 hours PRN      CURRENT OTHER MEDICATIONS:  dextrose 50% Injectable 25 Gram(s) IV Push once, Stop order after: 1 Doses  dextrose 50% Injectable 12.5 Gram(s) IV Push once, Stop order after: 1 Doses  dextrose 50% Injectable 25 Gram(s) IV Push once, Stop order after: 1 Doses  dextrose Oral Gel 15 Gram(s) Oral once, Stop order after: 1 Doses PRN Blood Glucose LESS THAN 70 milliGRAM(s)/deciliter  glucagon  Injectable 1 milliGRAM(s) IntraMuscular once, Stop order after: 1 Doses  insulin lispro (ADMELOG) corrective regimen sliding scale   SubCutaneous every 6 hours  aspirin enteric coated 81 milliGRAM(s) Oral daily  dextrose 5%. 1000 milliLiter(s) (50 mL/Hr) IV Continuous <Continuous>  dextrose 5%. 1000 milliLiter(s) (100 mL/Hr) IV Continuous <Continuous>  dorzolamide 2%/timolol 0.5% Ophthalmic Solution 1 Drop(s) Both EYES two times a day  lidocaine   4% Patch 1 Patch Transdermal daily  sodium chloride 0.45%. 1000 milliLiter(s) (100 mL/Hr) IV Continuous <Continuous>      LABS:	 	                            11.9   9.12  )-----------( 315      ( 30 May 2022 07:24 )             38.1     05-30    145  |  109<H>  |  47.4<H>  ----------------------------<  221<H>  4.0   |  24.0  |  1.70<H>    Ca    9.8      30 May 2022 07:24  Phos  2.4     05-30  Mg     1.8     05-30    TPro  6.5<L>  /  Alb  3.1<L>  /  TBili  0.7  /  DBili  x   /  AST  12  /  ALT  14  /  AlkPhos  54  05-30    PT/INR/PTT ( 25 May 2022 11:16 )                       :                       :      12.5         :       29.5                  .        .                   .              .           .       1.08        .                                       Lipid Profile:   HgA1c:   TSH: Thyroid Stimulating Hormone, Serum: 1.20 uIU/mL      TELEMETRY: / flutter?      DIAGNOSTIC TESTING:  [ ] Echocardiogram:   < from: TTE Echo Complete w/o Contrast w/ Doppler (05.30.22 @ 13:11) >  1. Technically difficult study.   2. Due to rapid Afib overall left ventricular ejection fraction   difficult to accurately assess, but grossly by visual estimation probably   is 50 to 55%.   3. The mitral in-flow pattern reveals no discernable A-wave, therefore   no comment on diastolic function can be made.   4. Normal right ventricular size and function, estimated PASP least 20   mmHg.   5. Mildly enlarged left atrium.   6. Mild mitral annular calcification.   7. Mild mitral valve regurgitation.   8. Mild tricuspid regurgitation.   9. Sclerotic aortic valve with normal opening.  10. There is no evidence of pericardial effusion.

## 2022-05-31 NOTE — PROGRESS NOTE ADULT - NS ATTEND AMEND GEN_ALL_CORE FT
agree with above,    89M history as listed significant for Advanced dementia, HTN, Afib (not on anticoagulation) admitted with urinary retention/VINCE 133/8.3 and hyperkalemia, noted Afib RVR 150s overnight, unable to take oral meds pending speech/swallow started on IV diltiazem gtt  -Afib RVR with gain HR increased to 130-140s restarted on diltiazem 5mg/hr, increase metoprolol 50mg q8hrs  -patient passed swallow study, daughter at bedside fed soft food items, need pills to be crushed, mainly bedbound nonambulatory, reports has 10hrs HHA  -had prior stroke >20yrs ago per his daughter, elevated CHADSVAsc score recommend anticoagulation, no recent bleeding history, start Xarelto 20mg once daily   -TTE grossly preserved LV EF  -LE edema R>L likely chronic with lymphedema/bedbound?  -continue to address goals of care, long term prognosis guarded       Yannick Coates DO, Located within [facility]  Faculty Non-Invasive Cardiologist  [phone].

## 2022-05-31 NOTE — PROGRESS NOTE ADULT - ASSESSMENT
89M with a history of hypertension, diabetes, gastric mass, and GERD who presented with a three day history of confusion and lethargy.       VINCE 2/2 urinary retention  Cr improving  c/w conlye  continue to hold HCTZ-lisinopril  consider tamsulosin if able to take po meds      Toxic metabolic encephalopathy   SLP following- rec soft bite sized diet with mildly thick liquid    A fib  HR uncontrolled  cardiology following. restarted on cardizem gtt  not on AC        Diabetes - Insulin coverage,     Hypertension - Close blood pressure monitoring, meds on on hold.     HLD: will resume statins once ok to take oral meds.     Glaucoma - Continue with eye drops.    Neuropathy - For resumption of gabapentin if the patient is able to tolerate oral intake.      Overall prognosis guarded / poor. Palliative Care following.  DNR/DNI  DVT prophylaxis: Scds

## 2022-05-31 NOTE — PROGRESS NOTE ADULT - ASSESSMENT
VINCE non oliguric  NICHOLS and hypovolemia- better  Hypernatremia- improved  - avoid potential nephrotoxins  - cont conley  -  would cont IV  fluid 1/2 NS  - follow labs    Will follow

## 2022-05-31 NOTE — CHART NOTE - NSCHARTNOTEFT_GEN_A_CORE
Palliative care social work note.    SW contacted patients daughter Lisset to provide support and review family thoughts after meeting last week regarding home hospice. Lisset discussed how patient passed swallow eval with her being present and she wishes at this point to just get patient home since he responds with family care and had CDPAP services at home to assist.

## 2022-05-31 NOTE — DIETITIAN INITIAL EVALUATION ADULT - ETIOLOGY
related to inability to consume sufficient protein energy intake with confusion/lethargy PTA, +dysphagia

## 2022-05-31 NOTE — PROGRESS NOTE ADULT - ASSESSMENT
89 year old creole speaking male (DNR) with medical hx significant forAtrial fib. IDDM , hypertension, hyperlipidemia, GERD brought in for confusion change in mental status. found to be in ARF and had urinary retention. [facility] currently in  Arrived in Atrial fib with controlled silvino response.  We were asked to see him for rates up to 150 last night  Patent is unable to participate in history due to confusion      Problem/Recommendation - 1:  ·  Problem:  Atrial fibrillation. 's maintaining  Per RN Cardizem has not been on since yesterday 5/30/22 pt rate went to 60's for short term   pt is confused and combative at times , difficult to give PO meds   Will resume IV Cardizem at 5mg HR/ can titrate up to 10 as needed    Swallow eval noted   ·  Continue  ASA  will discuss AC but may not consider secondary to  due advanced age and frail state  GOC should be discussed / Palliative care consult noted  D/W Dr. Coates

## 2022-05-31 NOTE — DIETITIAN NUTRITION RISK NOTIFICATION - TREATMENT: THE FOLLOWING DIET HAS BEEN RECOMMENDED
Diet, Soft and Bite Sized (05-30-22 @ 12:32) [Active]  Diet, Soft and Bite Sized (05-30-22 @ 11:55) [Available for Activation]

## 2022-05-31 NOTE — DIETITIAN INITIAL EVALUATION ADULT - PERTINENT MEDS FT
MEDICATIONS  (STANDING):  aspirin enteric coated 81 milliGRAM(s) Oral daily  dextrose 5%. 1000 milliLiter(s) (50 mL/Hr) IV Continuous <Continuous>  dextrose 5%. 1000 milliLiter(s) (100 mL/Hr) IV Continuous <Continuous>  dextrose 50% Injectable 25 Gram(s) IV Push once  dextrose 50% Injectable 12.5 Gram(s) IV Push once  dextrose 50% Injectable 25 Gram(s) IV Push once  diltiazem Infusion 10 mG/Hr (10 mL/Hr) IV Continuous <Continuous>  dorzolamide 2%/timolol 0.5% Ophthalmic Solution 1 Drop(s) Both EYES two times a day  glucagon  Injectable 1 milliGRAM(s) IntraMuscular once  insulin lispro (ADMELOG) corrective regimen sliding scale   SubCutaneous every 6 hours  lidocaine   4% Patch 1 Patch Transdermal daily  metoprolol tartrate 25 milliGRAM(s) Oral every 6 hours  sodium chloride 0.45%. 1000 milliLiter(s) (100 mL/Hr) IV Continuous <Continuous>    MEDICATIONS  (PRN):  dextrose Oral Gel 15 Gram(s) Oral once PRN Blood Glucose LESS THAN 70 milliGRAM(s)/deciliter  metoprolol tartrate Injectable 5 milliGRAM(s) IV Push every 6 hours PRN HR >120

## 2022-05-31 NOTE — PROGRESS NOTE ADULT - SUBJECTIVE AND OBJECTIVE BOX
[facility] Division of Hospital Medicine    Chief Complaint:      SUBJECTIVE / OVERNIGHT EVENTS: unable to answer questions. remains tachycardic        MEDICATIONS  (STANDING):  aspirin enteric coated 81 milliGRAM(s) Oral daily  dextrose 5%. 1000 milliLiter(s) (50 mL/Hr) IV Continuous <Continuous>  dextrose 5%. 1000 milliLiter(s) (100 mL/Hr) IV Continuous <Continuous>  dextrose 50% Injectable 25 Gram(s) IV Push once  dextrose 50% Injectable 12.5 Gram(s) IV Push once  dextrose 50% Injectable 25 Gram(s) IV Push once  diltiazem Infusion 10 mG/Hr (10 mL/Hr) IV Continuous <Continuous>  dorzolamide 2%/timolol 0.5% Ophthalmic Solution 1 Drop(s) Both EYES two times a day  glucagon  Injectable 1 milliGRAM(s) IntraMuscular once  insulin lispro (ADMELOG) corrective regimen sliding scale   SubCutaneous every 6 hours  lidocaine   4% Patch 1 Patch Transdermal daily  metoprolol tartrate 25 milliGRAM(s) Oral every 6 hours  sodium chloride 0.45%. 1000 milliLiter(s) (100 mL/Hr) IV Continuous <Continuous>    MEDICATIONS  (PRN):  dextrose Oral Gel 15 Gram(s) Oral once PRN Blood Glucose LESS THAN 70 milliGRAM(s)/deciliter  metoprolol tartrate Injectable 5 milliGRAM(s) IV Push every 6 hours PRN HR >120        I&O's Summary    30 May 2022 07:01  -  31 May 2022 07:00  --------------------------------------------------------  IN: 2345 mL / OUT: 2300 mL / NET: 45 mL    31 May 2022 07:01  -  31 May 2022 14:09  --------------------------------------------------------  IN: 675 mL / OUT: 0 mL / NET: 675 mL        PHYSICAL EXAM:  Vital Signs Last 24 Hrs  T(C): 36.7 (31 May 2022 13:24), Max: 37.2 (30 May 2022 22:49)  T(F): 98 (31 May 2022 13:24), Max: 98.9 (30 May 2022 22:49)  HR: 134 (31 May 2022 13:24) (84 - 138)  BP: 151/111 (31 May 2022 13:24) (108/74 - 151/121)  BP(mean): --  RR: 18 (31 May 2022 13:24) (18 - 18)  SpO2: 100% (31 May 2022 13:24) (97% - 100%)        CONSTITUTIONAL: NAD,  ENMT: Moist oral mucosa, no pharyngeal injection or exudates; normal dentition  RESPIRATORY: Normal respiratory effort; lungs are clear to auscultation bilaterally  CARDIOVASCULAR: tachycardia normal S1 and S2, no murmur/rub/gallop; No lower extremity edema; Peripheral pulses are 2+ bilaterally  ABDOMEN: Nontender to palpation, normoactive bowel sounds, no rebound/guarding; No hepatosplenomegaly  MUSCLOSKELETAL:  Normal gait; no clubbing or cyanosis of digits; no joint swelling or tenderness to palpation  PSYCH: A+O to person, place, and time; affect appropriate  NEUROLOGY: CN 2-12 are intact and symmetric;   SKIN: No rashes; no palpable lesions    LABS:                        12.3   8.99  )-----------( 325      ( 31 May 2022 13:01 )             38.1     05-31    138  |  103  |  26.2<H>  ----------------------------<  239<H>  4.4   |  22.0  |  1.25    Ca    9.8      31 May 2022 13:01  Phos  1.9     05-31  Mg     1.7     05-31    TPro  7.3  /  Alb  3.6  /  TBili  0.9  /  DBili  x   /  AST  13  /  ALT  14  /  AlkPhos  55  05-31              CAPILLARY BLOOD GLUCOSE      POCT Blood Glucose.: 230 mg/dL (31 May 2022 12:03)  POCT Blood Glucose.: 215 mg/dL (31 May 2022 05:44)  POCT Blood Glucose.: 182 mg/dL (30 May 2022 22:57)  POCT Blood Glucose.: 328 mg/dL (30 May 2022 16:52)        RADIOLOGY & ADDITIONAL TESTS:  Results Reviewed:   Imaging Personally Reviewed:  Electrocardiogram Personally Reviewed:

## 2022-05-31 NOTE — DIETITIAN INITIAL EVALUATION ADULT - PERTINENT LABORATORY DATA
05-30    145  |  109<H>  |  47.4<H>  ----------------------------<  221<H>  4.0   |  24.0  |  1.70<H>    Ca    9.8      30 May 2022 07:24  Phos  2.4     05-30  Mg     1.8     05-30    TPro  6.5<L>  /  Alb  3.1<L>  /  TBili  0.7  /  DBili  x   /  AST  12  /  ALT  14  /  AlkPhos  54  05-30  POCT Blood Glucose.: 215 mg/dL (05-31-22 @ 05:44)  A1C with Estimated Average Glucose Result: 10.3 % (05-27-22 @ 09:23)

## 2022-05-31 NOTE — DIETITIAN INITIAL EVALUATION ADULT - ORAL INTAKE PTA/DIET HISTORY
P currently sleeping; aware poor historian noted.  As per RN, pt with fair po intake at meals and requires full assistance.  Pt currently receiving Soft and Bite Sized diet, however SLP recommendation (5/30) is for Soft and Bite Size with MILDLY thick liquids.  RD to remain available.

## 2022-05-31 NOTE — PROGRESS NOTE ADULT - SUBJECTIVE AND OBJECTIVE BOX
NEPHROLOGY INTERVAL HPI/OVERNIGHT EVENTS:    Examined  Frail/ elderly/ Lethargic  has jarvis little UOP    MEDICATIONS  (STANDING):  aspirin enteric coated 81 milliGRAM(s) Oral daily  dextrose 5%. 1000 milliLiter(s) (50 mL/Hr) IV Continuous <Continuous>  dextrose 5%. 1000 milliLiter(s) (100 mL/Hr) IV Continuous <Continuous>  dextrose 50% Injectable 25 Gram(s) IV Push once  dextrose 50% Injectable 12.5 Gram(s) IV Push once  dextrose 50% Injectable 25 Gram(s) IV Push once  diltiazem Infusion 10 mG/Hr (10 mL/Hr) IV Continuous <Continuous>  dorzolamide 2%/timolol 0.5% Ophthalmic Solution 1 Drop(s) Both EYES two times a day  glucagon  Injectable 1 milliGRAM(s) IntraMuscular once  insulin lispro (ADMELOG) corrective regimen sliding scale   SubCutaneous every 6 hours  lidocaine   4% Patch 1 Patch Transdermal daily  metoprolol tartrate 25 milliGRAM(s) Oral every 6 hours  sodium chloride 0.45%. 1000 milliLiter(s) (100 mL/Hr) IV Continuous <Continuous>    MEDICATIONS  (PRN):  dextrose Oral Gel 15 Gram(s) Oral once PRN Blood Glucose LESS THAN 70 milliGRAM(s)/deciliter  metoprolol tartrate Injectable 5 milliGRAM(s) IV Push every 6 hours PRN HR >120      Allergies    No Known Allergies    Intolerances        Vital Signs Last 24 Hrs  T(C): 36.7 (31 May 2022 13:24), Max: 37.2 (30 May 2022 22:49)  T(F): 98 (31 May 2022 13:24), Max: 98.9 (30 May 2022 22:49)  HR: 134 (31 May 2022 13:24) (72 - 138)  BP: 151/111 (31 May 2022 13:24) (108/74 - 151/121)  BP(mean): --  RR: 18 (31 May 2022 13:24) (18 - 18)  SpO2: 100% (31 May 2022 13:24) (97% - 100%)  Daily     Daily     PHYSICAL EXAM:  GENERAL: Frail debilitated  HEAD:  Atraumatic, Normocephalic  NECK: Supple  NERVOUS SYSTEM:  Arousable confused  CHEST/LUNG: Clear to percussion bilaterally  HEART: Regular rate and rhythm  ABDOMEN: Soft, Nontender, Nondistended; +BS  EXTREMITIES:  + dependent edema    LABS:                        12.3   8.99  )-----------( 325      ( 31 May 2022 13:01 )             38.1     05-31    138  |  103  |  26.2<H>  ----------------------------<  239<H>  4.4   |  22.0  |  1.25    Ca    9.8      31 May 2022 13:01  Phos  1.9     05-31  Mg     1.7     05-31    TPro  7.3  /  Alb  3.6  /  TBili  0.9  /  DBili  x   /  AST  13  /  ALT  14  /  AlkPhos  55  05-31        Magnesium, Serum: 1.7 mg/dL (05-31 @ 13:01)  Phosphorus Level, Serum: 1.9 mg/dL (05-31 @ 13:01)          RADIOLOGY & ADDITIONAL TESTS:

## 2022-06-01 LAB
ANION GAP SERPL CALC-SCNC: 10 MMOL/L — SIGNIFICANT CHANGE UP (ref 5–17)
BUN SERPL-MCNC: 21.5 MG/DL — HIGH (ref 8–20)
CALCIUM SERPL-MCNC: 9.2 MG/DL — SIGNIFICANT CHANGE UP (ref 8.6–10.2)
CHLORIDE SERPL-SCNC: 108 MMOL/L — HIGH (ref 98–107)
CO2 SERPL-SCNC: 21 MMOL/L — LOW (ref 22–29)
CREAT SERPL-MCNC: 1.14 MG/DL — SIGNIFICANT CHANGE UP (ref 0.5–1.3)
EGFR: 58 ML/MIN/1.73M2 — LOW
GLUCOSE BLDC GLUCOMTR-MCNC: 183 MG/DL — HIGH (ref 70–99)
GLUCOSE BLDC GLUCOMTR-MCNC: 210 MG/DL — HIGH (ref 70–99)
GLUCOSE BLDC GLUCOMTR-MCNC: 232 MG/DL — HIGH (ref 70–99)
GLUCOSE BLDC GLUCOMTR-MCNC: 235 MG/DL — HIGH (ref 70–99)
GLUCOSE BLDC GLUCOMTR-MCNC: 244 MG/DL — HIGH (ref 70–99)
GLUCOSE BLDC GLUCOMTR-MCNC: 254 MG/DL — HIGH (ref 70–99)
GLUCOSE SERPL-MCNC: 230 MG/DL — HIGH (ref 70–99)
POTASSIUM SERPL-MCNC: 4 MMOL/L — SIGNIFICANT CHANGE UP (ref 3.5–5.3)
POTASSIUM SERPL-SCNC: 4 MMOL/L — SIGNIFICANT CHANGE UP (ref 3.5–5.3)
SARS-COV-2 RNA SPEC QL NAA+PROBE: SIGNIFICANT CHANGE UP
SODIUM SERPL-SCNC: 139 MMOL/L — SIGNIFICANT CHANGE UP (ref 135–145)

## 2022-06-01 PROCEDURE — 99233 SBSQ HOSP IP/OBS HIGH 50: CPT

## 2022-06-01 RX ORDER — METOPROLOL TARTRATE 50 MG
50 TABLET ORAL EVERY 6 HOURS
Refills: 0 | Status: DISCONTINUED | OUTPATIENT
Start: 2022-06-01 | End: 2022-06-02

## 2022-06-01 RX ORDER — TAMSULOSIN HYDROCHLORIDE 0.4 MG/1
0.4 CAPSULE ORAL AT BEDTIME
Refills: 0 | Status: DISCONTINUED | OUTPATIENT
Start: 2022-06-01 | End: 2022-06-07

## 2022-06-01 RX ORDER — INSULIN GLARGINE 100 [IU]/ML
4 INJECTION, SOLUTION SUBCUTANEOUS AT BEDTIME
Refills: 0 | Status: DISCONTINUED | OUTPATIENT
Start: 2022-06-01 | End: 2022-06-02

## 2022-06-01 RX ORDER — ACETAMINOPHEN 500 MG
650 TABLET ORAL EVERY 6 HOURS
Refills: 0 | Status: DISCONTINUED | OUTPATIENT
Start: 2022-06-01 | End: 2022-06-07

## 2022-06-01 RX ADMIN — Medication 50 MILLIGRAM(S): at 05:55

## 2022-06-01 RX ADMIN — INSULIN GLARGINE 4 UNIT(S): 100 INJECTION, SOLUTION SUBCUTANEOUS at 21:43

## 2022-06-01 RX ADMIN — Medication 650 MILLIGRAM(S): at 13:52

## 2022-06-01 RX ADMIN — RIVAROXABAN 20 MILLIGRAM(S): KIT at 13:52

## 2022-06-01 RX ADMIN — Medication 5 MILLIGRAM(S): at 01:31

## 2022-06-01 RX ADMIN — LIDOCAINE 1 PATCH: 4 CREAM TOPICAL at 21:35

## 2022-06-01 RX ADMIN — LIDOCAINE 1 PATCH: 4 CREAM TOPICAL at 13:51

## 2022-06-01 RX ADMIN — Medication 50 MILLIGRAM(S): at 21:44

## 2022-06-01 RX ADMIN — Medication 1: at 18:30

## 2022-06-01 RX ADMIN — Medication 2: at 21:43

## 2022-06-01 RX ADMIN — Medication 2: at 01:31

## 2022-06-01 RX ADMIN — Medication 50 MILLIGRAM(S): at 13:52

## 2022-06-01 RX ADMIN — DORZOLAMIDE HYDROCHLORIDE TIMOLOL MALEATE 1 DROP(S): 20; 5 SOLUTION/ DROPS OPHTHALMIC at 18:32

## 2022-06-01 RX ADMIN — Medication 2: at 13:50

## 2022-06-01 RX ADMIN — Medication 10 MG/HR: at 20:03

## 2022-06-01 RX ADMIN — DORZOLAMIDE HYDROCHLORIDE TIMOLOL MALEATE 1 DROP(S): 20; 5 SOLUTION/ DROPS OPHTHALMIC at 05:55

## 2022-06-01 RX ADMIN — TAMSULOSIN HYDROCHLORIDE 0.4 MILLIGRAM(S): 0.4 CAPSULE ORAL at 21:44

## 2022-06-01 RX ADMIN — Medication 3: at 05:55

## 2022-06-01 NOTE — PROGRESS NOTE ADULT - SUBJECTIVE AND OBJECTIVE BOX
[place] [facility]                                                               Office: [address] [address]                                                              Telephone: [phone]. Fax:[phone]                                                                             PROGRESS NOTE  Reason for follow up: Afib RVR  Overnight: No new events.   Update: remains in Afib RVR 130s on diltiazem gtt, missed one dose metoprolol yesterday      Review of symptoms:   unable to provide    Past medical history: No updates.   	  Vital Signs Last 24 Hrs  T(C): 36.7 (06-01-22 @ 05:40), Max: 36.8 (05-31-22 @ 16:49)  T(F): 98 (06-01-22 @ 05:40), Max: 98.2 (05-31-22 @ 16:49)  HR: 136 (06-01-22 @ 05:40) (106 - 138)  BP: 132/82 (06-01-22 @ 05:40) (118/83 - 151/111)  BP(mean): --  RR: 18 (06-01-22 @ 10:50) (18 - 18)  SpO2: 99% (06-01-22 @ 10:50) (99% - 100%)  I&O's Summary    31 May 2022 07:01  -  01 Jun 2022 07:00  --------------------------------------------------------  IN: 2040 mL / OUT: 1900 mL / NET: 140 mL          PHYSICAL EXAM:  Appearance: Comfortable. No acute distress  HEENT:  Head and neck: Atraumatic. Normocephalic.  Normal oral mucosa, PERRL, Neck is supple.  Neurologic: A&Ox 1, no focal deficits. EOMI, Cranial nerves are intact.  Lymphatic: No cervical lymphadenopathy  Cardiovascular: Normal S1 S2, No murmur, rubs/gallops. No JVD, No edema  Respiratory: Lungs clear to auscultation  Gastrointestinal:  Soft, Non-tender, + BS  Lower Extremities: No edema  Psychiatry: Patient is calm. No agitation. Mood & affect appropriate  Skin: dry ulcer L-shin      CURRENT MEDICATIONS:  MEDICATIONS  (STANDING):  dextrose 5%. 1000 milliLiter(s) (50 mL/Hr) IV Continuous <Continuous>  dextrose 5%. 1000 milliLiter(s) (100 mL/Hr) IV Continuous <Continuous>  dextrose 50% Injectable 25 Gram(s) IV Push once  dextrose 50% Injectable 12.5 Gram(s) IV Push once  dextrose 50% Injectable 25 Gram(s) IV Push once  diltiazem Infusion 10 mG/Hr (10 mL/Hr) IV Continuous <Continuous>  dorzolamide 2%/timolol 0.5% Ophthalmic Solution 1 Drop(s) Both EYES two times a day  glucagon  Injectable 1 milliGRAM(s) IntraMuscular once  insulin lispro (ADMELOG) corrective regimen sliding scale   SubCutaneous every 6 hours  lidocaine   4% Patch 1 Patch Transdermal daily  metoprolol tartrate 50 milliGRAM(s) Oral every 6 hours  rivaroxaban 20 milliGRAM(s) Oral daily    MEDICATIONS  (PRN):  acetaminophen     Tablet .. 650 milliGRAM(s) Oral every 6 hours PRN Mild Pain (1 - 3)  dextrose Oral Gel 15 Gram(s) Oral once PRN Blood Glucose LESS THAN 70 milliGRAM(s)/deciliter  metoprolol tartrate Injectable 5 milliGRAM(s) IV Push every 6 hours PRN HR >120      DIAGNOSTIC TESTING:  [ ] Echocardiogram:   < from: TTE Echo Complete w/o Contrast w/ Doppler (05.30.22 @ 13:11) >  Summary:   1. Technically difficult study.   2. Due to rapid Afib overall left ventricular ejection fraction   difficult to accurately assess, but grossly by visual estimation probably   is 50 to 55%.   3. The mitral in-flow pattern reveals no discernable A-wave, therefore   no comment on diastolic function can be made.   4. Normal right ventricular size and function, estimated PASP least 20   mmHg.   5. Mildly enlarged left atrium.   6. Mild mitral annular calcification.   7. Mild mitral valve regurgitation.   8. Mild tricuspid regurgitation.   9. Sclerotic aortic valve with normal opening.  10. There is no evidence of pericardial effusion.    < end of copied text >    [ ]  Catheterization:  [ ] Stress Test:      Labs:                        12.3   8.99  )-----------( 325      ( 31 May 2022 13:01 )             38.1     06-01    139  |  108<H>  |  21.5<H>  ----------------------------<  230<H>  4.0   |  21.0<L>  |  1.14    Ca    9.2      01 Jun 2022 07:15  Phos  1.9     05-31  Mg     1.7     05-31    TPro  7.3  /  Alb  3.6  /  TBili  0.9  /  DBili  x   /  AST  13  /  ALT  14  /  AlkPhos  55  05-31            A1C with Estimated Average Glucose Result: 10.3 % (05-27-22 @ 09:23)      TELEMETRY: Afib 130s

## 2022-06-01 NOTE — PROGRESS NOTE ADULT - ASSESSMENT
89M with a history of hypertension, diabetes, gastric mass, and GERD who presented with a three day history of confusion and lethargy.       VINCE 2/2 urinary retention  Cr improving  c/w conley  continue to hold HCTZ-lisinopril  will resume tamsulosin      Toxic metabolic encephalopathy   SLP following- rec soft bite sized diet with mildly thick liquid    A fib  HR uncontrolled  cardiology following. c/w cardizem gtt, metoprolol increased to 50mg Q6H, started on AC per cardiology          Diabetes - Insulin coverage,     Hypertension - Close blood pressure monitoring, meds on on hold.     HLD: will resume statins once ok to take oral meds.     Glaucoma - Continue with eye drops.    Neuropathy - For resumption of gabapentin if the patient is able to tolerate oral intake.      Overall prognosis guarded / poor. Palliative Care following.  DNR/DNI  DVT prophylaxis: Scds    Dispo: plan for dc once HR is better controlled

## 2022-06-01 NOTE — PROGRESS NOTE ADULT - ASSESSMENT
89M history as listed significant for Advanced dementia, HTN, Afib (not on anticoagulation) admitted with urinary retention/VINCE 133/8.3 and hyperkalemia, noted Afib RVR 150s overnight, unable to take oral meds pending speech/swallow started on IV diltiazem gtt      -Afib RVR with gain HR increased to 130-140s restarted on diltiazem 5mg/hr, increase metoprolol 50mg q6hrs, can change to 100mg BID once HR is controlled, advanced dementia would not offer GLENIS/CV at this juncture  -patient passed swallow study, daughter yesterday at bedside fed soft food items, need pills to be crushed, mainly bedbound nonambulatory, reports has 10hrs HHA  -had prior stroke >20yrs ago per his daughter, elevated CHADSVAsc score recommend anticoagulation, no recent bleeding history, started Xarelto 20mg once daily   -TTE grossly preserved LV EF  -continue to address goals of care, long term prognosis guarded       Yannick Coates DO, [facility]  Faculty Non-Invasive Cardiologist  [phone].

## 2022-06-01 NOTE — PROGRESS NOTE ADULT - ASSESSMENT
VINCE non oliguric  NICHOLS and hypovolemia- better  Hypernatremia- improved  Cardio follow up re. afib noted   - Would add Flomax , but can't be crushed , follow swallow evaluation   - avoid potential nephrotoxins  - cont conley  - Off IVF now   - follow labs    Will follow

## 2022-06-01 NOTE — PROGRESS NOTE ADULT - SUBJECTIVE AND OBJECTIVE BOX
[facility] Division of Hospital Medicine    Chief Complaint:      SUBJECTIVE / OVERNIGHT EVENTS: remains tachycardic. cardizem gtt restarted        MEDICATIONS  (STANDING):  aspirin enteric coated 81 milliGRAM(s) Oral daily  dextrose 5%. 1000 milliLiter(s) (50 mL/Hr) IV Continuous <Continuous>  dextrose 5%. 1000 milliLiter(s) (100 mL/Hr) IV Continuous <Continuous>  dextrose 50% Injectable 25 Gram(s) IV Push once  dextrose 50% Injectable 12.5 Gram(s) IV Push once  dextrose 50% Injectable 25 Gram(s) IV Push once  diltiazem Infusion 10 mG/Hr (10 mL/Hr) IV Continuous <Continuous>  dorzolamide 2%/timolol 0.5% Ophthalmic Solution 1 Drop(s) Both EYES two times a day  glucagon  Injectable 1 milliGRAM(s) IntraMuscular once  insulin lispro (ADMELOG) corrective regimen sliding scale   SubCutaneous every 6 hours  lidocaine   4% Patch 1 Patch Transdermal daily  metoprolol tartrate 25 milliGRAM(s) Oral every 6 hours  sodium chloride 0.45%. 1000 milliLiter(s) (100 mL/Hr) IV Continuous <Continuous>    MEDICATIONS  (PRN):  dextrose Oral Gel 15 Gram(s) Oral once PRN Blood Glucose LESS THAN 70 milliGRAM(s)/deciliter  metoprolol tartrate Injectable 5 milliGRAM(s) IV Push every 6 hours PRN HR >120        I&O's Summary    30 May 2022 07:01  -  31 May 2022 07:00  --------------------------------------------------------  IN: 2345 mL / OUT: 2300 mL / NET: 45 mL    31 May 2022 07:01  -  31 May 2022 14:09  --------------------------------------------------------  IN: 675 mL / OUT: 0 mL / NET: 675 mL        PHYSICAL EXAM:  Vital Signs Last 24 Hrs  T(C): 36.7 (31 May 2022 13:24), Max: 37.2 (30 May 2022 22:49)  T(F): 98 (31 May 2022 13:24), Max: 98.9 (30 May 2022 22:49)  HR: 134 (31 May 2022 13:24) (84 - 138)  BP: 151/111 (31 May 2022 13:24) (108/74 - 151/121)  BP(mean): --  RR: 18 (31 May 2022 13:24) (18 - 18)  SpO2: 100% (31 May 2022 13:24) (97% - 100%)        CONSTITUTIONAL: NAD,  ENMT: Moist oral mucosa, no pharyngeal injection or exudates; normal dentition  RESPIRATORY: Normal respiratory effort; lungs are clear to auscultation bilaterally  CARDIOVASCULAR: tachycardia normal S1 and S2, no murmur/rub/gallop; No lower extremity edema; Peripheral pulses are 2+ bilaterally  ABDOMEN: Nontender to palpation, normoactive bowel sounds, no rebound/guarding; No hepatosplenomegaly  MUSCLOSKELETAL:  Normal gait; no clubbing or cyanosis of digits; no joint swelling or tenderness to palpation  PSYCH: A+O to person, place, and time; affect appropriate  NEUROLOGY: CN 2-12 are intact and symmetric;   SKIN: No rashes; no palpable lesions    LABS:                        12.3   8.99  )-----------( 325      ( 31 May 2022 13:01 )             38.1     05-31    138  |  103  |  26.2<H>  ----------------------------<  239<H>  4.4   |  22.0  |  1.25    Ca    9.8      31 May 2022 13:01  Phos  1.9     05-31  Mg     1.7     05-31    TPro  7.3  /  Alb  3.6  /  TBili  0.9  /  DBili  x   /  AST  13  /  ALT  14  /  AlkPhos  55  05-31              CAPILLARY BLOOD GLUCOSE      POCT Blood Glucose.: 230 mg/dL (31 May 2022 12:03)  POCT Blood Glucose.: 215 mg/dL (31 May 2022 05:44)  POCT Blood Glucose.: 182 mg/dL (30 May 2022 22:57)  POCT Blood Glucose.: 328 mg/dL (30 May 2022 16:52)        RADIOLOGY & ADDITIONAL TESTS:  Results Reviewed:   Imaging Personally Reviewed:  Electrocardiogram Personally Reviewed:

## 2022-06-01 NOTE — PROGRESS NOTE ADULT - SUBJECTIVE AND OBJECTIVE BOX
NEPHROLOGY INTERVAL HPI/OVERNIGHT EVENTS:    cardio follow up noted   No new events   + [facility] , UO 1.9 L   Meds noted     MEDICATIONS  (STANDING):  dextrose 5%. 1000 milliLiter(s) (50 mL/Hr) IV Continuous <Continuous>  dextrose 5%. 1000 milliLiter(s) (100 mL/Hr) IV Continuous <Continuous>  dextrose 50% Injectable 25 Gram(s) IV Push once  dextrose 50% Injectable 12.5 Gram(s) IV Push once  dextrose 50% Injectable 25 Gram(s) IV Push once  diltiazem Infusion 10 mG/Hr (10 mL/Hr) IV Continuous <Continuous>  dorzolamide 2%/timolol 0.5% Ophthalmic Solution 1 Drop(s) Both EYES two times a day  glucagon  Injectable 1 milliGRAM(s) IntraMuscular once  insulin lispro (ADMELOG) corrective regimen sliding scale   SubCutaneous every 6 hours  lidocaine   4% Patch 1 Patch Transdermal daily  metoprolol tartrate 50 milliGRAM(s) Oral every 6 hours  rivaroxaban 20 milliGRAM(s) Oral daily    MEDICATIONS  (PRN):  acetaminophen     Tablet .. 650 milliGRAM(s) Oral every 6 hours PRN Mild Pain (1 - 3)  dextrose Oral Gel 15 Gram(s) Oral once PRN Blood Glucose LESS THAN 70 milliGRAM(s)/deciliter  metoprolol tartrate Injectable 5 milliGRAM(s) IV Push every 6 hours PRN HR >120      Allergies    No Known Allergies    Intolerances            Vital Signs Last 24 Hrs  T(C): 36.7 (2022 10:25), Max: 36.8 (31 May 2022 16:49)  T(F): 98 (2022 10:25), Max: 98.2 (31 May 2022 16:49)  HR: 136 (2022 10:25) (106 - 138)  BP: 125/81 (2022 10:25) (118/83 - 151/111)  BP(mean): --  RR: 18 (2022 10:50) (18 - 18)  SpO2: 99% (2022 10:50) (99% - 100%)  Daily     Daily Weight in k.4 (2022 05:40)  I&O's Detail    31 May 2022 07:01  -  2022 07:00  --------------------------------------------------------  IN:    Diltiazem: 80 mL    Oral Fluid: 60 mL    sodium chloride 0.45%: 1900 mL  Total IN: 2040 mL    OUT:    Indwelling Catheter - Urethral (mL): 1900 mL  Total OUT: 1900 mL    Total NET: 140 mL        I&O's Summary    31 May 2022 07:01  -  2022 07:00  --------------------------------------------------------  IN: 2040 mL / OUT: 1900 mL / NET: 140 mL      PHYSICAL EXAM:  GENERAL: Frail debilitated  HEAD:  Atraumatic, Normocephalic  NECK: Supple  NERVOUS SYSTEM:  Arousable confused  CHEST/LUNG: Clear to percussion bilaterally  HEART: Regular rate and rhythm  ABDOMEN: Soft, Nontender, Nondistended; +BS  EXTREMITIES:  + dependent edema  LABS:                        12.3   8.99  )-----------( 325      ( 31 May 2022 13:01 )             38.1     06-01    139  |  108<H>  |  21.5<H>  ----------------------------<  230<H>  4.0   |  21.0<L>  |  1.14    Ca    9.2      2022 07:15  Phos  1.9       Mg     1.7         TPro  7.3  /  Alb  3.6  /  TBili  0.9  /  DBili  x   /  AST  13  /  ALT  14  /  AlkPhos  55          Magnesium, Serum: 1.7 mg/dL ( @ 13:01)  Phosphorus Level, Serum: 1.9 mg/dL ( @ 13:01)          RADIOLOGY & ADDITIONAL TESTS:

## 2022-06-02 ENCOUNTER — TRANSCRIPTION ENCOUNTER (OUTPATIENT)
Age: 87
End: 2022-06-02

## 2022-06-02 DIAGNOSIS — N32.0 BLADDER-NECK OBSTRUCTION: ICD-10-CM

## 2022-06-02 DIAGNOSIS — I48.91 UNSPECIFIED ATRIAL FIBRILLATION: ICD-10-CM

## 2022-06-02 DIAGNOSIS — N40.1 BENIGN PROSTATIC HYPERPLASIA WITH LOWER URINARY TRACT SYMPTOMS: ICD-10-CM

## 2022-06-02 LAB
GLUCOSE BLDC GLUCOMTR-MCNC: 173 MG/DL — HIGH (ref 70–99)
GLUCOSE BLDC GLUCOMTR-MCNC: 209 MG/DL — HIGH (ref 70–99)
GLUCOSE BLDC GLUCOMTR-MCNC: 339 MG/DL — HIGH (ref 70–99)
GLUCOSE BLDC GLUCOMTR-MCNC: 373 MG/DL — HIGH (ref 70–99)
HCT VFR BLD CALC: 31.2 % — LOW (ref 39–50)
HGB BLD-MCNC: 10.3 G/DL — LOW (ref 13–17)
MCHC RBC-ENTMCNC: 27.8 PG — SIGNIFICANT CHANGE UP (ref 27–34)
MCHC RBC-ENTMCNC: 33 GM/DL — SIGNIFICANT CHANGE UP (ref 32–36)
MCV RBC AUTO: 84.3 FL — SIGNIFICANT CHANGE UP (ref 80–100)
PLATELET # BLD AUTO: 275 K/UL — SIGNIFICANT CHANGE UP (ref 150–400)
RBC # BLD: 3.7 M/UL — LOW (ref 4.2–5.8)
RBC # FLD: 11.9 % — SIGNIFICANT CHANGE UP (ref 10.3–14.5)
WBC # BLD: 11.89 K/UL — HIGH (ref 3.8–10.5)
WBC # FLD AUTO: 11.89 K/UL — HIGH (ref 3.8–10.5)

## 2022-06-02 PROCEDURE — 99233 SBSQ HOSP IP/OBS HIGH 50: CPT

## 2022-06-02 PROCEDURE — 99232 SBSQ HOSP IP/OBS MODERATE 35: CPT

## 2022-06-02 PROCEDURE — 74176 CT ABD & PELVIS W/O CONTRAST: CPT | Mod: 26

## 2022-06-02 RX ORDER — METOPROLOL TARTRATE 50 MG
50 TABLET ORAL ONCE
Refills: 0 | Status: COMPLETED | OUTPATIENT
Start: 2022-06-02 | End: 2022-06-02

## 2022-06-02 RX ORDER — DILTIAZEM HCL 120 MG
12 CAPSULE, EXT RELEASE 24 HR ORAL
Qty: 125 | Refills: 0 | Status: DISCONTINUED | OUTPATIENT
Start: 2022-06-02 | End: 2022-06-02

## 2022-06-02 RX ORDER — METOPROLOL TARTRATE 50 MG
100 TABLET ORAL EVERY 12 HOURS
Refills: 0 | Status: DISCONTINUED | OUTPATIENT
Start: 2022-06-02 | End: 2022-06-07

## 2022-06-02 RX ORDER — INSULIN GLARGINE 100 [IU]/ML
8 INJECTION, SOLUTION SUBCUTANEOUS AT BEDTIME
Refills: 0 | Status: DISCONTINUED | OUTPATIENT
Start: 2022-06-02 | End: 2022-06-07

## 2022-06-02 RX ADMIN — RIVAROXABAN 20 MILLIGRAM(S): KIT at 12:20

## 2022-06-02 RX ADMIN — Medication 50 MILLIGRAM(S): at 12:20

## 2022-06-02 RX ADMIN — LIDOCAINE 1 PATCH: 4 CREAM TOPICAL at 01:11

## 2022-06-02 RX ADMIN — Medication 50 MILLIGRAM(S): at 09:50

## 2022-06-02 RX ADMIN — INSULIN GLARGINE 8 UNIT(S): 100 INJECTION, SOLUTION SUBCUTANEOUS at 21:10

## 2022-06-02 RX ADMIN — Medication 12 MG/HR: at 06:33

## 2022-06-02 RX ADMIN — Medication 650 MILLIGRAM(S): at 21:20

## 2022-06-02 RX ADMIN — TAMSULOSIN HYDROCHLORIDE 0.4 MILLIGRAM(S): 0.4 CAPSULE ORAL at 21:16

## 2022-06-02 RX ADMIN — Medication 2: at 06:25

## 2022-06-02 RX ADMIN — Medication 100 MILLIGRAM(S): at 21:16

## 2022-06-02 RX ADMIN — Medication 50 MILLIGRAM(S): at 06:32

## 2022-06-02 RX ADMIN — Medication 4: at 18:00

## 2022-06-02 RX ADMIN — Medication 650 MILLIGRAM(S): at 22:20

## 2022-06-02 NOTE — PROGRESS NOTE ADULT - ASSESSMENT
89M with a history of hypertension, diabetes, gastric mass, and GERD who presented with a three day history of confusion and lethargy.       VINCE 2/2 urinary retention  Cr improving  c/w conley  continue to hold HCTZ-lisinopril  c/w tamsulosin      Toxic metabolic encephalopathy   SLP following- rec soft bite sized diet with mildly thick liquid    A fib  HR uncontrolled  cardiology following. c/w cardizem gtt, metoprolol 50mg Q6H, started on AC per cardiology  cardizem gtt increased to 12mcg/min overnight. HR 90s-100s this am. management per cardiology          Diabetes - Insulin coverage,     Hypertension - Close blood pressure monitoring, meds on on hold.     HLD: will resume statins once ok to take oral meds.     Glaucoma - Continue with eye drops.    Neuropathy - For resumption of gabapentin if the patient is able to tolerate oral intake.      Overall prognosis guarded / poor. Palliative Care following.  DNR/DNI  DVT prophylaxis: Scds    Dispo: plan for dc once HR is better controlled     89M with a history of hypertension, diabetes, gastric mass, and GERD who presented with a three day history of confusion and lethargy.       VINCE 2/2 urinary retention  Cr improving  c/w conley  continue to hold HCTZ-lisinopril  c/w tamsulosin    urinary retention likely BPH  no imaging done on admission  c/w tamsulosin  urology consulted- f/u rec whether to do voiding trial prior to discharge or discharge on conley.     Toxic metabolic encephalopathy   SLP following- rec soft bite sized diet with mildly thick liquid    A fib  HR uncontrolled  cardiology following. c/w cardizem gtt, metoprolol 50mg Q6H, started on AC per cardiology  cardizem gtt increased to 12mcg/min overnight. HR 90s-100s this am. management per cardiology          Diabetes - Insulin coverage,     Hypertension - Close blood pressure monitoring, meds on on hold.     HLD: will resume statins once ok to take oral meds.     Glaucoma - Continue with eye drops.    Neuropathy - For resumption of gabapentin if the patient is able to tolerate oral intake.      Overall prognosis guarded / poor. Palliative Care following.  DNR/DNI  DVT prophylaxis: Scds    Dispo: plan for dc once HR is better controlled

## 2022-06-02 NOTE — PROGRESS NOTE ADULT - SUBJECTIVE AND OBJECTIVE BOX
[facility]                                                         CARDIOLOGY AT [facility]                                                                  [address] [facility], [place] [place] [address]                                                         Telephone: [phone]. Fax:[phone]                                                                             PROGRESS NOTE    Reason for follow up:   afib rvr  Update:   Currently afib 90's, overnight sustained 130 and increased Cardizem drip.   Plan is to increase bb to lopressor 100 po q12 and titrate off drip today.  Discharge planning for tomorrow if stable on monitor overnight.  Patient refusing jarvis Dominguez MD Santoyo aware and will address with family.    Review of symptoms:   Cardiac:  No chest pain. No dyspnea. No palpitations.  Respiratory: no cough. No dyspnea  Gastrointestinal: No diarrhea. No abdominal pain. No bleeding.   Neuro: No focal neuro complaints.    Vitals:  T(C): 36.4 (06-02-22 @ 05:50), Max: 36.9 (06-01-22 @ 15:54)  HR: 130 (06-02-22 @ 05:50) (91 - 136)  BP: 111/75 (06-02-22 @ 05:50) (98/63 - 133/91)  RR: 18 (06-02-22 @ 05:50) (18 - 18)  SpO2: 100% (06-02-22 @ 05:50) (97% - 100%)    I&O's Summary  01 Jun 2022 07:01  -  02 Jun 2022 07:00  --------------------------------------------------------  IN: 0 mL / OUT: 850 mL / NET: -850 mL    PHYSICAL EXAM:  Appearance: Comfortable. No acute distress, thin frail  HEENT:  Atraumatic. Normocephalic.  Normal oral mucosa  Neurologic: A & O x 3, no gross focal deficits.  Cardiovascular: RRR S1 S2, No murmur, no rubs/gallops. No JVD  Respiratory: Lungs clear to auscultation, unlabored   Gastrointestinal:  Soft, Non-tender, + BS  Lower Extremities: 2+ Peripheral Pulses, No clubbing, cyanosis, + edema, thick scaly and dry  Psychiatry: Patient is calm. No agitation.   Skin: warm and dry.    CURRENT CARDIAC MEDICATIONS:  diltiazem Infusion 12 mG/Hr IV Continuous <Continuous>  metoprolol tartrate 50 milliGRAM(s) Oral every 6 hours  metoprolol tartrate Injectable 5 milliGRAM(s) IV Push every 6 hours PRN  tamsulosin 0.4 milliGRAM(s) Oral at bedtime    CURRENT OTHER MEDICATIONS:  acetaminophen     Tablet .. 650 milliGRAM(s) Oral every 6 hours PRN Mild Pain (1 - 3)  dextrose Oral Gel 15 Gram(s) Oral once, Stop order after: 1 Doses PRN Blood Glucose LESS THAN 70 milliGRAM(s)/deciliter  glucagon  Injectable 1 milliGRAM(s) IntraMuscular once, Stop order after: 1 Doses  insulin glargine Injectable (LANTUS) 4 Unit(s) SubCutaneous at bedtime  insulin lispro (ADMELOG) corrective regimen sliding scale   SubCutaneous every 6 hours  dorzolamide 2%/timolol 0.5% Ophthalmic Solution 1 Drop(s) Both EYES two times a day  lidocaine   4% Patch 1 Patch Transdermal daily  rivaroxaban 20 milliGRAM(s) Oral daily    LABS:	 	                          10.3   11.89 )-----------( 275      ( 02 Jun 2022 07:20 )             31.2     06-01    139  |  108<H>  |  21.5<H>  ----------------------------<  230<H>  4.0   |  21.0<L>  |  1.14    Ca    9.2      01 Jun 2022 07:15  Phos  1.9     05-31  Mg     1.7     05-31    TPro  7.3  /  Alb  3.6  /  TBili  0.9  /  DBili  x   /  AST  13  /  ALT  14  /  AlkPhos  55  05-31    PT/INR/PTT ( 25 May 2022 11:16 )      12.5         :       29.5                  .        .                   .              .           .       1.08        .                                       Lipid Profile:   TSH: Thyroid Stimulating Hormone, Serum: 1.20 uIU/mL    TELEMETRY:  AFib 90's.  Remained 130 overnight.

## 2022-06-02 NOTE — CHART NOTE - NSCHARTNOTEFT_GEN_A_CORE
Contacted by RN, pt HR sustaining >130, has been receiving metoprolol and cardizem gtt running at 10.  Cardizem gtt increased to 12, will monitor closely overnight, Pt NAD, VSS.

## 2022-06-02 NOTE — CONSULT NOTE ADULT - ASSESSMENT
PE:  Afebrile, VS /73, P 96, R 18  patient lying in bed , no acute distress, creole speaking,       : conley catheter in place - functioning well urine yellow/ clear with 550 cc last shift.      Labs:   6/2/22  cbc-wbc: 11.8/ hg 10.3/ hct 31.2/ plats 275    lytes:   6/1/22  139/4.0/108/21  Bun: 21.5/ down from admission ***133.5***  creat: 1.14/ down from admission ***8.13***    No radiology images for review of kidney, ureters, bladder or prostate.    Last significant study CT. Abdomen & pelvis 12/2/2019 with impression:  heterogeneously prominant prostate with nodular densitynear apex and bladder protursion.  Mild bladder wall thickening with moderate distention.  This may be related to chronic bladder outlet obstruction from enlarged prostateconsider direct visualization if concern for bladder malignancy.         Impression: ACUTE renal failure secondary the chronic bladder outlet obstruction most likely secondary to enlarged prostate.  urinary retention relieved with Conley and ARF improved rapidly after Conley place.  Discussed with Dr. Carranza present situation and management.  Plan: Urology recommendation is can be discharged with Conley catheter, and arrangements made for out patient w/u  chronic bladder outlet obstruction .

## 2022-06-02 NOTE — PROGRESS NOTE ADULT - NS ATTEND AMEND GEN_ALL_CORE FT
agree with above,    89M history as listed significant for Advanced dementia, HTN, Afib (not on anticoagulation) admitted with urinary retention/VINCE 133/8.3 and hyperkalemia, noted Afib RVR 150s overnight, unable to take oral meds pending speech/swallow started on IV diltiazem gtt    Bilateral arms infiltrated from IV sites again. Daughters eager to bring him home.      -Afib RVR HR increased to 130-140s restarted on diltiazem 5mg/hr, increased metoprolol 50mg q6hrs, HR controlled this morning, can change to 100mg BID, advanced dementia would not offer GLENIS/CV at this juncture  -patient passed swallow study, daughter  at bedside fed soft food items, need pills to be crushed, mainly bedbound nonambulatory, reports has 10hrs HHA  -had prior stroke >20yrs ago per his daughter, elevated CHADSVAsc score recommend anticoagulation, no recent bleeding history, started Xarelto 20mg once daily   -TTE grossly preserved LV EF  -no need other antihypertensive  -need chronic conley for urinary retention/VINCE seen by urology  -continue to address goals of care, long term prognosis guarded   -discharge planning, cardiology will sign off        Yannick Coates DO, [facility]  Faculty Non-Invasive Cardiologist  [phone].

## 2022-06-02 NOTE — PROGRESS NOTE ADULT - ASSESSMENT
This is a 89 year old male PMhx IDDM , hypertension, hyperlipidemia, GERD and ARF arrived to [facility] for lethargy and confusion. Found to have urinary retention, 2 L of urine drained in ED. Patient is creole speaking - noted to be confused and lethargic for 3 days at home. Also noted by family that patient's blood sugar was as low as 35 today.  No reports of chest pain, sob, fever, chills, n/v/d/c. Admitted to team of medicine for Acute renal failure- hyperkalemia- hypoglycemia. Called for palliative care consult to discuss GOC as previously was DNR/DNI now Full code.     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  this would be an acceptable hospice dx if patient not pursuing oncologic interventions    Problem/Recommendation 4: Debility  Assist in ADLS  Maintain safety, fall, aspiration precautions    Problem/Recommendation 5: Palliative Care Encounter  Patient appears comfortable, no signs/symptoms of acute distress noted  Patient able to open his eyes to verbal/physical stimuli  New MOLST form completed during previous encounter for DNR DNI  GOC - Plan for patient to return home with CDPAP aides (once medically cleared)  Both adult daughters(Gricelda) are aware patient is home hospice eligible if they want to pursue it in the future  Dispo planning per case management/social work of 4 T    GOC have been established, no further needs to be addressed from Palliative care perspective.  Will sign off at this time. Please reconsult if GOC change or condition decompensates requiring further palliative care assistance.    HCP/Surrogate:Gricelda  Code Status:      DNR DNI                                            Total Time Spent__35_ minutes  This includes chart review, patient assessment, discussion and collaboration with interdisciplinary team members, ACP planning      Thank you for the opportunity to assist with the care of this patient.   [facility] Palliative Medicine Consult Service [phone].

## 2022-06-02 NOTE — CONSULT NOTE ADULT - PROBLEM SELECTOR RECOMMENDATION 9
c/w asa  would not anticoaguate due age and frail state  would hydrate patient  would start cardizem drip to control rate until patient able to take po  Tsh ok  check TTE
[facility] with out patient w/u

## 2022-06-02 NOTE — PROGRESS NOTE ADULT - PROBLEM SELECTOR PLAN 1
-Afib RVR with gain HR increased to 130-140  Currently 90's  titrate off Cardizem and start lopressor 100 po bid  Continue to monitor on telemetry and possible d.c in am if stable.  Due to advanced dementia  GLENIS/CV not recommend   -Soft diet, need pills to be crushed,   -had prior stroke >20yrs ago per his daughter, elevated CHADSVAsc score recommend anticoagulation, no recent bleeding history, started Xarelto 20mg once daily   -TTE grossly preserved LV EF  -continue to address goals of care, long term prognosis guarded

## 2022-06-02 NOTE — CONSULT NOTE ADULT - REASON FOR ADMISSION
Acute renal failure- hyperkalemia- hypoglycemia

## 2022-06-02 NOTE — CONSULT NOTE ADULT - CONSULT REASON
urinary retention / chronic urinary bladder outlet obstruction
ARF
GOC
Atrial fib with rapid silvino response

## 2022-06-02 NOTE — PROGRESS NOTE ADULT - ASSESSMENT
89M history as listed significant for Advanced dementia, HTN, Afib (not on anticoagulation) admitted with urinary retention/VINCE 133/8.3 and hyperkalemia, noted Afib RVR 150s overnight, unable to take oral meds pending speech/swallow started on IV diltiazem gtt.  Patient now eating, drinking and taking his pills.  On a soft diet.  Overnight had to increase IV Cardizem as heart rate was mostly in the 130s.   Not rate controlled in the 90's.      Dispo:  D.C in am if heart rate remains stable  Family refusing conley at this time - discussed with MD Santoyo to address with family 89M history as listed significant for Advanced dementia, HTN, Afib (not on anticoagulation) admitted with urinary retention/VINCE 133/8.3 and hyperkalemia, noted Afib RVR 150s overnight, unable to take oral meds pending speech/swallow started on IV diltiazem gtt.  Patient now eating, drinking and taking his pills.  On a soft diet.  Overnight had to increase IV Cardizem as heart rate was mostly in the 130s.   Not rate controlled in the 90's.

## 2022-06-02 NOTE — PROGRESS NOTE ADULT - SUBJECTIVE AND OBJECTIVE BOX
NEPHROLOGY INTERVAL HPI/OVERNIGHT EVENTS:    Urology follow up noted   Seen earlier today   No events     MEDICATIONS  (STANDING):  dextrose 5%. 1000 milliLiter(s) (50 mL/Hr) IV Continuous <Continuous>  dextrose 5%. 1000 milliLiter(s) (100 mL/Hr) IV Continuous <Continuous>  dextrose 50% Injectable 25 Gram(s) IV Push once  dextrose 50% Injectable 12.5 Gram(s) IV Push once  dextrose 50% Injectable 25 Gram(s) IV Push once  dorzolamide 2%/timolol 0.5% Ophthalmic Solution 1 Drop(s) Both EYES two times a day  glucagon  Injectable 1 milliGRAM(s) IntraMuscular once  insulin glargine Injectable (LANTUS) 8 Unit(s) SubCutaneous at bedtime  insulin lispro (ADMELOG) corrective regimen sliding scale   SubCutaneous every 6 hours  lidocaine   4% Patch 1 Patch Transdermal daily  metoprolol tartrate 100 milliGRAM(s) Oral every 12 hours  rivaroxaban 20 milliGRAM(s) Oral daily  tamsulosin 0.4 milliGRAM(s) Oral at bedtime    MEDICATIONS  (PRN):  acetaminophen     Tablet .. 650 milliGRAM(s) Oral every 6 hours PRN Mild Pain (1 - 3)  dextrose Oral Gel 15 Gram(s) Oral once PRN Blood Glucose LESS THAN 70 milliGRAM(s)/deciliter  metoprolol tartrate Injectable 5 milliGRAM(s) IV Push every 6 hours PRN HR >120      Allergies    No Known Allergies    Intolerances        Vital Signs Last 24 Hrs  T(C): 36.9 (2022 10:00), Max: 36.9 (2022 15:54)  T(F): 98.4 (2022 10:00), Max: 98.4 (2022 15:54)  HR: 96 (2022 10:00) (91 - 130)  BP: 100/73 (2022 12:00) (98/63 - 130/83)  BP(mean): --  RR: 18 (2022 12:00) (18 - 18)  SpO2: 98% (2022 12:00) (97% - 100%)  Daily     Daily Weight in k (2022 04:48)  I&O's Detail    2022 07:01  -  2022 07:00  --------------------------------------------------------  IN:  Total IN: 0 mL    OUT:    Indwelling Catheter - Urethral (mL): 550 mL    Voided (mL): 300 mL  Total OUT: 850 mL    Total NET: -850 mL        I&O's Summary    2022 07:01  -  2022 07:00  --------------------------------------------------------  IN: 0 mL / OUT: 850 mL / NET: -850 mL        PHYSICAL EXAM:      PHYSICAL EXAM:  GENERAL: Frail debilitated  HEAD:  Atraumatic, Normocephalic  NECK: Supple  NERVOUS SYSTEM:  Arousable confused  CHEST/LUNG: Clear to percussion bilaterally  HEART: Regular rate and rhythm  ABDOMEN: Soft, Nontender, Nondistended; +BS  EXTREMITIES:  + dependent edema  LABS:                        10.3   11.89 )-----------( 275      ( 2022 07:20 )             31.2     06-01    139  |  108<H>  |  21.5<H>  ----------------------------<  230<H>  4.0   |  21.0<L>  |  1.14    Ca    9.2      2022 07:15                  RADIOLOGY & ADDITIONAL TESTS:

## 2022-06-02 NOTE — PROGRESS NOTE ADULT - SUBJECTIVE AND OBJECTIVE BOX
[facility] Division of Hospital Medicine    Chief Complaint:      SUBJECTIVE / OVERNIGHT EVENTS: remains tachycardic overnight. still on cardizem gtt . HR better this am 99-100s        MEDICATIONS  (STANDING):  aspirin enteric coated 81 milliGRAM(s) Oral daily  dextrose 5%. 1000 milliLiter(s) (50 mL/Hr) IV Continuous <Continuous>  dextrose 5%. 1000 milliLiter(s) (100 mL/Hr) IV Continuous <Continuous>  dextrose 50% Injectable 25 Gram(s) IV Push once  dextrose 50% Injectable 12.5 Gram(s) IV Push once  dextrose 50% Injectable 25 Gram(s) IV Push once  diltiazem Infusion 10 mG/Hr (10 mL/Hr) IV Continuous <Continuous>  dorzolamide 2%/timolol 0.5% Ophthalmic Solution 1 Drop(s) Both EYES two times a day  glucagon  Injectable 1 milliGRAM(s) IntraMuscular once  insulin lispro (ADMELOG) corrective regimen sliding scale   SubCutaneous every 6 hours  lidocaine   4% Patch 1 Patch Transdermal daily  metoprolol tartrate 25 milliGRAM(s) Oral every 6 hours  sodium chloride 0.45%. 1000 milliLiter(s) (100 mL/Hr) IV Continuous <Continuous>    MEDICATIONS  (PRN):  dextrose Oral Gel 15 Gram(s) Oral once PRN Blood Glucose LESS THAN 70 milliGRAM(s)/deciliter  metoprolol tartrate Injectable 5 milliGRAM(s) IV Push every 6 hours PRN HR >120        I&O's Summary    30 May 2022 07:01  -  31 May 2022 07:00  --------------------------------------------------------  IN: 2345 mL / OUT: 2300 mL / NET: 45 mL    31 May 2022 07:01  -  31 May 2022 14:09  --------------------------------------------------------  IN: 675 mL / OUT: 0 mL / NET: 675 mL        PHYSICAL EXAM:  Vital Signs Last 24 Hrs  T(C): 36.7 (31 May 2022 13:24), Max: 37.2 (30 May 2022 22:49)  T(F): 98 (31 May 2022 13:24), Max: 98.9 (30 May 2022 22:49)  HR: 134 (31 May 2022 13:24) (84 - 138)  BP: 151/111 (31 May 2022 13:24) (108/74 - 151/121)  BP(mean): --  RR: 18 (31 May 2022 13:24) (18 - 18)  SpO2: 100% (31 May 2022 13:24) (97% - 100%)        CONSTITUTIONAL: NAD,  ENMT: Moist oral mucosa, no pharyngeal injection or exudates; normal dentition  RESPIRATORY: Normal respiratory effort; lungs are clear to auscultation bilaterally  CARDIOVASCULAR: tachycardia normal S1 and S2, no murmur/rub/gallop; No lower extremity edema; Peripheral pulses are 2+ bilaterally  ABDOMEN: Nontender to palpation, normoactive bowel sounds, no rebound/guarding; No hepatosplenomegaly  MUSCLOSKELETAL:  Normal gait; no clubbing or cyanosis of digits; no joint swelling or tenderness to palpation  PSYCH: A+O to person, place, and time; affect appropriate  NEUROLOGY: CN 2-12 are intact and symmetric;   SKIN: No rashes; no palpable lesions    LABS:                        12.3   8.99  )-----------( 325      ( 31 May 2022 13:01 )             38.1     05-31    138  |  103  |  26.2<H>  ----------------------------<  239<H>  4.4   |  22.0  |  1.25    Ca    9.8      31 May 2022 13:01  Phos  1.9     05-31  Mg     1.7     05-31    TPro  7.3  /  Alb  3.6  /  TBili  0.9  /  DBili  x   /  AST  13  /  ALT  14  /  AlkPhos  55  05-31              CAPILLARY BLOOD GLUCOSE      POCT Blood Glucose.: 230 mg/dL (31 May 2022 12:03)  POCT Blood Glucose.: 215 mg/dL (31 May 2022 05:44)  POCT Blood Glucose.: 182 mg/dL (30 May 2022 22:57)  POCT Blood Glucose.: 328 mg/dL (30 May 2022 16:52)        RADIOLOGY & ADDITIONAL TESTS:  Results Reviewed:   Imaging Personally Reviewed:  Electrocardiogram Personally Reviewed:

## 2022-06-02 NOTE — CONSULT NOTE ADULT - SUBJECTIVE AND OBJECTIVE BOX
UROLOGY CONSULT:    Called to see patient for urinary bladder outlet obstruction, 89 y/o admitted 5/25/22 with ARF, urinary retention.  Conley was placed and evacuated 2 liters. conley has remained in place and patient now being prepared to discharge and requesting recommendation for Conley.  This is a 89 y/o creole speaking male with medical history significant for IDDM, HTN, HLD, GERD, BPH, and bladder outlet obstruction.      See admission note for full history .    Patient is a DNR at present

## 2022-06-02 NOTE — PROGRESS NOTE ADULT - SUBJECTIVE AND OBJECTIVE BOX
Palliative Care Followup    OVERNIGHT EVENTS: no acute events      Present Symptoms:   Dyspnea: Unable to obtain due to poor mentation   Nausea/Vomiting: Unable to obtain due to poor mentation   Anxiety:  Unable to obtain due to poor mentation   Depression: Unable to obtain due to poor mentation   Fatigue: Unable to obtain due to poor mentation    Loss of appetite: Unable to obtain due to poor mentation   Constipation: Unable to obtain due to poor mentation     Pain: Appears comfortable            Character-            Duration-            Effect-            Factors-            Frequency-            Location-            Severity-    Pain AD Score:0  [email or web address] (press ctrl + left click to view)    Review of Systems: Reviewed  Unable to obtain due to poor mentation   All others negative    MEDICATIONS  (STANDING):  dextrose 5%. 1000 milliLiter(s) (50 mL/Hr) IV Continuous <Continuous>  dextrose 5%. 1000 milliLiter(s) (100 mL/Hr) IV Continuous <Continuous>  dextrose 50% Injectable 25 Gram(s) IV Push once  dextrose 50% Injectable 12.5 Gram(s) IV Push once  dextrose 50% Injectable 25 Gram(s) IV Push once  diltiazem Infusion 12 mG/Hr (12 mL/Hr) IV Continuous <Continuous>  dorzolamide 2%/timolol 0.5% Ophthalmic Solution 1 Drop(s) Both EYES two times a day  glucagon  Injectable 1 milliGRAM(s) IntraMuscular once  insulin glargine Injectable (LANTUS) 4 Unit(s) SubCutaneous at bedtime  insulin lispro (ADMELOG) corrective regimen sliding scale   SubCutaneous every 6 hours  lidocaine   4% Patch 1 Patch Transdermal daily  metoprolol tartrate 50 milliGRAM(s) Oral once  metoprolol tartrate 100 milliGRAM(s) Oral every 12 hours  rivaroxaban 20 milliGRAM(s) Oral daily  tamsulosin 0.4 milliGRAM(s) Oral at bedtime    MEDICATIONS  (PRN):  acetaminophen     Tablet .. 650 milliGRAM(s) Oral every 6 hours PRN Mild Pain (1 - 3)  dextrose Oral Gel 15 Gram(s) Oral once PRN Blood Glucose LESS THAN 70 milliGRAM(s)/deciliter  metoprolol tartrate Injectable 5 milliGRAM(s) IV Push every 6 hours PRN HR >120      PHYSICAL EXAM:    Vital Signs Last 24 Hrs  T(C): 36.4 (02 Jun 2022 05:50), Max: 36.9 (01 Jun 2022 15:54)  T(F): 97.6 (02 Jun 2022 05:50), Max: 98.4 (01 Jun 2022 15:54)  HR: 130 (02 Jun 2022 05:50) (91 - 136)  BP: 111/75 (02 Jun 2022 05:50) (98/63 - 133/91)  BP(mean): --  RR: 18 (02 Jun 2022 05:50) (18 - 18)  SpO2: 100% (02 Jun 2022 05:50) (97% - 100%)      General: opens his eyes with verbal/physical stimuli    HEENT: dry mouth    Lungs: comfortable    CV: normal      GI: incontinent     : incontinent   conley    MSK: weakness  bedbound    Neuro: lethargic , confuesd    Skin: thin frail dry skin      LABS:                          10.3   11.89 )-----------( 275      ( 02 Jun 2022 07:20 )             31.2     06-01    139  |  108<H>  |  21.5<H>  ----------------------------<  230<H>  4.0   |  21.0<L>  |  1.14    Ca    9.2      01 Jun 2022 07:15  Phos  1.9     05-31  Mg     1.7     05-31    TPro  7.3  /  Alb  3.6  /  TBili  0.9  /  DBili  x   /  AST  13  /  ALT  14  /  AlkPhos  55  05-31    I&O's Summary    01 Jun 2022 07:01  -  02 Jun 2022 07:00  --------------------------------------------------------  IN: 0 mL / OUT: 850 mL / NET: -850 mL    RADIOLOGY & ADDITIONAL STUDIES:  no new    ADVANCE DIRECTIVES/TREATMENT PREFERENCES:  DNR DNI

## 2022-06-02 NOTE — PROGRESS NOTE ADULT - ASSESSMENT
VINCE non oliguric - resolved   NICHOLS and hypovolemia- better  Hypernatremia- improved  Cardio follow up re. afib noted   - Flomax added   - avoid potential nephrotoxins  - cont conley as per Urology   - Off IVF now

## 2022-06-03 LAB
GLUCOSE BLDC GLUCOMTR-MCNC: 120 MG/DL — HIGH (ref 70–99)
GLUCOSE BLDC GLUCOMTR-MCNC: 150 MG/DL — HIGH (ref 70–99)
GLUCOSE BLDC GLUCOMTR-MCNC: 201 MG/DL — HIGH (ref 70–99)
GLUCOSE BLDC GLUCOMTR-MCNC: 233 MG/DL — HIGH (ref 70–99)
GLUCOSE BLDC GLUCOMTR-MCNC: 248 MG/DL — HIGH (ref 70–99)
GLUCOSE BLDC GLUCOMTR-MCNC: 266 MG/DL — HIGH (ref 70–99)

## 2022-06-03 PROCEDURE — 36000 PLACE NEEDLE IN VEIN: CPT

## 2022-06-03 PROCEDURE — 99233 SBSQ HOSP IP/OBS HIGH 50: CPT

## 2022-06-03 RX ORDER — METOPROLOL TARTRATE 50 MG
5 TABLET ORAL ONCE
Refills: 0 | Status: COMPLETED | OUTPATIENT
Start: 2022-06-03 | End: 2022-06-03

## 2022-06-03 RX ORDER — METOPROLOL TARTRATE 50 MG
25 TABLET ORAL ONCE
Refills: 0 | Status: COMPLETED | OUTPATIENT
Start: 2022-06-03 | End: 2022-06-03

## 2022-06-03 RX ORDER — DILTIAZEM HCL 120 MG
10 CAPSULE, EXT RELEASE 24 HR ORAL ONCE
Refills: 0 | Status: COMPLETED | OUTPATIENT
Start: 2022-06-03 | End: 2022-06-03

## 2022-06-03 RX ADMIN — DORZOLAMIDE HYDROCHLORIDE TIMOLOL MALEATE 1 DROP(S): 20; 5 SOLUTION/ DROPS OPHTHALMIC at 08:41

## 2022-06-03 RX ADMIN — Medication 100 MILLIGRAM(S): at 21:59

## 2022-06-03 RX ADMIN — LIDOCAINE 1 PATCH: 4 CREAM TOPICAL at 13:11

## 2022-06-03 RX ADMIN — Medication 5 MILLIGRAM(S): at 06:18

## 2022-06-03 RX ADMIN — Medication 5 MILLIGRAM(S): at 04:30

## 2022-06-03 RX ADMIN — RIVAROXABAN 20 MILLIGRAM(S): KIT at 13:07

## 2022-06-03 RX ADMIN — DORZOLAMIDE HYDROCHLORIDE TIMOLOL MALEATE 1 DROP(S): 20; 5 SOLUTION/ DROPS OPHTHALMIC at 17:39

## 2022-06-03 RX ADMIN — TAMSULOSIN HYDROCHLORIDE 0.4 MILLIGRAM(S): 0.4 CAPSULE ORAL at 21:59

## 2022-06-03 RX ADMIN — Medication 2: at 17:39

## 2022-06-03 RX ADMIN — Medication 5 MILLIGRAM(S): at 14:23

## 2022-06-03 RX ADMIN — Medication 100 MILLIGRAM(S): at 13:07

## 2022-06-03 RX ADMIN — Medication 2: at 13:19

## 2022-06-03 RX ADMIN — Medication 10 MILLIGRAM(S): at 18:35

## 2022-06-03 RX ADMIN — INSULIN GLARGINE 8 UNIT(S): 100 INJECTION, SOLUTION SUBCUTANEOUS at 22:00

## 2022-06-03 RX ADMIN — Medication 25 MILLIGRAM(S): at 02:04

## 2022-06-03 NOTE — PROGRESS NOTE ADULT - SUBJECTIVE AND OBJECTIVE BOX
NEPHROLOGY INTERVAL HPI/OVERNIGHT EVENTS:    No new events.    MEDICATIONS  (STANDING):  dextrose 5%. 1000 milliLiter(s) (50 mL/Hr) IV Continuous <Continuous>  dextrose 5%. 1000 milliLiter(s) (100 mL/Hr) IV Continuous <Continuous>  dextrose 50% Injectable 25 Gram(s) IV Push once  dextrose 50% Injectable 12.5 Gram(s) IV Push once  dextrose 50% Injectable 25 Gram(s) IV Push once  dorzolamide 2%/timolol 0.5% Ophthalmic Solution 1 Drop(s) Both EYES two times a day  glucagon  Injectable 1 milliGRAM(s) IntraMuscular once  insulin glargine Injectable (LANTUS) 8 Unit(s) SubCutaneous at bedtime  insulin lispro (ADMELOG) corrective regimen sliding scale   SubCutaneous every 6 hours  lidocaine   4% Patch 1 Patch Transdermal daily  metoprolol tartrate 100 milliGRAM(s) Oral every 12 hours  rivaroxaban 20 milliGRAM(s) Oral daily  tamsulosin 0.4 milliGRAM(s) Oral at bedtime    MEDICATIONS  (PRN):  acetaminophen     Tablet .. 650 milliGRAM(s) Oral every 6 hours PRN Mild Pain (1 - 3)  dextrose Oral Gel 15 Gram(s) Oral once PRN Blood Glucose LESS THAN 70 milliGRAM(s)/deciliter  metoprolol tartrate Injectable 5 milliGRAM(s) IV Push every 6 hours PRN HR >120      Allergies    No Known Allergies            Vital Signs Last 24 Hrs  T(C): 37.2 (03 Jun 2022 10:15), Max: 37.2 (02 Jun 2022 19:45)  T(F): 98.9 (03 Jun 2022 10:15), Max: 98.9 (02 Jun 2022 19:45)  HR: 133 (03 Jun 2022 10:15) (72 - 145)  BP: 116/78 (03 Jun 2022 10:15) (100/73 - 116/78)  BP(mean): --  RR: 20 (03 Jun 2022 10:15) (17 - 20)  SpO2: 92% (03 Jun 2022 10:15) (92% - 99%)  T(C): 36.9 (02 Jun 2022 10:00), Max: 36.9 (01 Jun 2022 15:54)  T(F): 98.4 (02 Jun 2022 10:00), Max: 98.4 (01 Jun 2022 15:54)  HR: 96 (02 Jun 2022 10:00) (91 - 130)  BP: 100/73 (02 Jun 2022 12:00) (98/63 - 130/83)  BP(mean): --  RR: 18 (02 Jun 2022 12:00) (18 - 18)  SpO2: 98% (02 Jun 2022 12:00) (97% - 100%)        PHYSICAL EXAM:    GENERAL: Appears  comfortable in bed  HEAD:  Atraumatic, Normocephalic  NECK: Supple  NERVOUS SYSTEM:  awake in bed  CHEST/LUNG: Clear to percussion bilaterally  HEART: Regular rate and rhythm  ABDOMEN: Soft, Nontender, Nondistended; +BS  EXTREMITIES:  leg edema  markedly better, stasis  same  AZEEM Kaiser I&O noted    LABS:                            10.3   11.89 )-----------( 275      ( 02 Jun 2022 07:20 )             31.2     06-01    139  |  108<H>  |  21.5<H>  ----------------------------<  230<H>  4.0   |  21.0<L>  |  1.14    Ca    9.2      01 Jun 2022 07:15                  RADIOLOGY & ADDITIONAL TESTS:

## 2022-06-03 NOTE — PROGRESS NOTE ADULT - ASSESSMENT
89M with a history of hypertension, diabetes, gastric mass, and GERD who presented with a three day history of confusion and lethargy.       VINCE 2/2 urinary retention  Cr improving  c/w conley  continue to hold HCTZ-lisinopril  c/w tamsulosin    urinary retention likely BPH  no imaging done on admission  c/w tamsulosin  urology consult appreciated.  pt will need to be discharged on conley    Toxic metabolic encephalopathy   SLP following- rec soft bite sized diet with mildly thick liquid    A fib  HR uncontrolled  cardiology following. HR went back to 130s . on metoprolol 100mg BID. will defer management to cardiology      Diabetes - Insulin coverage,     Hypertension - Close blood pressure monitoring, meds on on hold.     HLD: will resume statins once ok to take oral meds.     Glaucoma - Continue with eye drops.    Neuropathy - For resumption of gabapentin if the patient is able to tolerate oral intake.      Overall prognosis guarded / poor. Palliative Care following.  DNR/DNI  DVT prophylaxis: Scds      Dispo: plan to discharge home when HR is controlled

## 2022-06-03 NOTE — CHART NOTE - NSCHARTNOTEFT_GEN_A_CORE
Source: Patient [  ]  Family [ ]   other [x ] EMR, nursing    Current Diet: Diet, Soft and Bite Sized (05-30-22 @ 12:32) [Active]  Diet, Soft and Bite Sized (05-30-22 @ 11:55) [Available for Activation]    PO intake:  < 50% [ x ]      Source for PO intake: observation    Current Weight: 145.9lbs (6/3)  145.5lbs (6/2)  155.2lbs (6/1)  152.7lbs (5/30)  149.2lbs (5/29)  143.3lbs (5/28)  151.6lbs (5/27)  (edema 1+ noted)    Pertinent Medications: MEDICATIONS  (STANDING):  dextrose 5%. 1000 milliLiter(s) (50 mL/Hr) IV Continuous <Continuous>  dextrose 5%. 1000 milliLiter(s) (100 mL/Hr) IV Continuous <Continuous>  dextrose 50% Injectable 25 Gram(s) IV Push once  dextrose 50% Injectable 12.5 Gram(s) IV Push once  dextrose 50% Injectable 25 Gram(s) IV Push once  dorzolamide 2%/timolol 0.5% Ophthalmic Solution 1 Drop(s) Both EYES two times a day  glucagon  Injectable 1 milliGRAM(s) IntraMuscular once  insulin glargine Injectable (LANTUS) 8 Unit(s) SubCutaneous at bedtime  insulin lispro (ADMELOG) corrective regimen sliding scale   SubCutaneous every 6 hours  lidocaine   4% Patch 1 Patch Transdermal daily  metoprolol tartrate 100 milliGRAM(s) Oral every 12 hours  rivaroxaban 20 milliGRAM(s) Oral daily  tamsulosin 0.4 milliGRAM(s) Oral at bedtime    MEDICATIONS  (PRN):  acetaminophen     Tablet .. 650 milliGRAM(s) Oral every 6 hours PRN Mild Pain (1 - 3)  dextrose Oral Gel 15 Gram(s) Oral once PRN Blood Glucose LESS THAN 70 milliGRAM(s)/deciliter  metoprolol tartrate Injectable 5 milliGRAM(s) IV Push every 6 hours PRN HR >120    Pertinent Labs: CBC Full  -  ( 02 Jun 2022 07:20 )  WBC Count : 11.89 K/uL  RBC Count : 3.70 M/uL  Hemoglobin : 10.3 g/dL  Hematocrit : 31.2 %  Platelet Count - Automated : 275 K/uL  Mean Cell Volume : 84.3 fl  Mean Cell Hemoglobin : 27.8 pg  Mean Cell Hemoglobin Concentration : 33.0 gm/dL      Skin: no impairments noted  Edema: 1+ edema scrotum and R ankle      Nutrition focused physical exam conducted   - found signs of malnutrition [ ]absent [ x ]present    Subcutaneous fat loss: [x ] Orbital fat pads region, [ ]Buccal fat region, [ ]Triceps region,  [ ]Ribs region    Muscle wasting: [x ]Temples region, [x ]Clavicle region, [x ]Shoulder region, [ ]Scapula region, [ ]Interosseous region,  [ ]thigh region, [ ]Calf region    Estimated Needs:   [ x ] no change since previous assessment  [ ] recalculated:     Current Nutrition Diagnosis: Patient remains at nutrition risk secondary to moderate on chronic malnutrition related to related to inability to consume sufficient protein energy intake with confusion/lethargy PTA, +dysphagia as evidenced by pt meeting <75% est energy intake >7 days and mild muscle wasting/fat loss.    Patient sleeping at interview. Per nursing and documentation, patient with consistent lethargy and is a poor historian. Observed PO intake of Soft and Bite Sized breakfast about ~25%. Previous assessment reports intake is fair. Per SLP eval, patient would benefit from current diet with MILDLY thickened liquids. RD to followup prn.      Recommendations:   1. Diet change to MILDLY thickened liquids with Soft and Bite Sized consistency.  2. Glucerna TID (660kcal,30gpro) to assist with meeting nutrition needs in the setting of poor PO intake  3. MVI daily  4. Monitor PO intake, weights, nutrition related labs    Monitoring and Evaluation:   [ x] PO intake [ x] Tolerance to diet prescription [X] Weights  [X] Follow up per protocol [X] Labs:

## 2022-06-03 NOTE — PROGRESS NOTE ADULT - SUBJECTIVE AND OBJECTIVE BOX
[facility] Division of Hospital Medicine    Chief Complaint:      SUBJECTIVE / OVERNIGHT EVENTS: pt tachycardic again this am in 130s. not in acute distress        MEDICATIONS  (STANDING):  aspirin enteric coated 81 milliGRAM(s) Oral daily  dextrose 5%. 1000 milliLiter(s) (50 mL/Hr) IV Continuous <Continuous>  dextrose 5%. 1000 milliLiter(s) (100 mL/Hr) IV Continuous <Continuous>  dextrose 50% Injectable 25 Gram(s) IV Push once  dextrose 50% Injectable 12.5 Gram(s) IV Push once  dextrose 50% Injectable 25 Gram(s) IV Push once  diltiazem Infusion 10 mG/Hr (10 mL/Hr) IV Continuous <Continuous>  dorzolamide 2%/timolol 0.5% Ophthalmic Solution 1 Drop(s) Both EYES two times a day  glucagon  Injectable 1 milliGRAM(s) IntraMuscular once  insulin lispro (ADMELOG) corrective regimen sliding scale   SubCutaneous every 6 hours  lidocaine   4% Patch 1 Patch Transdermal daily  metoprolol tartrate 25 milliGRAM(s) Oral every 6 hours  sodium chloride 0.45%. 1000 milliLiter(s) (100 mL/Hr) IV Continuous <Continuous>    MEDICATIONS  (PRN):  dextrose Oral Gel 15 Gram(s) Oral once PRN Blood Glucose LESS THAN 70 milliGRAM(s)/deciliter  metoprolol tartrate Injectable 5 milliGRAM(s) IV Push every 6 hours PRN HR >120        I&O's Summary    30 May 2022 07:01  -  31 May 2022 07:00  --------------------------------------------------------  IN: 2345 mL / OUT: 2300 mL / NET: 45 mL    31 May 2022 07:01  -  31 May 2022 14:09  --------------------------------------------------------  IN: 675 mL / OUT: 0 mL / NET: 675 mL        PHYSICAL EXAM:  Vital Signs Last 24 Hrs  T(C): 36.7 (31 May 2022 13:24), Max: 37.2 (30 May 2022 22:49)  T(F): 98 (31 May 2022 13:24), Max: 98.9 (30 May 2022 22:49)  HR: 134 (31 May 2022 13:24) (84 - 138)  BP: 151/111 (31 May 2022 13:24) (108/74 - 151/121)  BP(mean): --  RR: 18 (31 May 2022 13:24) (18 - 18)  SpO2: 100% (31 May 2022 13:24) (97% - 100%)        CONSTITUTIONAL: NAD,  ENMT: Moist oral mucosa, no pharyngeal injection or exudates; normal dentition  RESPIRATORY: Normal respiratory effort; lungs are clear to auscultation bilaterally  CARDIOVASCULAR: tachycardia normal S1 and S2, no murmur/rub/gallop; No lower extremity edema; Peripheral pulses are 2+ bilaterally  ABDOMEN: Nontender to palpation, normoactive bowel sounds, no rebound/guarding; No hepatosplenomegaly  MUSCLOSKELETAL:  Normal gait; no clubbing or cyanosis of digits; no joint swelling or tenderness to palpation  PSYCH: A+O to person, place, and time; affect appropriate  NEUROLOGY: CN 2-12 are intact and symmetric;   SKIN: No rashes; no palpable lesions  : conley in place    LABS:                        12.3   8.99  )-----------( 325      ( 31 May 2022 13:01 )             38.1     05-31    138  |  103  |  26.2<H>  ----------------------------<  239<H>  4.4   |  22.0  |  1.25    Ca    9.8      31 May 2022 13:01  Phos  1.9     05-31  Mg     1.7     05-31    TPro  7.3  /  Alb  3.6  /  TBili  0.9  /  DBili  x   /  AST  13  /  ALT  14  /  AlkPhos  55  05-31              CAPILLARY BLOOD GLUCOSE      POCT Blood Glucose.: 230 mg/dL (31 May 2022 12:03)  POCT Blood Glucose.: 215 mg/dL (31 May 2022 05:44)  POCT Blood Glucose.: 182 mg/dL (30 May 2022 22:57)  POCT Blood Glucose.: 328 mg/dL (30 May 2022 16:52)        RADIOLOGY & ADDITIONAL TESTS:  Results Reviewed:   Imaging Personally Reviewed:  Electrocardiogram Personally Reviewed:

## 2022-06-03 NOTE — CHART NOTE - NSCHARTNOTEFT_GEN_A_CORE
Medicine PA- Pt had no IV initially after multiple attempts and a PO lopressor 25mg was given for tachy 130's in the interim, IV was then obtained by ALINA giles/ kristin and 5mg IVP was given x2.    Vital Signs Last 24 Hrs  T(C): 37.1 (03 Jun 2022 04:40), Max: 37.2 (02 Jun 2022 19:45)  T(F): 98.7 (03 Jun 2022 04:40), Max: 98.9 (02 Jun 2022 19:45)  HR: 133 (03 Jun 2022 04:40) (72 - 145)  BP: 113/75 (03 Jun 2022 04:40) (100/73 - 113/75)  BP(mean): --  RR: 17 (03 Jun 2022 04:40) (17 - 18)  SpO2: 96% (03 Jun 2022 04:40) (96% - 100%)    -continue to monitor  -day team to f/u

## 2022-06-04 LAB
ANION GAP SERPL CALC-SCNC: 8 MMOL/L — SIGNIFICANT CHANGE UP (ref 5–17)
BUN SERPL-MCNC: 18.8 MG/DL — SIGNIFICANT CHANGE UP (ref 8–20)
CALCIUM SERPL-MCNC: 9.5 MG/DL — SIGNIFICANT CHANGE UP (ref 8.6–10.2)
CHLORIDE SERPL-SCNC: 106 MMOL/L — SIGNIFICANT CHANGE UP (ref 98–107)
CO2 SERPL-SCNC: 21 MMOL/L — LOW (ref 22–29)
CREAT SERPL-MCNC: 1.24 MG/DL — SIGNIFICANT CHANGE UP (ref 0.5–1.3)
EGFR: 52 ML/MIN/1.73M2 — LOW
GLUCOSE BLDC GLUCOMTR-MCNC: 108 MG/DL — HIGH (ref 70–99)
GLUCOSE BLDC GLUCOMTR-MCNC: 178 MG/DL — HIGH (ref 70–99)
GLUCOSE BLDC GLUCOMTR-MCNC: 196 MG/DL — HIGH (ref 70–99)
GLUCOSE BLDC GLUCOMTR-MCNC: 211 MG/DL — HIGH (ref 70–99)
GLUCOSE BLDC GLUCOMTR-MCNC: 324 MG/DL — HIGH (ref 70–99)
GLUCOSE BLDC GLUCOMTR-MCNC: 333 MG/DL — HIGH (ref 70–99)
GLUCOSE SERPL-MCNC: 209 MG/DL — HIGH (ref 70–99)
HCT VFR BLD CALC: 30.1 % — LOW (ref 39–50)
HGB BLD-MCNC: 9.7 G/DL — LOW (ref 13–17)
MAGNESIUM SERPL-MCNC: 1.8 MG/DL — SIGNIFICANT CHANGE UP (ref 1.6–2.6)
MCHC RBC-ENTMCNC: 27.9 PG — SIGNIFICANT CHANGE UP (ref 27–34)
MCHC RBC-ENTMCNC: 32.2 GM/DL — SIGNIFICANT CHANGE UP (ref 32–36)
MCV RBC AUTO: 86.5 FL — SIGNIFICANT CHANGE UP (ref 80–100)
PHOSPHATE SERPL-MCNC: 1.7 MG/DL — LOW (ref 2.4–4.7)
PLATELET # BLD AUTO: 220 K/UL — SIGNIFICANT CHANGE UP (ref 150–400)
POTASSIUM SERPL-MCNC: 5 MMOL/L — SIGNIFICANT CHANGE UP (ref 3.5–5.3)
POTASSIUM SERPL-SCNC: 5 MMOL/L — SIGNIFICANT CHANGE UP (ref 3.5–5.3)
RBC # BLD: 3.48 M/UL — LOW (ref 4.2–5.8)
RBC # FLD: 12.5 % — SIGNIFICANT CHANGE UP (ref 10.3–14.5)
SODIUM SERPL-SCNC: 135 MMOL/L — SIGNIFICANT CHANGE UP (ref 135–145)
WBC # BLD: 9.26 K/UL — SIGNIFICANT CHANGE UP (ref 3.8–10.5)
WBC # FLD AUTO: 9.26 K/UL — SIGNIFICANT CHANGE UP (ref 3.8–10.5)

## 2022-06-04 PROCEDURE — 99233 SBSQ HOSP IP/OBS HIGH 50: CPT

## 2022-06-04 PROCEDURE — 99223 1ST HOSP IP/OBS HIGH 75: CPT

## 2022-06-04 PROCEDURE — 74018 RADEX ABDOMEN 1 VIEW: CPT | Mod: 26

## 2022-06-04 PROCEDURE — 76937 US GUIDE VASCULAR ACCESS: CPT | Mod: 26

## 2022-06-04 PROCEDURE — 36000 PLACE NEEDLE IN VEIN: CPT

## 2022-06-04 RX ORDER — ASPIRIN/CALCIUM CARB/MAGNESIUM 324 MG
81 TABLET ORAL DAILY
Refills: 0 | Status: DISCONTINUED | OUTPATIENT
Start: 2022-06-04 | End: 2022-06-07

## 2022-06-04 RX ORDER — PANTOPRAZOLE SODIUM 20 MG/1
40 TABLET, DELAYED RELEASE ORAL
Refills: 0 | Status: DISCONTINUED | OUTPATIENT
Start: 2022-06-04 | End: 2022-06-07

## 2022-06-04 RX ORDER — SODIUM CHLORIDE 9 MG/ML
250 INJECTION INTRAMUSCULAR; INTRAVENOUS; SUBCUTANEOUS ONCE
Refills: 0 | Status: COMPLETED | OUTPATIENT
Start: 2022-06-04 | End: 2022-06-07

## 2022-06-04 RX ORDER — GABAPENTIN 400 MG/1
100 CAPSULE ORAL
Refills: 0 | Status: DISCONTINUED | OUTPATIENT
Start: 2022-06-04 | End: 2022-06-07

## 2022-06-04 RX ORDER — SIMVASTATIN 20 MG/1
40 TABLET, FILM COATED ORAL AT BEDTIME
Refills: 0 | Status: DISCONTINUED | OUTPATIENT
Start: 2022-06-04 | End: 2022-06-07

## 2022-06-04 RX ORDER — DILTIAZEM HCL 120 MG
30 CAPSULE, EXT RELEASE 24 HR ORAL EVERY 8 HOURS
Refills: 0 | Status: DISCONTINUED | OUTPATIENT
Start: 2022-06-04 | End: 2022-06-05

## 2022-06-04 RX ORDER — CALCIUM ACETATE 667 MG
667 TABLET ORAL
Refills: 0 | Status: DISCONTINUED | OUTPATIENT
Start: 2022-06-04 | End: 2022-06-07

## 2022-06-04 RX ADMIN — LIDOCAINE 1 PATCH: 4 CREAM TOPICAL at 23:17

## 2022-06-04 RX ADMIN — Medication 81 MILLIGRAM(S): at 13:45

## 2022-06-04 RX ADMIN — Medication 100 MILLIGRAM(S): at 12:22

## 2022-06-04 RX ADMIN — LIDOCAINE 1 PATCH: 4 CREAM TOPICAL at 01:00

## 2022-06-04 RX ADMIN — LIDOCAINE 1 PATCH: 4 CREAM TOPICAL at 11:25

## 2022-06-04 RX ADMIN — Medication 650 MILLIGRAM(S): at 11:54

## 2022-06-04 RX ADMIN — Medication 667 MILLIGRAM(S): at 22:34

## 2022-06-04 RX ADMIN — Medication 667 MILLIGRAM(S): at 18:13

## 2022-06-04 RX ADMIN — Medication 2: at 13:05

## 2022-06-04 RX ADMIN — Medication 5 MILLIGRAM(S): at 11:25

## 2022-06-04 RX ADMIN — Medication 100 MILLIGRAM(S): at 22:34

## 2022-06-04 RX ADMIN — GABAPENTIN 100 MILLIGRAM(S): 400 CAPSULE ORAL at 18:13

## 2022-06-04 RX ADMIN — RIVAROXABAN 20 MILLIGRAM(S): KIT at 11:25

## 2022-06-04 RX ADMIN — Medication 1: at 18:12

## 2022-06-04 RX ADMIN — Medication 30 MILLIGRAM(S): at 22:34

## 2022-06-04 RX ADMIN — Medication 30 MILLIGRAM(S): at 13:45

## 2022-06-04 RX ADMIN — Medication 5 MILLIGRAM(S): at 00:30

## 2022-06-04 RX ADMIN — Medication 650 MILLIGRAM(S): at 11:24

## 2022-06-04 RX ADMIN — DORZOLAMIDE HYDROCHLORIDE TIMOLOL MALEATE 1 DROP(S): 20; 5 SOLUTION/ DROPS OPHTHALMIC at 18:14

## 2022-06-04 RX ADMIN — Medication 4: at 00:30

## 2022-06-04 RX ADMIN — TAMSULOSIN HYDROCHLORIDE 0.4 MILLIGRAM(S): 0.4 CAPSULE ORAL at 22:35

## 2022-06-04 RX ADMIN — SIMVASTATIN 40 MILLIGRAM(S): 20 TABLET, FILM COATED ORAL at 22:34

## 2022-06-04 NOTE — PROCEDURE NOTE - NSPOSTCAREGUIDE_GEN_A_CORE
Verbal/written post procedure instructions were given to patient/caregiver/Instructed patient/caregiver to follow-up with primary care physician/Instructed patient/caregiver regarding signs and symptoms of infection/Keep the cast/splint/dressing clean and dry/Care for catheter as per unit/ICU protocols
Instructed patient/caregiver regarding signs and symptoms of infection
Verbal/written post procedure instructions were given to patient/caregiver/Instructed patient/caregiver regarding signs and symptoms of infection/Keep the cast/splint/dressing clean and dry/Care for catheter as per unit/ICU protocols

## 2022-06-04 NOTE — PROCEDURE NOTE - ADDITIONAL PROCEDURE DETAILS
US Guided 18g IV placed in left upper arm. +flash, good blood return, flushes easily. Patient tolerated procedure well with no immediate complications. Tourniquet removed, all sharps disposed of appropriately, bed rails raised and bed lowered.
ProspectNow Powermidline 4fr. 10cm midline catheter inserted [L] basilic vein. Good flash, easily flushes, sharps disposed of. Tourniquet removed. 27.5cm Cir.

## 2022-06-04 NOTE — PROGRESS NOTE ADULT - ASSESSMENT
c89M history as listed significant for Advanced dementia, HTN, Afib (not on anticoagulation) admitted with urinary retention/VINCE 133/8.3 and hyperkalemia, noted Afib RVR 150s overnight, unable to take oral meds pending speech/swallow started on IV diltiazem gtt.  Patient now eating, drinking and taking his pills.  On a soft diet.  Overnight had to increase IV Cardizem as heart rate was mostly in the 130s.   Not rate controlled in the 90's.

## 2022-06-04 NOTE — PROGRESS NOTE ADULT - ASSESSMENT
89M with a history of hypertension, diabetes, gastric mass, and GERD who presented with a three day history of confusion and lethargy.     A fib rvr  HR uncontrolled 135's am  Spoke with cardiology team,plan to start Cardizem gtt. s/p iv metoprolol. . on metoprolol 100mg BID. will defer management to cardiology  Xarelto       VINCE 2/2 urinary retention likely from BPH  Cr improving  c/w conley  continue to hold HCTZ-lisinopril  c/w tamsulosin  urology consult appreciated.  pt will need to be discharged on conley  f/u bmp      Metabolic encephalopathy   monitor mental status  ? underlying dementia     Dysphagia  SLP following- rec soft bite sized diet with mildly thick liquid      Diabetes - Insulin coverage,     Hypertension  Monitor bp.stable  continue bb    HLD:  statins     Glaucoma    Continue with eye drops.    Neuropathy  gabapentin       Overall prognosis guarded / poor. Palliative Care following.  DNR/DNI  DVT prophylaxis: Scds      Dispo: plan to discharge home when HR is controlled

## 2022-06-04 NOTE — PROCEDURE NOTE - NSICDXPROCEDURE_GEN_ALL_CORE_FT
PROCEDURES:  Midline catheter insertion 04-Jun-2022 12:56:28  Stevan López  Ultrasound guided venous access 04-Jun-2022 12:56:41  Stevan López

## 2022-06-04 NOTE — PROGRESS NOTE ADULT - SUBJECTIVE AND OBJECTIVE BOX
[facility]                                                         CARDIOLOGY AT [facility]                                                                  [address] [address]                                                         Telephone: [phone]. Fax:[phone]                                                                             PROGRESS NOTE    Reason for follow up: Atrial fib  Update: called to see patient with atrial fib at 130      Review of symptoms:   unable to participate in interview    Vitals:  T(C): 36.9 (06-04-22 @ 09:55), Max: 37.2 (06-03-22 @ 13:07)  HR: 136 (06-04-22 @ 09:55) (133 - 136)  BP: 118/83 (06-04-22 @ 09:55) (105/71 - 118/83)  RR: 18 (06-04-22 @ 09:55) (18 - 18)  SpO2: 100% (06-04-22 @ 09:55) (95% - 100%)  Wt(kg): --  I&O's Summary    03 Jun 2022 07:01  -  04 Jun 2022 07:00  --------------------------------------------------------  IN: 420 mL / OUT: 725 mL / NET: -305 mL      PHYSICAL EXAM:  Appearance: Comfortable. No acute distress  HEENT:  Atraumatic. Normocephalic.  Normal oral mucosa  Neurologic: alert non verbal  Cardiovascular: RRR S1 S2, irregular  Respiratory: Lungs clear to auscultation, unlabored   Gastrointestinal:  Soft, Non-tender, + BS  Lower Extremities: No edema  Psychiatry: Patient is calm. No agitation.   Skin: warm and dry.      CURRENT MEDICATIONS:  MEDICATIONS  (STANDING):  dorzolamide 2%/timolol 0.5% Ophthalmic Solution 1 Drop(s) Both EYES two times a day  glucagon  Injectable 1 milliGRAM(s) IntraMuscular once  insulin glargine Injectable (LANTUS) 8 Unit(s) SubCutaneous at bedtime  insulin lispro (ADMELOG) corrective regimen sliding scale   SubCutaneous every 6 hours  lidocaine   4% Patch 1 Patch Transdermal daily  metoprolol tartrate 100 milliGRAM(s) Oral every 12 hours  rivaroxaban 20 milliGRAM(s) Oral daily  tamsulosin 0.4 milliGRAM(s) Oral at bedtime    < from: TTE Echo Complete w/o Contrast w/ Doppler (05.30.22 @ 13:11) >   1. Technically difficult study.   2. Due to rapid Afib overall left ventricular ejection fraction   difficult to accurately assess, but grossly by visual estimation probably   is 50 to 55%.   3. The mitral in-flow pattern reveals no discernable A-wave, therefore   no comment on diastolic function can be made.   4. Normal right ventricular size and function, estimated PASP least 20   mmHg.   5. Mildly enlarged left atrium.   6. Mild mitral annular calcification.   7. Mild mitral valve regurgitation.   8. Mild tricuspid regurgitation.   9. Sclerotic aortic valve with normal opening.  10. There is no evidence of pericardial effusion.    TSH: Thyroid Stimulating Hormone, Serum: 1.20 uIU/mL    TELEMETRY: Atrial fib with rates 130

## 2022-06-04 NOTE — PROGRESS NOTE ADULT - SUBJECTIVE AND OBJECTIVE BOX
Patient is a 89y old  Male who presents with a chief complaint of Acute renal failure- hyperkalemia- hypoglycemia (03 Jun 2022 10:49)    pt seen and exm. awake, oriented to name. not answering appropiately  REVIEW OF SYSTEMS: unable -ams    MEDICATIONS  (STANDING):  dextrose 5%. 1000 milliLiter(s) (50 mL/Hr) IV Continuous <Continuous>  dextrose 5%. 1000 milliLiter(s) (100 mL/Hr) IV Continuous <Continuous>  dextrose 50% Injectable 25 Gram(s) IV Push once  dextrose 50% Injectable 12.5 Gram(s) IV Push once  dextrose 50% Injectable 25 Gram(s) IV Push once  dorzolamide 2%/timolol 0.5% Ophthalmic Solution 1 Drop(s) Both EYES two times a day  glucagon  Injectable 1 milliGRAM(s) IntraMuscular once  insulin glargine Injectable (LANTUS) 8 Unit(s) SubCutaneous at bedtime  insulin lispro (ADMELOG) corrective regimen sliding scale   SubCutaneous every 6 hours  lidocaine   4% Patch 1 Patch Transdermal daily  metoprolol tartrate 100 milliGRAM(s) Oral every 12 hours  rivaroxaban 20 milliGRAM(s) Oral daily  tamsulosin 0.4 milliGRAM(s) Oral at bedtime    MEDICATIONS  (PRN):  acetaminophen     Tablet .. 650 milliGRAM(s) Oral every 6 hours PRN Mild Pain (1 - 3)  dextrose Oral Gel 15 Gram(s) Oral once PRN Blood Glucose LESS THAN 70 milliGRAM(s)/deciliter  metoprolol tartrate Injectable 5 milliGRAM(s) IV Push every 6 hours PRN HR >120      CAPILLARY BLOOD GLUCOSE      POCT Blood Glucose.: 108 mg/dL (04 Jun 2022 08:05)  POCT Blood Glucose.: 324 mg/dL (04 Jun 2022 00:27)  POCT Blood Glucose.: 333 mg/dL (04 Jun 2022 00:25)  POCT Blood Glucose.: 201 mg/dL (03 Jun 2022 21:43)  POCT Blood Glucose.: 248 mg/dL (03 Jun 2022 17:11)  POCT Blood Glucose.: 233 mg/dL (03 Jun 2022 13:15)  POCT Blood Glucose.: 266 mg/dL (03 Jun 2022 12:06)    I&O's Summary    03 Jun 2022 07:01  -  04 Jun 2022 07:00  --------------------------------------------------------  IN: 420 mL / OUT: 725 mL / NET: -305 mL        PHYSICAL EXAM:  Vital Signs Last 24 Hrs  T(C): 36.9 (04 Jun 2022 09:55), Max: 37.2 (03 Jun 2022 13:07)  T(F): 98.4 (04 Jun 2022 09:55), Max: 98.9 (03 Jun 2022 13:07)  HR: 136 (04 Jun 2022 09:55) (133 - 136)  BP: 118/83 (04 Jun 2022 09:55) (105/71 - 118/83)  BP(mean): --  RR: 18 (04 Jun 2022 09:55) (18 - 18)  SpO2: 100% (04 Jun 2022 09:55) (95% - 100%)    CONSTITUTIONAL: NAD,  EYES: PERRLA; conjunctiva and sclera clear  ENMT: Moist oral mucosa,   RESPIRATORY: Normal respiratory effort; lungs are clear to auscultation bilaterally  CARDIOVASCULAR:IRR, normal S1 and S2, no murmur   EXTS: No lower extremity edema; Peripheral pulses are 2+ bilaterally,STATIC Changes  ABDOMEN: Nontender to palpation, normoactive bowel sounds, no rebound/guarding;   MUSCLOSKELETAL:   no joint swelling or tenderness to palpation  PSYCH: non coop am  NEUROLOGY: A+O to person, seems confused, not answering-GETTING ANGRY.+M/S      LABS:                      RADIOLOGY & ADDITIONAL TESTS:  Results Reviewed:

## 2022-06-04 NOTE — PROGRESS NOTE ADULT - NS ATTEND AMEND GEN_ALL_CORE FT
echo EF normal  on metoprolol 100 tid  would add cardizem 30 tid  xarelto for anticoaguation-  unclear why atrial fibrillation not controlled .  suspect urinary retention. has folety.   Bladdfer scan.  pain controll. Shimoni[pation. last bowel movement many days ago.

## 2022-06-04 NOTE — PROCEDURE NOTE - NSPROCDETAILS_GEN_ALL_CORE
location identified, draped/prepped, sterile technique used/blood seen on insertion/dressing applied/flushes easily/secured in place/sterile technique, catheter placed
location identified, draped/prepped, sterile technique used/sterile dressing applied/sterile technique, catheter placed/ultrasound guidance
location identified, draped/prepped, sterile technique used/blood seen on insertion/dressing applied/flushes easily/secured in place/sterile technique, catheter placed

## 2022-06-05 LAB
ANION GAP SERPL CALC-SCNC: 11 MMOL/L — SIGNIFICANT CHANGE UP (ref 5–17)
BUN SERPL-MCNC: 19.5 MG/DL — SIGNIFICANT CHANGE UP (ref 8–20)
CALCIUM SERPL-MCNC: 10 MG/DL — SIGNIFICANT CHANGE UP (ref 8.6–10.2)
CHLORIDE SERPL-SCNC: 105 MMOL/L — SIGNIFICANT CHANGE UP (ref 98–107)
CO2 SERPL-SCNC: 18 MMOL/L — LOW (ref 22–29)
CREAT SERPL-MCNC: 1.26 MG/DL — SIGNIFICANT CHANGE UP (ref 0.5–1.3)
EGFR: 51 ML/MIN/1.73M2 — LOW
GLUCOSE BLDC GLUCOMTR-MCNC: 150 MG/DL — HIGH (ref 70–99)
GLUCOSE BLDC GLUCOMTR-MCNC: 239 MG/DL — HIGH (ref 70–99)
GLUCOSE BLDC GLUCOMTR-MCNC: 242 MG/DL — HIGH (ref 70–99)
GLUCOSE BLDC GLUCOMTR-MCNC: 265 MG/DL — HIGH (ref 70–99)
GLUCOSE BLDC GLUCOMTR-MCNC: 305 MG/DL — HIGH (ref 70–99)
GLUCOSE SERPL-MCNC: 174 MG/DL — HIGH (ref 70–99)
POTASSIUM SERPL-MCNC: 5 MMOL/L — SIGNIFICANT CHANGE UP (ref 3.5–5.3)
POTASSIUM SERPL-SCNC: 5 MMOL/L — SIGNIFICANT CHANGE UP (ref 3.5–5.3)
SODIUM SERPL-SCNC: 134 MMOL/L — LOW (ref 135–145)

## 2022-06-05 PROCEDURE — 99233 SBSQ HOSP IP/OBS HIGH 50: CPT

## 2022-06-05 RX ORDER — DILTIAZEM HCL 120 MG
60 CAPSULE, EXT RELEASE 24 HR ORAL EVERY 8 HOURS
Refills: 0 | Status: DISCONTINUED | OUTPATIENT
Start: 2022-06-05 | End: 2022-06-07

## 2022-06-05 RX ORDER — SODIUM,POTASSIUM PHOSPHATES 278-250MG
1 POWDER IN PACKET (EA) ORAL
Refills: 0 | Status: DISCONTINUED | OUTPATIENT
Start: 2022-06-05 | End: 2022-06-07

## 2022-06-05 RX ADMIN — Medication 667 MILLIGRAM(S): at 15:24

## 2022-06-05 RX ADMIN — INSULIN GLARGINE 8 UNIT(S): 100 INJECTION, SOLUTION SUBCUTANEOUS at 00:40

## 2022-06-05 RX ADMIN — Medication 3: at 18:27

## 2022-06-05 RX ADMIN — Medication 60 MILLIGRAM(S): at 15:41

## 2022-06-05 RX ADMIN — PANTOPRAZOLE SODIUM 40 MILLIGRAM(S): 20 TABLET, DELAYED RELEASE ORAL at 05:39

## 2022-06-05 RX ADMIN — Medication 650 MILLIGRAM(S): at 18:33

## 2022-06-05 RX ADMIN — GABAPENTIN 100 MILLIGRAM(S): 400 CAPSULE ORAL at 05:37

## 2022-06-05 RX ADMIN — Medication 100 MILLIGRAM(S): at 15:27

## 2022-06-05 RX ADMIN — Medication 4: at 15:26

## 2022-06-05 RX ADMIN — GABAPENTIN 100 MILLIGRAM(S): 400 CAPSULE ORAL at 18:31

## 2022-06-05 RX ADMIN — Medication 5 MILLIGRAM(S): at 10:04

## 2022-06-05 RX ADMIN — Medication 667 MILLIGRAM(S): at 18:24

## 2022-06-05 RX ADMIN — Medication 81 MILLIGRAM(S): at 15:25

## 2022-06-05 RX ADMIN — TAMSULOSIN HYDROCHLORIDE 0.4 MILLIGRAM(S): 0.4 CAPSULE ORAL at 21:34

## 2022-06-05 RX ADMIN — DORZOLAMIDE HYDROCHLORIDE TIMOLOL MALEATE 1 DROP(S): 20; 5 SOLUTION/ DROPS OPHTHALMIC at 05:38

## 2022-06-05 RX ADMIN — Medication 60 MILLIGRAM(S): at 21:34

## 2022-06-05 RX ADMIN — Medication 2: at 09:52

## 2022-06-05 RX ADMIN — RIVAROXABAN 20 MILLIGRAM(S): KIT at 15:25

## 2022-06-05 RX ADMIN — Medication 1 PACKET(S): at 15:25

## 2022-06-05 RX ADMIN — INSULIN GLARGINE 8 UNIT(S): 100 INJECTION, SOLUTION SUBCUTANEOUS at 21:34

## 2022-06-05 RX ADMIN — Medication 667 MILLIGRAM(S): at 21:34

## 2022-06-05 RX ADMIN — Medication 30 MILLIGRAM(S): at 05:37

## 2022-06-05 RX ADMIN — SIMVASTATIN 40 MILLIGRAM(S): 20 TABLET, FILM COATED ORAL at 21:34

## 2022-06-05 RX ADMIN — LIDOCAINE 1 PATCH: 4 CREAM TOPICAL at 15:40

## 2022-06-05 RX ADMIN — DORZOLAMIDE HYDROCHLORIDE TIMOLOL MALEATE 1 DROP(S): 20; 5 SOLUTION/ DROPS OPHTHALMIC at 18:28

## 2022-06-05 NOTE — PROGRESS NOTE ADULT - NS ATTEND AMEND GEN_ALL_CORE FT
Urinary retention: s/p [facility].     heart rate improved. on cardizem. increase cardizem. ct beta-blocker   d/c telemetry  discharge plan .  heart rate goal 100. Urinary retention: s/p [facility].        heart rate improved. on cardizem. increase cardizem. ct beta-blocker   d/c telemetry  discharge plan .  heart rate goal 100.

## 2022-06-05 NOTE — PROGRESS NOTE ADULT - PROBLEM SELECTOR PLAN 1
echo EF normal  on metoprolol 100 tid  On Cardizem 30 tid - titrate as needed for rate control.    Xarelto for anticoagulation echo EF normal  on metoprolol 100 tid  On Cardizem 30 tid - titrate as needed for rate control.    Xarelto for anticoagulation  d.c telemetry

## 2022-06-05 NOTE — PROGRESS NOTE ADULT - ASSESSMENT
89M with a history of hypertension, diabetes, gastric mass, and GERD who presented with a three day history of confusion and lethargy.     A fib rvr  HRstill high  Spoke with cardiology team,plan to dc Cardizem gtt. increase metoprolol 100mg TID,ADD po cardizem. f/u  cardiology rec  Xarelto   monitor lytes and replaced      VINCE 2/2 urinary retention likely from BPH  Cr improving  c/w conley  continue to hold HCTZ-lisinopril  c/w tamsulosin  urology consult appreciated.  pt will need to be discharged on conley  f/u bmp      Metabolic encephalopathy   monitor mental status  ? underlying dementia     Dysphagia  SLP following- rec soft bite sized diet with mildly thick liquid      Diabetes - Insulin coverage,     Hypertension  Monitor bp.stable  continue bb    HLD:  statins     Glaucoma    Continue with eye drops.    Neuropathy  gabapentin       Overall prognosis guarded / poor. Palliative Care following.  DNR/DNI  DVT prophylaxis: Scds      Dispo: plan to discharge home when HR is controlled

## 2022-06-05 NOTE — PROGRESS NOTE ADULT - SUBJECTIVE AND OBJECTIVE BOX
[facility]                                                         CARDIOLOGY AT [facility]                                                                  [address] [facility], [place]-[address] [place] [address]                                                         Telephone: [phone]. Fax:[phone]                                                                             PROGRESS NOTE    Reason for follow up:   Afib  Update:   added Cardizem 30mg q6.  Rates continue to be elevated up to 130's, titrate Cardizem as needed for rate control    Review of symptoms:   Cardiac:  No chest pain. No dyspnea. No palpitations.  Respiratory: no cough. No dyspnea  Gastrointestinal: No diarrhea. No abdominal pain. No bleeding.   Neuro: No focal neuro complaints.    Vitals:  T(C): 36.5 (06-05-22 @ 10:01), Max: 36.5 (06-05-22 @ 05:35)  HR: 128 (06-05-22 @ 10:01) (90 - 136)  BP: 118/66 (06-05-22 @ 10:01) (95/63 - 119/79)  RR: 18 (06-05-22 @ 10:01) (16 - 18)  SpO2: 100% (06-05-22 @ 10:01) (100% - 100%)    I&O's Summary    04 Jun 2022 07:01  -  05 Jun 2022 07:00  --------------------------------------------------------  IN: 300 mL / OUT: 1000 mL / NET: -700 mL      PHYSICAL EXAM:  Appearance: Comfortable. No acute distress  HEENT:  Atraumatic. Normocephalic.  Normal oral mucosa  Neurologic: A & O x 3, no gross focal deficits.  Cardiovascular: Irregular, tachy S1 S2, No murmur, no rubs/gallops. No JVD  Respiratory: Lungs clear to auscultation, unlabored   Gastrointestinal:  Soft, Non-tender, + BS  Lower Extremities: 2+ Peripheral Pulses, No clubbing, cyanosis, or edema  Psychiatry: Patient is calm. No agitation.   Skin: warm and dry.    CURRENT CARDIAC MEDICATIONS:  diltiazem    Tablet 30 milliGRAM(s) Oral every 8 hours  metoprolol tartrate 100 milliGRAM(s) Oral every 12 hours  metoprolol tartrate Injectable 5 milliGRAM(s) IV Push every 6 hours PRN  tamsulosin 0.4 milliGRAM(s) Oral at bedtime    CURRENT OTHER MEDICATIONS:  acetaminophen     Tablet .. 650 milliGRAM(s) Oral every 6 hours PRN Mild Pain (1 - 3)  gabapentin 100 milliGRAM(s) Oral two times a day  pantoprazole    Tablet 40 milliGRAM(s) Oral before breakfast  glucagon  Injectable 1 milliGRAM(s) IntraMuscular once, Stop order after: 1 Doses  insulin glargine Injectable (LANTUS) 8 Unit(s) SubCutaneous at bedtime  insulin lispro (ADMELOG) corrective regimen sliding scale   SubCutaneous every 6 hours  simvastatin 40 milliGRAM(s) Oral at bedtime  aspirin enteric coated 81 milliGRAM(s) Oral daily  calcium acetate 667 milliGRAM(s) Oral four times a day with meals  dextrose 5%. 1000 milliLiter(s) (100 mL/Hr) IV Continuous <Continuous>  dextrose 5%. 1000 milliLiter(s) (50 mL/Hr) IV Continuous <Continuous>  dorzolamide 2%/timolol 0.5% Ophthalmic Solution 1 Drop(s) Both EYES two times a day  lidocaine   4% Patch 1 Patch Transdermal daily  rivaroxaban 20 milliGRAM(s) Oral daily  sodium chloride 0.9% Bolus 250 milliLiter(s) IV Bolus once, Stop order after: 1 Doses    LABS:	 	                       9.7    9.26  )-----------( 220      ( 04 Jun 2022 14:18 )             30.1     06-05  134<L>  |  105  |  19.5  ----------------------------<  174<H>  5.0   |  18.0<L>  |  1.26  Ca    10.0      05 Jun 2022 07:14  Phos  1.7     06-04  Mg     1.8     06-04  PT/INR/PTT ( 25 May 2022 11:16 )                       :                       :      12.5         :       29.5                  .        .                   .              .           .       1.08        .                                       TSH: Thyroid Stimulating Hormone, Serum: 1.20 uIU/mL    TELEMETRY: Afib with rates up to 130's                                                                [facility]                                                         CARDIOLOGY AT [facility]                                                                  [address] [facility], [place] [place] [address]                                                         Telephone: [phone]. Fax:[phone]                                                                             PROGRESS NOTE    Reason for follow up:   Afib  Update:   Cardizem 30mg q6.  Rates continue to be elevated up to 130's, titrate Cardizem as needed for rate control.  As discussed with MD Esteban eldridge telemetry.    Review of symptoms:   Cardiac:  No chest pain. No dyspnea. No palpitations.  Respiratory: no cough. No dyspnea  Gastrointestinal: No diarrhea. No abdominal pain. No bleeding.   Neuro: No focal neuro complaints.    Vitals:  T(C): 36.5 (06-05-22 @ 10:01), Max: 36.5 (06-05-22 @ 05:35)  HR: 128 (06-05-22 @ 10:01) (90 - 136)  BP: 118/66 (06-05-22 @ 10:01) (95/63 - 119/79)  RR: 18 (06-05-22 @ 10:01) (16 - 18)  SpO2: 100% (06-05-22 @ 10:01) (100% - 100%)    I&O's Summary    04 Jun 2022 07:01  -  05 Jun 2022 07:00  --------------------------------------------------------  IN: 300 mL / OUT: 1000 mL / NET: -700 mL      PHYSICAL EXAM:  Appearance: Comfortable. No acute distress  HEENT:  Atraumatic. Normocephalic.  Normal oral mucosa  Neurologic: A & O x 3, no gross focal deficits.  Cardiovascular: Irregular, tachy S1 S2, No murmur, no rubs/gallops. No JVD  Respiratory: Lungs clear to auscultation, unlabored   Gastrointestinal:  Soft, Non-tender, + BS  Lower Extremities: 2+ Peripheral Pulses, No clubbing, cyanosis, or edema  Psychiatry: Patient is calm. No agitation.   Skin: warm and dry.    CURRENT CARDIAC MEDICATIONS:  diltiazem    Tablet 30 milliGRAM(s) Oral every 8 hours  metoprolol tartrate 100 milliGRAM(s) Oral every 12 hours  metoprolol tartrate Injectable 5 milliGRAM(s) IV Push every 6 hours PRN  tamsulosin 0.4 milliGRAM(s) Oral at bedtime    CURRENT OTHER MEDICATIONS:  acetaminophen     Tablet .. 650 milliGRAM(s) Oral every 6 hours PRN Mild Pain (1 - 3)  gabapentin 100 milliGRAM(s) Oral two times a day  pantoprazole    Tablet 40 milliGRAM(s) Oral before breakfast  glucagon  Injectable 1 milliGRAM(s) IntraMuscular once, Stop order after: 1 Doses  insulin glargine Injectable (LANTUS) 8 Unit(s) SubCutaneous at bedtime  insulin lispro (ADMELOG) corrective regimen sliding scale   SubCutaneous every 6 hours  simvastatin 40 milliGRAM(s) Oral at bedtime  aspirin enteric coated 81 milliGRAM(s) Oral daily  calcium acetate 667 milliGRAM(s) Oral four times a day with meals  dextrose 5%. 1000 milliLiter(s) (100 mL/Hr) IV Continuous <Continuous>  dextrose 5%. 1000 milliLiter(s) (50 mL/Hr) IV Continuous <Continuous>  dorzolamide 2%/timolol 0.5% Ophthalmic Solution 1 Drop(s) Both EYES two times a day  lidocaine   4% Patch 1 Patch Transdermal daily  rivaroxaban 20 milliGRAM(s) Oral daily  sodium chloride 0.9% Bolus 250 milliLiter(s) IV Bolus once, Stop order after: 1 Doses    LABS:	 	                       9.7    9.26  )-----------( 220      ( 04 Jun 2022 14:18 )             30.1     06-05  134<L>  |  105  |  19.5  ----------------------------<  174<H>  5.0   |  18.0<L>  |  1.26  Ca    10.0      05 Jun 2022 07:14  Phos  1.7     06-04  Mg     1.8     06-04  PT/INR/PTT ( 25 May 2022 11:16 )                       :                       :      12.5         :       29.5                  .        .                   .              .           .       1.08        .                                       TSH: Thyroid Stimulating Hormone, Serum: 1.20 uIU/mL    TELEMETRY: Afib with rates up to 130's

## 2022-06-05 NOTE — PROGRESS NOTE ADULT - SUBJECTIVE AND OBJECTIVE BOX
Patient is a 89y old  Male who presents with a chief complaint of Acute renal failure- hyperkalemia- hypoglycemia (05 Jun 2022 10:23)    pt is awake,denies any pain,sob,palpitation  REVIEW OF SYSTEMS: Ams-not reliable     MEDICATIONS  (STANDING):  aspirin enteric coated 81 milliGRAM(s) Oral daily  calcium acetate 667 milliGRAM(s) Oral four times a day with meals  dextrose 5%. 1000 milliLiter(s) (100 mL/Hr) IV Continuous <Continuous>  dextrose 5%. 1000 milliLiter(s) (50 mL/Hr) IV Continuous <Continuous>  dextrose 50% Injectable 25 Gram(s) IV Push once  dextrose 50% Injectable 12.5 Gram(s) IV Push once  dextrose 50% Injectable 25 Gram(s) IV Push once  diltiazem    Tablet 30 milliGRAM(s) Oral every 8 hours  dorzolamide 2%/timolol 0.5% Ophthalmic Solution 1 Drop(s) Both EYES two times a day  gabapentin 100 milliGRAM(s) Oral two times a day  glucagon  Injectable 1 milliGRAM(s) IntraMuscular once  insulin glargine Injectable (LANTUS) 8 Unit(s) SubCutaneous at bedtime  insulin lispro (ADMELOG) corrective regimen sliding scale   SubCutaneous every 6 hours  lidocaine   4% Patch 1 Patch Transdermal daily  metoprolol tartrate 100 milliGRAM(s) Oral every 12 hours  pantoprazole    Tablet 40 milliGRAM(s) Oral before breakfast  rivaroxaban 20 milliGRAM(s) Oral daily  simvastatin 40 milliGRAM(s) Oral at bedtime  sodium chloride 0.9% Bolus 250 milliLiter(s) IV Bolus once  tamsulosin 0.4 milliGRAM(s) Oral at bedtime    MEDICATIONS  (PRN):  acetaminophen     Tablet .. 650 milliGRAM(s) Oral every 6 hours PRN Mild Pain (1 - 3)  dextrose Oral Gel 15 Gram(s) Oral once PRN Blood Glucose LESS THAN 70 milliGRAM(s)/deciliter  metoprolol tartrate Injectable 5 milliGRAM(s) IV Push every 6 hours PRN HR >120      CAPILLARY BLOOD GLUCOSE      POCT Blood Glucose.: 239 mg/dL (05 Jun 2022 09:50)  POCT Blood Glucose.: 150 mg/dL (05 Jun 2022 00:39)  POCT Blood Glucose.: 178 mg/dL (04 Jun 2022 21:23)  POCT Blood Glucose.: 196 mg/dL (04 Jun 2022 17:16)  POCT Blood Glucose.: 211 mg/dL (04 Jun 2022 12:18)    I&O's Summary    04 Jun 2022 07:01  -  05 Jun 2022 07:00  --------------------------------------------------------  IN: 300 mL / OUT: 1000 mL / NET: -700 mL        PHYSICAL EXAM:  Vital Signs Last 24 Hrs  T(C): 36.5 (05 Jun 2022 10:01), Max: 36.5 (05 Jun 2022 05:35)  T(F): 97.7 (05 Jun 2022 10:01), Max: 97.7 (05 Jun 2022 05:35)  HR: 128 (05 Jun 2022 10:01) (90 - 136)  BP: 118/66 (05 Jun 2022 10:01) (95/63 - 119/79)  BP(mean): --  RR: 18 (05 Jun 2022 10:01) (16 - 18)  SpO2: 100% (05 Jun 2022 10:01) (100% - 100%)    CONSTITUTIONAL: NAD,  EYES: PERRLA; conjunctiva and sclera clear  ENMT: Moist oral mucosa,   RESPIRATORY: Normal respiratory effort; lungs are clear to auscultation bilaterally  CARDIOVASCULAR: IRR, normal S1 and S2, no murmur   EXTS: No lower extremity edema; Peripheral pulses are 2+ bilaterally,STATIC Change  ABDOMEN: Nontender to palpation, normoactive bowel sounds, no rebound/guarding;   MUSCLOSKELETAL:  no joint swelling or tenderness to palpation  PSYCH: calm  NEUROLOGY: A+O to person, not to place, and time; no gross sensory/motor deficits;       LABS:                        9.7    9.26  )-----------( 220      ( 04 Jun 2022 14:18 )             30.1     06-05    134<L>  |  105  |  19.5  ----------------------------<  174<H>  5.0   |  18.0<L>  |  1.26    Ca    10.0      05 Jun 2022 07:14  Phos  1.7     06-04  Mg     1.8     06-04                  RADIOLOGY & ADDITIONAL TESTS:  Results Reviewed:

## 2022-06-05 NOTE — PROGRESS NOTE ADULT - ASSESSMENT
89M history as listed significant for Advanced dementia, HTN, Afib (not on anticoagulation) admitted with urinary retention/VINCE 133/8.3 and hyperkalemia, noted Afib RVR 150s overnight, unable to take oral meds pending speech/swallow started on IV diltiazem gtt.  Patient now eating, drinking and taking his pills.  On a soft diet.  Rates continue to be elevated at 130's.

## 2022-06-06 LAB
ANION GAP SERPL CALC-SCNC: 9 MMOL/L — SIGNIFICANT CHANGE UP (ref 5–17)
BUN SERPL-MCNC: 22.5 MG/DL — HIGH (ref 8–20)
CALCIUM SERPL-MCNC: 9.7 MG/DL — SIGNIFICANT CHANGE UP (ref 8.6–10.2)
CHLORIDE SERPL-SCNC: 109 MMOL/L — HIGH (ref 98–107)
CO2 SERPL-SCNC: 20 MMOL/L — LOW (ref 22–29)
CREAT SERPL-MCNC: 1.28 MG/DL — SIGNIFICANT CHANGE UP (ref 0.5–1.3)
EGFR: 50 ML/MIN/1.73M2 — LOW
GLUCOSE BLDC GLUCOMTR-MCNC: 131 MG/DL — HIGH (ref 70–99)
GLUCOSE BLDC GLUCOMTR-MCNC: 141 MG/DL — HIGH (ref 70–99)
GLUCOSE BLDC GLUCOMTR-MCNC: 197 MG/DL — HIGH (ref 70–99)
GLUCOSE BLDC GLUCOMTR-MCNC: 210 MG/DL — HIGH (ref 70–99)
GLUCOSE BLDC GLUCOMTR-MCNC: 242 MG/DL — HIGH (ref 70–99)
GLUCOSE BLDC GLUCOMTR-MCNC: 256 MG/DL — HIGH (ref 70–99)
GLUCOSE SERPL-MCNC: 159 MG/DL — HIGH (ref 70–99)
HCT VFR BLD CALC: 27.6 % — LOW (ref 39–50)
HGB BLD-MCNC: 9 G/DL — LOW (ref 13–17)
MAGNESIUM SERPL-MCNC: 2 MG/DL — SIGNIFICANT CHANGE UP (ref 1.6–2.6)
MCHC RBC-ENTMCNC: 28 PG — SIGNIFICANT CHANGE UP (ref 27–34)
MCHC RBC-ENTMCNC: 32.6 GM/DL — SIGNIFICANT CHANGE UP (ref 32–36)
MCV RBC AUTO: 86 FL — SIGNIFICANT CHANGE UP (ref 80–100)
PHOSPHATE SERPL-MCNC: 1.8 MG/DL — LOW (ref 2.4–4.7)
PLATELET # BLD AUTO: 273 K/UL — SIGNIFICANT CHANGE UP (ref 150–400)
POTASSIUM SERPL-MCNC: 4.8 MMOL/L — SIGNIFICANT CHANGE UP (ref 3.5–5.3)
POTASSIUM SERPL-SCNC: 4.8 MMOL/L — SIGNIFICANT CHANGE UP (ref 3.5–5.3)
RBC # BLD: 3.21 M/UL — LOW (ref 4.2–5.8)
RBC # FLD: 12.6 % — SIGNIFICANT CHANGE UP (ref 10.3–14.5)
SODIUM SERPL-SCNC: 138 MMOL/L — SIGNIFICANT CHANGE UP (ref 135–145)
WBC # BLD: 7.68 K/UL — SIGNIFICANT CHANGE UP (ref 3.8–10.5)
WBC # FLD AUTO: 7.68 K/UL — SIGNIFICANT CHANGE UP (ref 3.8–10.5)

## 2022-06-06 PROCEDURE — 99232 SBSQ HOSP IP/OBS MODERATE 35: CPT

## 2022-06-06 RX ADMIN — PANTOPRAZOLE SODIUM 40 MILLIGRAM(S): 20 TABLET, DELAYED RELEASE ORAL at 04:58

## 2022-06-06 RX ADMIN — Medication 667 MILLIGRAM(S): at 10:03

## 2022-06-06 RX ADMIN — Medication 667 MILLIGRAM(S): at 22:40

## 2022-06-06 RX ADMIN — Medication 1 PACKET(S): at 03:22

## 2022-06-06 RX ADMIN — Medication 667 MILLIGRAM(S): at 13:04

## 2022-06-06 RX ADMIN — GABAPENTIN 100 MILLIGRAM(S): 400 CAPSULE ORAL at 17:32

## 2022-06-06 RX ADMIN — SIMVASTATIN 40 MILLIGRAM(S): 20 TABLET, FILM COATED ORAL at 22:40

## 2022-06-06 RX ADMIN — INSULIN GLARGINE 8 UNIT(S): 100 INJECTION, SOLUTION SUBCUTANEOUS at 22:40

## 2022-06-06 RX ADMIN — DORZOLAMIDE HYDROCHLORIDE TIMOLOL MALEATE 1 DROP(S): 20; 5 SOLUTION/ DROPS OPHTHALMIC at 17:29

## 2022-06-06 RX ADMIN — Medication 2: at 17:28

## 2022-06-06 RX ADMIN — TAMSULOSIN HYDROCHLORIDE 0.4 MILLIGRAM(S): 0.4 CAPSULE ORAL at 22:40

## 2022-06-06 RX ADMIN — Medication 1 PACKET(S): at 04:57

## 2022-06-06 RX ADMIN — RIVAROXABAN 20 MILLIGRAM(S): KIT at 13:06

## 2022-06-06 RX ADMIN — Medication 62.5 MILLIMOLE(S): at 16:08

## 2022-06-06 RX ADMIN — Medication 100 MILLIGRAM(S): at 22:40

## 2022-06-06 RX ADMIN — LIDOCAINE 1 PATCH: 4 CREAM TOPICAL at 02:57

## 2022-06-06 RX ADMIN — LIDOCAINE 1 PATCH: 4 CREAM TOPICAL at 13:05

## 2022-06-06 RX ADMIN — Medication 1 PACKET(S): at 17:28

## 2022-06-06 RX ADMIN — GABAPENTIN 100 MILLIGRAM(S): 400 CAPSULE ORAL at 04:58

## 2022-06-06 RX ADMIN — Medication 60 MILLIGRAM(S): at 13:04

## 2022-06-06 RX ADMIN — Medication 100 MILLIGRAM(S): at 00:31

## 2022-06-06 RX ADMIN — DORZOLAMIDE HYDROCHLORIDE TIMOLOL MALEATE 1 DROP(S): 20; 5 SOLUTION/ DROPS OPHTHALMIC at 04:58

## 2022-06-06 RX ADMIN — LIDOCAINE 1 PATCH: 4 CREAM TOPICAL at 03:29

## 2022-06-06 RX ADMIN — Medication 81 MILLIGRAM(S): at 13:05

## 2022-06-06 RX ADMIN — Medication 3: at 12:53

## 2022-06-06 RX ADMIN — Medication 60 MILLIGRAM(S): at 04:58

## 2022-06-06 RX ADMIN — Medication 2: at 00:47

## 2022-06-06 RX ADMIN — Medication 60 MILLIGRAM(S): at 22:40

## 2022-06-06 RX ADMIN — Medication 667 MILLIGRAM(S): at 17:28

## 2022-06-06 RX ADMIN — Medication 100 MILLIGRAM(S): at 13:05

## 2022-06-06 NOTE — PROGRESS NOTE ADULT - SUBJECTIVE AND OBJECTIVE BOX
[facility]                                                         CARDIOLOGY AT [facility]                                                                  [address] [facility], [place]-[address] [place] [address]                                                         Telephone: [phone]. Fax:[phone]                                                                             PROGRESS NOTE    Reason for follow up: AF  Update: Lethargic / not verbal      Review of symptoms: unable to assess    Vitals:  T(C): 36.8 (06-06-22 @ 04:50), Max: 36.8 (06-05-22 @ 21:16)  HR: 133 (06-06-22 @ 04:50) (128 - 133)  BP: 111/77 (06-06-22 @ 04:50) (102/66 - 126/84)  RR: 18 (06-06-22 @ 04:50) (18 - 20)  SpO2: 100% (06-06-22 @ 04:50) (100% - 100%)  Wt(kg): --  I&O's Summary    05 Jun 2022 07:01  -  06 Jun 2022 07:00  --------------------------------------------------------  IN: 0 mL / OUT: 950 mL / NET: -950 mL          PHYSICAL EXAM:  Appearance: lethargic non verbal . No acute distress  HEENT:  Atraumatic. Normocephalic.  Normal oral mucosa  Cardiovascular: RRR S1 S2, 124  No murmur, no rubs/gallops. No JVD  Respiratory: Lungs clear to auscultation, unlabored   Gastrointestinal:  Soft, Non-tender, + BS    Lower Extremities: chronic changes    Skin: warm and dry.    CURRENT CARDIAC MEDICATIONS:  diltiazem    Tablet 60 milliGRAM(s) Oral every 8 hours  metoprolol tartrate 100 milliGRAM(s) Oral every 12 hours  metoprolol tartrate Injectable 5 milliGRAM(s) IV Push every 6 hours PRN  tamsulosin 0.4 milliGRAM(s) Oral at bedtime      CURRENT OTHER MEDICATIONS:  acetaminophen     Tablet .. 650 milliGRAM(s) Oral every 6 hours PRN Mild Pain (1 - 3)  gabapentin 100 milliGRAM(s) Oral two times a day  pantoprazole    Tablet 40 milliGRAM(s) Oral before breakfast  dextrose 50% Injectable 25 Gram(s) IV Push once, Stop order after: 1 Doses  dextrose 50% Injectable 12.5 Gram(s) IV Push once, Stop order after: 1 Doses  dextrose 50% Injectable 25 Gram(s) IV Push once, Stop order after: 1 Doses  dextrose Oral Gel 15 Gram(s) Oral once, Stop order after: 1 Doses PRN Blood Glucose LESS THAN 70 milliGRAM(s)/deciliter  glucagon  Injectable 1 milliGRAM(s) IntraMuscular once, Stop order after: 1 Doses  insulin glargine Injectable (LANTUS) 8 Unit(s) SubCutaneous at bedtime  insulin lispro (ADMELOG) corrective regimen sliding scale   SubCutaneous every 6 hours  simvastatin 40 milliGRAM(s) Oral at bedtime  aspirin enteric coated 81 milliGRAM(s) Oral daily  calcium acetate 667 milliGRAM(s) Oral four times a day with meals  dextrose 5%. 1000 milliLiter(s) (50 mL/Hr) IV Continuous <Continuous>  dextrose 5%. 1000 milliLiter(s) (100 mL/Hr) IV Continuous <Continuous>  dorzolamide 2%/timolol 0.5% Ophthalmic Solution 1 Drop(s) Both EYES two times a day  lidocaine   4% Patch 1 Patch Transdermal daily  potassium phosphate / sodium phosphate Powder (PHOS-NaK) 1 Packet(s) Oral two times a day  rivaroxaban 20 milliGRAM(s) Oral daily  sodium chloride 0.9% Bolus 250 milliLiter(s) IV Bolus once, Stop order after: 1 Doses      LABS:	 	                            9.0    7.68  )-----------( 273      ( 06 Jun 2022 07:01 )             27.6     06-06    138  |  109<H>  |  22.5<H>  ----------------------------<  159<H>  4.8   |  20.0<L>  |  1.28    Ca    9.7      06 Jun 2022 07:01  Phos  1.8     06-06  Mg     2.0     06-06      PT/INR/PTT ( 25 May 2022 11:16 )                       :                       :      12.5         :       29.5                  .        .                   .              .           .       1.08        .                                       Lipid Profile:   HgA1c:   TSH:     TELEMETRY: 's     DIAGNOSTIC TESTING:  [ ] Echocardiogram:   < from: TTE Echo Complete w/o Contrast w/ Doppler (05.30.22 @ 13:11) >  . Technically difficult study.   2. Due to rapid Afib overall left ventricular ejection fraction   difficult to accurately assess, but grossly by visual estimation probably   is 50 to 55%.   3. The mitral in-flow pattern reveals no discernable A-wave, therefore   no comment on diastolic function can be made.   4. Normal right ventricular size and function, estimated PASP least 20   mmHg.   5. Mildly enlarged left atrium.   6. Mild mitral annular calcification.   7. Mild mitral valve regurgitation.   8. Mild tricuspid regurgitation.   9. Sclerotic aortic valve with normal opening.  10. There is no evidence of pericardial effusion.

## 2022-06-06 NOTE — PROGRESS NOTE ADULT - NS ATTEND OPT1 GEN_ALL_CORE
I attest my time as attending is greater than 50% of the total combined time spent on qualifying patient care activities by the PA/NP and attending.
I independently performed the documented:
I independently performed the documented:
I attest my time as attending is greater than 50% of the total combined time spent on qualifying patient care activities by the PA/NP and attending.
I independently performed the documented:

## 2022-06-06 NOTE — CHART NOTE - NSCHARTNOTEFT_GEN_A_CORE
Spoke with daughter Lisset Benavides regarding discharge planning. Patient was seen by  and recommend discharge with conley catheter. Daughter states she has concerns regarding dc with conley and requesting a voiding trial prior to discharge. Discussed with  and explained recommendations. Advised daughter that patient will likely not pass voiding trial and there may be difficulty with reinsertion due to BPH and bladder wall thickening.  Daughter insisting that it be done. As per  recommendations, will dc in early am so that  PA and Attending will be available for conley replacement.

## 2022-06-06 NOTE — PROGRESS NOTE ADULT - NS ATTEND AMEND GEN_ALL_CORE FT
agree with above,    89M history as listed significant for Advanced dementia, HTN, Afib (not on anticoagulation) admitted with urinary retention/VINCE 133/8.3 and hyperkalemia, noted Afib RVR 150s overnight, unable to take oral meds pending speech/swallow started on IV diltiazem gtt      -Afib RVR 130s asymptomatic and normal LV EF, will accept lenient rate control, currently HR 80-90s on metoprolol 100mg BID and diltiazem 60mg TID, need meds to be crushed hence not able to change to long acting meds and doubting actually swallowing pills, advanced dementia would not offer GLENIS/CV at this juncture  -mainly bedbound nonambulatory, reports has 10hrs HHA, had prior stroke >20yrs ago per his daughter, elevated CHADSVAsc score recommend anticoagulation, no recent bleeding history, continue Xarelto 20mg once daily   -urinary retention/VINCE- continue [facility] cath per urology    Cardiology will sign off, discharge planning.       Yannick Coates DO, [facility]  Faculty Non-Invasive Cardiologist  [phone].

## 2022-06-06 NOTE — PROGRESS NOTE ADULT - SUBJECTIVE AND OBJECTIVE BOX
Patient is a 89y old  Male who presents with a chief complaint of Acute renal failure- hyperkalemia- hypoglycemia (06 Jun 2022 10:09)    pt is awake,denies any pain, limited interaction for mental status  REVIEW OF SYSTEMS: unable ams    MEDICATIONS  (STANDING):  aspirin enteric coated 81 milliGRAM(s) Oral daily  calcium acetate 667 milliGRAM(s) Oral four times a day with meals  dextrose 5%. 1000 milliLiter(s) (50 mL/Hr) IV Continuous <Continuous>  dextrose 5%. 1000 milliLiter(s) (100 mL/Hr) IV Continuous <Continuous>  dextrose 50% Injectable 25 Gram(s) IV Push once  dextrose 50% Injectable 12.5 Gram(s) IV Push once  dextrose 50% Injectable 25 Gram(s) IV Push once  diltiazem    Tablet 60 milliGRAM(s) Oral every 8 hours  dorzolamide 2%/timolol 0.5% Ophthalmic Solution 1 Drop(s) Both EYES two times a day  gabapentin 100 milliGRAM(s) Oral two times a day  glucagon  Injectable 1 milliGRAM(s) IntraMuscular once  insulin glargine Injectable (LANTUS) 8 Unit(s) SubCutaneous at bedtime  insulin lispro (ADMELOG) corrective regimen sliding scale   SubCutaneous every 6 hours  lidocaine   4% Patch 1 Patch Transdermal daily  metoprolol tartrate 100 milliGRAM(s) Oral every 12 hours  pantoprazole    Tablet 40 milliGRAM(s) Oral before breakfast  potassium phosphate / sodium phosphate Powder (PHOS-NaK) 1 Packet(s) Oral two times a day  rivaroxaban 20 milliGRAM(s) Oral daily  simvastatin 40 milliGRAM(s) Oral at bedtime  sodium chloride 0.9% Bolus 250 milliLiter(s) IV Bolus once  tamsulosin 0.4 milliGRAM(s) Oral at bedtime    MEDICATIONS  (PRN):  acetaminophen     Tablet .. 650 milliGRAM(s) Oral every 6 hours PRN Mild Pain (1 - 3)  dextrose Oral Gel 15 Gram(s) Oral once PRN Blood Glucose LESS THAN 70 milliGRAM(s)/deciliter  metoprolol tartrate Injectable 5 milliGRAM(s) IV Push every 6 hours PRN HR >120      CAPILLARY BLOOD GLUCOSE      POCT Blood Glucose.: 256 mg/dL (06 Jun 2022 12:10)  POCT Blood Glucose.: 141 mg/dL (06 Jun 2022 08:25)  POCT Blood Glucose.: 131 mg/dL (06 Jun 2022 04:52)  POCT Blood Glucose.: 210 mg/dL (06 Jun 2022 00:30)  POCT Blood Glucose.: 242 mg/dL (05 Jun 2022 21:33)  POCT Blood Glucose.: 265 mg/dL (05 Jun 2022 18:26)  POCT Blood Glucose.: 305 mg/dL (05 Jun 2022 15:19)    I&O's Summary    05 Jun 2022 07:01  -  06 Jun 2022 07:00  --------------------------------------------------------  IN: 0 mL / OUT: 950 mL / NET: -950 mL        PHYSICAL EXAM:  Vital Signs Last 24 Hrs  T(C): 36.7 (06 Jun 2022 10:30), Max: 36.8 (05 Jun 2022 21:16)  T(F): 98 (06 Jun 2022 10:30), Max: 98.3 (06 Jun 2022 04:50)  HR: 128 (06 Jun 2022 10:30) (128 - 133)  BP: 100/60 (06 Jun 2022 10:30) (100/60 - 126/84)  BP(mean): --  RR: 17 (06 Jun 2022 10:30) (17 - 20)  SpO2: 100% (06 Jun 2022 10:30) (100% - 100%)    CONSTITUTIONAL: NAD,  EYES: PERRLA; conjunctiva and sclera clear  ENMT: Moist oral mucosa,   RESPIRATORY: Normal respiratory effort; lungs are clear to auscultation bilaterally  CARDIOVASCULAR: IRR, normal S1 and S2, no murmur   EXTS: No lower extremity edema; STATIC CHANGE    ABDOMEN: Nontender to palpation, normoactive bowel sounds, no rebound/guarding;   MUSCLOSKELETAL:    no joint swelling or tenderness to palpation  PSYCH: CALM,Non coop  NEUROLOGY: pt is awake, confused, does not follow commands, moving exts.      LABS:                        9.0    7.68  )-----------( 273      ( 06 Jun 2022 07:01 )             27.6     06-06    138  |  109<H>  |  22.5<H>  ----------------------------<  159<H>  4.8   |  20.0<L>  |  1.28    Ca    9.7      06 Jun 2022 07:01  Phos  1.8     06-06  Mg     2.0     06-06                  RADIOLOGY & ADDITIONAL TESTS:  Results Reviewed:   < from: CT Abdomen and Pelvis No Cont (06.02.22 @ 19:10) >  1. Stable 2 mm nonobstructing right renal stone. Severe concentric   bladder wall thickening with Kaiser catheter in place and perivesicular   stranding.  2. 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

## 2022-06-06 NOTE — PROGRESS NOTE ADULT - ASSESSMENT
89 year old creole speaking male (DNR) with medical hx significant forAtrial fib. IDDM , hypertension, hyperlipidemia, GERD brought in for confusion change in mental status. found to be in ARF and had urinary retention. Kaiser currently in  Arrived in Atrial fib with controlled silvino response.  We were asked to see him for rates up to 150   Patent is unable to participate in history due to confusion      6/6/22: lethargic , non verbal BP stable but still with -130   HFR 90s when takes meds , needs much encouragement to take PO according to RNs     Problem/Recommendation - 1:  ·  Problem:  Atrial fibrillation. 's maintaining at times/ AC Xeratlo   suspect pt not getting meds due to lethargy and difficulty taking PO / confusion / combative   Has Prn order for lopressor getting it sporidacilly   ·Continue  ASA/ statin/ BB/ CHB    GOC  discussed / Palliative care consult noted  D/W Dr. Coates  89 year old creole speaking male (DNR) with medical hx significant forAtrial fib. IDDM , hypertension, hyperlipidemia, GERD brought in for confusion change in mental status. found to be in ARF and had urinary retention. Kaiser currently in  Arrived in Atrial fib with controlled silvino response.  We were asked to see him for rates up to 150   Patent is unable to participate in history due to confusion      6/6/22: lethargic , non verbal BP stable but still with -130   HFR 90s when takes meds , needs much encouragement to take PO according to RNs     Problem/Recommendation - 1:  ·  Problem:  Atrial fibrillation. 's maintaining at times/ AC Xeratlo   suspect pt not getting meds due to lethargy and difficulty taking PO / confusion / combative   Has Prn order for lopressor getting it sporidacilly   ·Continue  ASA/ statin/ BB/ CHB    GOC  discussed / Palliative care consult noted  D/W Dr. Coates will d/c tele and sign off   nothing more from cardiac standpoint

## 2022-06-06 NOTE — PROGRESS NOTE ADULT - TIME BILLING
Afib RVR
Afib RVR, Advanced dementia
Persistent Afib RVR
as above.
persistent Afib, Advanced dementia
as above.

## 2022-06-07 ENCOUNTER — TRANSCRIPTION ENCOUNTER (OUTPATIENT)
Age: 87
End: 2022-06-07

## 2022-06-07 VITALS
HEART RATE: 89 BPM | TEMPERATURE: 98 F | DIASTOLIC BLOOD PRESSURE: 52 MMHG | RESPIRATION RATE: 18 BRPM | SYSTOLIC BLOOD PRESSURE: 111 MMHG | OXYGEN SATURATION: 95 %

## 2022-06-07 LAB
ANION GAP SERPL CALC-SCNC: 8 MMOL/L — SIGNIFICANT CHANGE UP (ref 5–17)
BUN SERPL-MCNC: 24.4 MG/DL — HIGH (ref 8–20)
CALCIUM SERPL-MCNC: 9.9 MG/DL — SIGNIFICANT CHANGE UP (ref 8.6–10.2)
CHLORIDE SERPL-SCNC: 107 MMOL/L — SIGNIFICANT CHANGE UP (ref 98–107)
CO2 SERPL-SCNC: 23 MMOL/L — SIGNIFICANT CHANGE UP (ref 22–29)
CREAT SERPL-MCNC: 1.66 MG/DL — HIGH (ref 0.5–1.3)
EGFR: 37 ML/MIN/1.73M2 — LOW
GLUCOSE BLDC GLUCOMTR-MCNC: 116 MG/DL — HIGH (ref 70–99)
GLUCOSE BLDC GLUCOMTR-MCNC: 210 MG/DL — HIGH (ref 70–99)
GLUCOSE BLDC GLUCOMTR-MCNC: 211 MG/DL — HIGH (ref 70–99)
GLUCOSE SERPL-MCNC: 113 MG/DL — HIGH (ref 70–99)
PHOSPHATE SERPL-MCNC: 2.9 MG/DL — SIGNIFICANT CHANGE UP (ref 2.4–4.7)
POTASSIUM SERPL-MCNC: 5.2 MMOL/L — SIGNIFICANT CHANGE UP (ref 3.5–5.3)
POTASSIUM SERPL-SCNC: 5.2 MMOL/L — SIGNIFICANT CHANGE UP (ref 3.5–5.3)
SARS-COV-2 RNA SPEC QL NAA+PROBE: SIGNIFICANT CHANGE UP
SODIUM SERPL-SCNC: 138 MMOL/L — SIGNIFICANT CHANGE UP (ref 135–145)

## 2022-06-07 PROCEDURE — 84100 ASSAY OF PHOSPHORUS: CPT

## 2022-06-07 PROCEDURE — 83036 HEMOGLOBIN GLYCOSYLATED A1C: CPT

## 2022-06-07 PROCEDURE — 87637 SARSCOV2&INF A&B&RSV AMP PRB: CPT

## 2022-06-07 PROCEDURE — 80053 COMPREHEN METABOLIC PANEL: CPT

## 2022-06-07 PROCEDURE — 93005 ELECTROCARDIOGRAM TRACING: CPT

## 2022-06-07 PROCEDURE — 87086 URINE CULTURE/COLONY COUNT: CPT

## 2022-06-07 PROCEDURE — 99239 HOSP IP/OBS DSCHRG MGMT >30: CPT

## 2022-06-07 PROCEDURE — 87046 STOOL CULTR AEROBIC BACT EA: CPT

## 2022-06-07 PROCEDURE — U0005: CPT

## 2022-06-07 PROCEDURE — 87077 CULTURE AEROBIC IDENTIFY: CPT

## 2022-06-07 PROCEDURE — 92526 ORAL FUNCTION THERAPY: CPT

## 2022-06-07 PROCEDURE — 82962 GLUCOSE BLOOD TEST: CPT

## 2022-06-07 PROCEDURE — 36415 COLL VENOUS BLD VENIPUNCTURE: CPT

## 2022-06-07 PROCEDURE — 81001 URINALYSIS AUTO W/SCOPE: CPT

## 2022-06-07 PROCEDURE — 85730 THROMBOPLASTIN TIME PARTIAL: CPT

## 2022-06-07 PROCEDURE — 85025 COMPLETE CBC W/AUTO DIFF WBC: CPT

## 2022-06-07 PROCEDURE — 99285 EMERGENCY DEPT VISIT HI MDM: CPT | Mod: 25

## 2022-06-07 PROCEDURE — 92610 EVALUATE SWALLOWING FUNCTION: CPT

## 2022-06-07 PROCEDURE — 85610 PROTHROMBIN TIME: CPT

## 2022-06-07 PROCEDURE — 87150 DNA/RNA AMPLIFIED PROBE: CPT

## 2022-06-07 PROCEDURE — 82140 ASSAY OF AMMONIA: CPT

## 2022-06-07 PROCEDURE — 84443 ASSAY THYROID STIM HORMONE: CPT

## 2022-06-07 PROCEDURE — 85027 COMPLETE CBC AUTOMATED: CPT

## 2022-06-07 PROCEDURE — 74018 RADEX ABDOMEN 1 VIEW: CPT

## 2022-06-07 PROCEDURE — 96365 THER/PROPH/DIAG IV INF INIT: CPT

## 2022-06-07 PROCEDURE — 80048 BASIC METABOLIC PNL TOTAL CA: CPT

## 2022-06-07 PROCEDURE — 87045 FECES CULTURE AEROBIC BACT: CPT

## 2022-06-07 PROCEDURE — 96367 TX/PROPH/DG ADDL SEQ IV INF: CPT

## 2022-06-07 PROCEDURE — 71045 X-RAY EXAM CHEST 1 VIEW: CPT

## 2022-06-07 PROCEDURE — 83735 ASSAY OF MAGNESIUM: CPT

## 2022-06-07 PROCEDURE — 96375 TX/PRO/DX INJ NEW DRUG ADDON: CPT

## 2022-06-07 PROCEDURE — 87040 BLOOD CULTURE FOR BACTERIA: CPT

## 2022-06-07 PROCEDURE — U0003: CPT

## 2022-06-07 PROCEDURE — 93306 TTE W/DOPPLER COMPLETE: CPT

## 2022-06-07 PROCEDURE — 74176 CT ABD & PELVIS W/O CONTRAST: CPT

## 2022-06-07 RX ORDER — INSULIN GLARGINE 100 [IU]/ML
8 INJECTION, SOLUTION SUBCUTANEOUS
Qty: 30 | Refills: 0
Start: 2022-06-07 | End: 2022-07-06

## 2022-06-07 RX ORDER — GABAPENTIN 400 MG/1
1 CAPSULE ORAL
Qty: 60 | Refills: 0
Start: 2022-06-07 | End: 2022-07-06

## 2022-06-07 RX ORDER — GABAPENTIN 400 MG/1
0 CAPSULE ORAL
Qty: 0 | Refills: 0 | DISCHARGE

## 2022-06-07 RX ORDER — LIDOCAINE 4 G/100G
1 CREAM TOPICAL
Qty: 30 | Refills: 0
Start: 2022-06-07 | End: 2022-07-06

## 2022-06-07 RX ORDER — GLIMEPIRIDE 1 MG
1 TABLET ORAL
Qty: 0 | Refills: 0 | DISCHARGE

## 2022-06-07 RX ORDER — RIVAROXABAN 15 MG-20MG
1 KIT ORAL
Qty: 30 | Refills: 0
Start: 2022-06-07 | End: 2022-07-06

## 2022-06-07 RX ORDER — TAMSULOSIN HYDROCHLORIDE 0.4 MG/1
1 CAPSULE ORAL
Qty: 30 | Refills: 0
Start: 2022-06-07 | End: 2022-07-06

## 2022-06-07 RX ORDER — DILTIAZEM HCL 120 MG
1 CAPSULE, EXT RELEASE 24 HR ORAL
Qty: 90 | Refills: 0
Start: 2022-06-07 | End: 2022-07-06

## 2022-06-07 RX ORDER — INSULIN GLARGINE 100 [IU]/ML
0 INJECTION, SOLUTION SUBCUTANEOUS
Qty: 0 | Refills: 0 | DISCHARGE

## 2022-06-07 RX ORDER — CALCIUM ACETATE 667 MG
1 TABLET ORAL
Qty: 120 | Refills: 0
Start: 2022-06-07 | End: 2022-07-06

## 2022-06-07 RX ORDER — DILTIAZEM HCL 120 MG
30 CAPSULE, EXT RELEASE 24 HR ORAL ONCE
Refills: 0 | Status: COMPLETED | OUTPATIENT
Start: 2022-06-07 | End: 2022-06-07

## 2022-06-07 RX ORDER — LISINOPRIL/HYDROCHLOROTHIAZIDE 10-12.5 MG
1 TABLET ORAL
Qty: 0 | Refills: 0 | DISCHARGE

## 2022-06-07 RX ORDER — SODIUM CHLORIDE 9 MG/ML
250 INJECTION INTRAMUSCULAR; INTRAVENOUS; SUBCUTANEOUS ONCE
Refills: 0 | Status: DISCONTINUED | OUTPATIENT
Start: 2022-06-07 | End: 2022-06-07

## 2022-06-07 RX ORDER — METOPROLOL TARTRATE 50 MG
1 TABLET ORAL
Qty: 60 | Refills: 0
Start: 2022-06-07 | End: 2022-07-06

## 2022-06-07 RX ADMIN — RIVAROXABAN 20 MILLIGRAM(S): KIT at 12:48

## 2022-06-07 RX ADMIN — Medication 81 MILLIGRAM(S): at 12:45

## 2022-06-07 RX ADMIN — Medication 2: at 12:44

## 2022-06-07 RX ADMIN — Medication 1 PACKET(S): at 05:50

## 2022-06-07 RX ADMIN — GABAPENTIN 100 MILLIGRAM(S): 400 CAPSULE ORAL at 05:50

## 2022-06-07 RX ADMIN — Medication 667 MILLIGRAM(S): at 14:19

## 2022-06-07 RX ADMIN — PANTOPRAZOLE SODIUM 40 MILLIGRAM(S): 20 TABLET, DELAYED RELEASE ORAL at 05:50

## 2022-06-07 RX ADMIN — SODIUM CHLORIDE 500 MILLILITER(S): 9 INJECTION INTRAMUSCULAR; INTRAVENOUS; SUBCUTANEOUS at 14:52

## 2022-06-07 RX ADMIN — DORZOLAMIDE HYDROCHLORIDE TIMOLOL MALEATE 1 DROP(S): 20; 5 SOLUTION/ DROPS OPHTHALMIC at 05:53

## 2022-06-07 RX ADMIN — Medication 667 MILLIGRAM(S): at 08:17

## 2022-06-07 RX ADMIN — Medication 30 MILLIGRAM(S): at 05:50

## 2022-06-07 RX ADMIN — Medication 2: at 00:18

## 2022-06-07 RX ADMIN — Medication 60 MILLIGRAM(S): at 14:28

## 2022-06-07 NOTE — DISCHARGE NOTE PROVIDER - HOSPITAL COURSE
89M with a history of hypertension, diabetes, gastric mass, and GERD who presented with a three day history of confusion and lethargy.     A fib rvr  HR stable am.. improve when takes meds , needs much encouragement to take PO according to RNs   pt is inconsistence w/ po meds, confusion / combative latgregg   Has Prn order for iv lopressor getting it sporidially   Xarelto   monitor lytes and replaced  over all prognosis gaurded      VINCE 2/2 urinary retention likely from BPH  Cr trending up as dc conley with retention. did not void yet  bladder scan now ,will reinsert foly if unable to void   continue to hold HCTZ-lisinopril  c/w tamsulosin  urology consult appreciated.  urology rec to  discharged on conley, f/u out pt  f/u bmp      Metabolic encephalopathy   monitor mental status  ? underlying dementia     Dysphagia  SLP following- rec soft bite sized diet with mildly thick liquid      Diabetes - Insulin coverage,     Hypertension  Monitor bp.stable  continue bb    HLD:  statins     Glaucoma    Continue with eye drops.    Neuropathy  gabapentin       Overall prognosis guarded / poor. Palliative Care following.  Daughter refused hospice. she wants to take him home-understood risk of high mortality   DNR/DNI

## 2022-06-07 NOTE — PROGRESS NOTE ADULT - REASON FOR ADMISSION
Acute renal failure- hyperkalemia- hypoglycemia

## 2022-06-07 NOTE — DISCHARGE NOTE PROVIDER - CARE PROVIDER_API CALL
Ervin Archer)  Internal Medicine; Nephrology  [address], [address]  [place], [place] [address]  Phone: [phone]  Fax: [phone]  Follow Up Time: 2 weeks    Mari Kraft)  Cardiology; Internal Medicine  [address], [address]  [place], [place] [address]  Phone: [phone]  Fax: [phone]  Follow Up Time: 1 week    Jim Carranza)  Urology  [address] [place] [address]  Phone: [phone]  Fax: [phone]  Follow Up Time: 1 week

## 2022-06-07 NOTE — DISCHARGE NOTE PROVIDER - NSDCMRMEDTOKEN_GEN_ALL_CORE_FT
acetaminophen 325 mg oral tablet: 2 tab(s) orally every 6 hours, As needed, Moderate Pain (4 - 6)  aspirin 81 mg oral tablet: 1 tab(s) orally once a day  calcium acetate 667 mg oral tablet: 1 tab(s) orally 4 times a day with meal  dilTIAZem 60 mg oral tablet: 1 tab(s) orally every 8 hours  dorzolamide-timolol 2%-0.5% preservative-free ophthalmic solution: 1 drop(s) to each affected eye 2 times a day  gabapentin 100 mg oral capsule: 1 cap(s) orally 2 times a day  insulin glargine 100 units/mL subcutaneous solution: 8 unit(s) subcutaneous once a day (at bedtime)  lidocaine 4% topical film: Apply topically to affected area once a day  metoprolol tartrate 100 mg oral tablet: 1 tab(s) orally every 12 hours  pantoprazole 20 mg oral delayed release tablet: 1 tab(s) orally once a day  rivaroxaban 20 mg oral tablet: 1 tab(s) orally once a day  simvastatin 40 mg oral tablet: 1 tab(s) orally once a day (at bedtime)  tamsulosin 0.4 mg oral capsule: 1 cap(s) orally once a day (at bedtime)

## 2022-06-07 NOTE — DISCHARGE NOTE NURSING/CASE MANAGEMENT/SOCIAL WORK - PATIENT PORTAL LINK FT
You can access the FollowMyHealth Patient Portal offered by [facility] by registering at the following website: [email or web address]. By joining [employer]’s FollowMyHealth portal, you will also be able to view your health information using other applications (apps) compatible with our system.
You can access the FollowMyHealth Patient Portal offered by [facility] by registering at the following website: [email or web address]. By joining [facility]’s FollowMyHealth portal, you will also be able to view your health information using other applications (apps) compatible with our system.

## 2022-06-07 NOTE — DISCHARGE NOTE PROVIDER - NSDCFUADDAPPT_GEN_ALL_CORE_FT
Please follow up with Urologist:  Until follow up visitwith urologist:  -may change or replace catheter if leaking or dislodged  -may irrigate with 30-60 cc NS prn for clogging or sediment

## 2022-06-07 NOTE — PROGRESS NOTE ADULT - PROVIDER SPECIALTY LIST ADULT
Hospitalist
Nephrology
Palliative Care
Palliative Care
Cardiology
Hospitalist
Nephrology
Hospitalist
Nephrology
Cardiology
Hospitalist
Nephrology
Nephrology
Cardiology
Nephrology
Cardiology

## 2022-06-07 NOTE — DISCHARGE NOTE PROVIDER - DETAILS OF MALNUTRITION DIAGNOSIS/DIAGNOSES
This patient has been assessed with a concern for Malnutrition and was treated during this hospitalization for the following Nutrition diagnosis/diagnoses:     -  05/31/2022: Moderate protein-calorie malnutrition

## 2022-06-07 NOTE — PROGRESS NOTE ADULT - NUTRITIONAL ASSESSMENT
This patient has been assessed with a concern for Malnutrition and has been determined to have a diagnosis/diagnoses of Moderate protein-calorie malnutrition.    This patient is being managed with:   Diet Soft and Bite Sized-  Entered: May 30 2022 12:32PM    

## 2022-06-07 NOTE — CHART NOTE - NSCHARTNOTEFT_GEN_A_CORE
pt with urinary retention, bladder outlet obstruction  Pt's daughter insisted on giving pt TOV  Pt had 2 L urine in his bladder on admission with conley insertion  pt has enlarged prostate and thickened bladder wall  this is a chronic condition  pt has still yet to void  bladder scans now up to 464ml  pt should have conley reinserted and d/c home with leg bag

## 2022-06-07 NOTE — PROGRESS NOTE ADULT - SUBJECTIVE AND OBJECTIVE BOX
Patient is a 89y old  Male who presents with a chief complaint of Acute renal failure- hyperkalemia- hypoglycemia (06 Jun 2022 12:56)      pt is awake,c/o pain lower abd. Limited historian as pt is confuse at baseline  per RN -bladder scan 5 am 300cc.   REVIEW OF SYSTEMS: AMS-Unable    MEDICATIONS  (STANDING):  aspirin enteric coated 81 milliGRAM(s) Oral daily  calcium acetate 667 milliGRAM(s) Oral four times a day with meals  dextrose 5%. 1000 milliLiter(s) (50 mL/Hr) IV Continuous <Continuous>  dextrose 5%. 1000 milliLiter(s) (100 mL/Hr) IV Continuous <Continuous>  dextrose 50% Injectable 25 Gram(s) IV Push once  dextrose 50% Injectable 12.5 Gram(s) IV Push once  dextrose 50% Injectable 25 Gram(s) IV Push once  diltiazem    Tablet 60 milliGRAM(s) Oral every 8 hours  dorzolamide 2%/timolol 0.5% Ophthalmic Solution 1 Drop(s) Both EYES two times a day  gabapentin 100 milliGRAM(s) Oral two times a day  glucagon  Injectable 1 milliGRAM(s) IntraMuscular once  insulin glargine Injectable (LANTUS) 8 Unit(s) SubCutaneous at bedtime  insulin lispro (ADMELOG) corrective regimen sliding scale   SubCutaneous every 6 hours  lidocaine   4% Patch 1 Patch Transdermal daily  metoprolol tartrate 100 milliGRAM(s) Oral every 12 hours  pantoprazole    Tablet 40 milliGRAM(s) Oral before breakfast  potassium phosphate / sodium phosphate Powder (PHOS-NaK) 1 Packet(s) Oral two times a day  rivaroxaban 20 milliGRAM(s) Oral daily  simvastatin 40 milliGRAM(s) Oral at bedtime  sodium chloride 0.9% Bolus 250 milliLiter(s) IV Bolus once  tamsulosin 0.4 milliGRAM(s) Oral at bedtime    MEDICATIONS  (PRN):  acetaminophen     Tablet .. 650 milliGRAM(s) Oral every 6 hours PRN Mild Pain (1 - 3)  dextrose Oral Gel 15 Gram(s) Oral once PRN Blood Glucose LESS THAN 70 milliGRAM(s)/deciliter  metoprolol tartrate Injectable 5 milliGRAM(s) IV Push every 6 hours PRN HR >120      CAPILLARY BLOOD GLUCOSE      POCT Blood Glucose.: 116 mg/dL (07 Jun 2022 06:03)  POCT Blood Glucose.: 211 mg/dL (07 Jun 2022 00:01)  POCT Blood Glucose.: 197 mg/dL (06 Jun 2022 22:31)  POCT Blood Glucose.: 242 mg/dL (06 Jun 2022 17:11)  POCT Blood Glucose.: 256 mg/dL (06 Jun 2022 12:10)    I&O's Summary    06 Jun 2022 07:01  -  07 Jun 2022 07:00  --------------------------------------------------------  IN: 0 mL / OUT: 350 mL / NET: -350 mL        PHYSICAL EXAM:  Vital Signs Last 24 Hrs  T(C): 36.7 (07 Jun 2022 10:08), Max: 36.7 (06 Jun 2022 22:35)  T(F): 98 (07 Jun 2022 10:08), Max: 98.1 (06 Jun 2022 22:35)  HR: 81 (07 Jun 2022 10:08) (81 - 129)  BP: 102/65 (07 Jun 2022 10:08) (93/56 - 155/72)  BP(mean): 92 (06 Jun 2022 13:12) (92 - 92)  RR: 18 (07 Jun 2022 10:08) (18 - 20)  SpO2: 97% (07 Jun 2022 10:08) (96% - 98%)    CONSTITUTIONAL: NAD,  EYES: PERRLA; conjunctiva and sclera clear  ENMT: Moist oral mucosa,   RESPIRATORY: Normal respiratory effort; lungs are clear to auscultation bilaterally  CARDIOVASCULAR: Regular rate and rhythm, normal S1 and S2, no murmur   EXTS: No lower extremity edema; Peripheral pulses are 2+ bilaterally  ABDOMEN: tender to palpation lower abd,, normoactive bowel sounds, no rebound/guarding;   PSYCH: calm confused  NEUROLOGY: A+O to person only , not to place, and time; +m/s      LABS:                        9.0    7.68  )-----------( 273      ( 06 Jun 2022 07:01 )             27.6     06-07    138  |  107  |  24.4<H>  ----------------------------<  113<H>  5.2   |  23.0  |  1.66<H>    Ca    9.9      07 Jun 2022 06:25  Phos  2.9     06-07  Mg     2.0     06-06                  RADIOLOGY & ADDITIONAL TESTS:  Results Reviewed:

## 2022-06-07 NOTE — DISCHARGE NOTE PROVIDER - NSDCCPCAREPLAN_GEN_ALL_CORE_FT
PRINCIPAL DISCHARGE DIAGNOSIS  Diagnosis: Atrial fibrillation with RVR  Assessment and Plan of Treatment:       SECONDARY DISCHARGE DIAGNOSES  Diagnosis: Enlarged prostate with urinary retention  Assessment and Plan of Treatment:     Diagnosis: Hyperkalemia  Assessment and Plan of Treatment:     Diagnosis: Afib  Assessment and Plan of Treatment:     Diagnosis: Acute renal failure (ARF)  Assessment and Plan of Treatment:     Diagnosis: Metabolic encephalopathy  Assessment and Plan of Treatment:     Diagnosis: Dementia  Assessment and Plan of Treatment:     Diagnosis: Urinary retention  Assessment and Plan of Treatment:

## 2022-06-07 NOTE — DISCHARGE NOTE NURSING/CASE MANAGEMENT/SOCIAL WORK - NSDCPEFALRISK_GEN_ALL_CORE
For information on Fall & Injury Prevention, visit: [email or web address] OR  [email or web address] OR  https://www.cdc.gov/steadi/patient.html
For information on Fall & Injury Prevention, visit: [email or web address] OR  [email or web address] OR  https://www.cdc.gov/steadi/patient.html

## 2022-06-07 NOTE — DISCHARGE NOTE PROVIDER - PROVIDER TOKENS
PROVIDER:[TOKEN:[[id number]],FOLLOWUP:[2 weeks]],PROVIDER:[TOKEN:[[id number]],FOLLOWUP:[1 week]],PROVIDER:[TOKEN:[[id number]],FOLLOWUP:[1 week]]

## 2022-06-07 NOTE — PROGRESS NOTE ADULT - ASSESSMENT
89M with a history of hypertension, diabetes, gastric mass, and GERD who presented with a three day history of confusion and lethargy.     A fib rvr  HRstable am.. improve when takes meds , needs much encouragement to take PO according to RNs   pt is inconsistence w/ po meds, confusion / combative latgregg   Has Prn order for iv lopressor getting it sporidially   Xarelto   monitor lytes and replaced  over all prognosis gaurded      VINCE 2/2 urinary retention likely from BPH  Cr trending up as dc conley with retention. did not void yet  bladder scan now ,will reinsert foly if unable to void   continue to hold HCTZ-lisinopril  c/w tamsulosin  urology consult appreciated.  urology rec to  discharged on conley, f/u out pt  f/u bmp      Metabolic encephalopathy   monitor mental status  ? underlying dementia     Dysphagia  SLP following- rec soft bite sized diet with mildly thick liquid      Diabetes - Insulin coverage,     Hypertension  Monitor bp.stable  continue bb    HLD:  statins     Glaucoma    Continue with eye drops.    Neuropathy  gabapentin       Overall prognosis guarded / poor. Palliative Care following.  Daughter refused hospice. she wants to take him home-understood risk of high mortality   DNR/DNI  DVT prophylaxis: Scds      Dispo: home w/full assist   sw for safe discharge

## 2022-06-08 ENCOUNTER — INPATIENT (INPATIENT)
Facility: HOSPITAL | Age: 87
LOS: 2 days | Discharge: HOME | DRG: 884 | End: 2022-06-11
Attending: HOSPITALIST | Admitting: FAMILY MEDICINE
Payer: MEDICAID

## 2022-06-08 VITALS — HEIGHT: 68 IN

## 2022-06-08 DIAGNOSIS — E87.5 HYPERKALEMIA: ICD-10-CM

## 2022-06-08 LAB
ALBUMIN SERPL ELPH-MCNC: 3 G/DL — LOW (ref 3.3–5.2)
ALP SERPL-CCNC: 316 U/L — HIGH (ref 40–120)
ALT FLD-CCNC: 702 U/L — HIGH
ANION GAP SERPL CALC-SCNC: 13 MMOL/L — SIGNIFICANT CHANGE UP (ref 5–17)
APPEARANCE UR: ABNORMAL
APTT BLD: 38.2 SEC — HIGH (ref 27.5–35.5)
AST SERPL-CCNC: 890 U/L — HIGH
BACTERIA # UR AUTO: ABNORMAL
BASE EXCESS BLDV CALC-SCNC: -7.6 MMOL/L — LOW (ref -2–3)
BASOPHILS # BLD AUTO: 0.03 K/UL — SIGNIFICANT CHANGE UP (ref 0–0.2)
BASOPHILS NFR BLD AUTO: 0.4 % — SIGNIFICANT CHANGE UP (ref 0–2)
BILIRUB SERPL-MCNC: 1 MG/DL — SIGNIFICANT CHANGE UP (ref 0.4–2)
BILIRUB UR-MCNC: NEGATIVE — SIGNIFICANT CHANGE UP
BUN SERPL-MCNC: 32.5 MG/DL — HIGH (ref 8–20)
CA-I SERPL-SCNC: 1.31 MMOL/L — SIGNIFICANT CHANGE UP (ref 1.15–1.33)
CALCIUM SERPL-MCNC: 9.7 MG/DL — SIGNIFICANT CHANGE UP (ref 8.6–10.2)
CHLORIDE BLDV-SCNC: 103 MMOL/L — SIGNIFICANT CHANGE UP (ref 98–107)
CHLORIDE SERPL-SCNC: 100 MMOL/L — SIGNIFICANT CHANGE UP (ref 98–107)
CO2 SERPL-SCNC: 18 MMOL/L — LOW (ref 22–29)
COLOR SPEC: ABNORMAL
CREAT SERPL-MCNC: 2.05 MG/DL — HIGH (ref 0.5–1.3)
DIFF PNL FLD: ABNORMAL
EGFR: 29 ML/MIN/1.73M2 — LOW
EOSINOPHIL # BLD AUTO: 0.05 K/UL — SIGNIFICANT CHANGE UP (ref 0–0.5)
EOSINOPHIL NFR BLD AUTO: 0.6 % — SIGNIFICANT CHANGE UP (ref 0–6)
EPI CELLS # UR: SIGNIFICANT CHANGE UP
GAS PNL BLDV: 130 MMOL/L — LOW (ref 136–145)
GAS PNL BLDV: SIGNIFICANT CHANGE UP
GAS PNL BLDV: SIGNIFICANT CHANGE UP
GLUCOSE BLDC GLUCOMTR-MCNC: 283 MG/DL — HIGH (ref 70–99)
GLUCOSE BLDC GLUCOMTR-MCNC: 376 MG/DL — HIGH (ref 70–99)
GLUCOSE BLDV-MCNC: 390 MG/DL — HIGH (ref 70–99)
GLUCOSE SERPL-MCNC: 399 MG/DL — HIGH (ref 70–99)
GLUCOSE UR QL: NEGATIVE MG/DL — SIGNIFICANT CHANGE UP
HCO3 BLDV-SCNC: 19 MMOL/L — LOW (ref 22–29)
HCT VFR BLD CALC: 26.1 % — LOW (ref 39–50)
HCT VFR BLDA CALC: 28 % — SIGNIFICANT CHANGE UP
HGB BLD CALC-MCNC: 9.2 G/DL — LOW (ref 12.6–17.4)
HGB BLD-MCNC: 8 G/DL — LOW (ref 13–17)
IMM GRANULOCYTES NFR BLD AUTO: 1.2 % — SIGNIFICANT CHANGE UP (ref 0–1.5)
INR BLD: 3.25 RATIO — HIGH (ref 0.88–1.16)
KETONES UR-MCNC: NEGATIVE — SIGNIFICANT CHANGE UP
LACTATE BLDV-MCNC: 3.9 MMOL/L — HIGH (ref 0.5–2)
LEUKOCYTE ESTERASE UR-ACNC: ABNORMAL
LYMPHOCYTES # BLD AUTO: 2.37 K/UL — SIGNIFICANT CHANGE UP (ref 1–3.3)
LYMPHOCYTES # BLD AUTO: 28.1 % — SIGNIFICANT CHANGE UP (ref 13–44)
MCHC RBC-ENTMCNC: 27.4 PG — SIGNIFICANT CHANGE UP (ref 27–34)
MCHC RBC-ENTMCNC: 30.7 GM/DL — LOW (ref 32–36)
MCV RBC AUTO: 89.4 FL — SIGNIFICANT CHANGE UP (ref 80–100)
MONOCYTES # BLD AUTO: 1.09 K/UL — HIGH (ref 0–0.9)
MONOCYTES NFR BLD AUTO: 12.9 % — SIGNIFICANT CHANGE UP (ref 2–14)
NEUTROPHILS # BLD AUTO: 4.8 K/UL — SIGNIFICANT CHANGE UP (ref 1.8–7.4)
NEUTROPHILS NFR BLD AUTO: 56.8 % — SIGNIFICANT CHANGE UP (ref 43–77)
NITRITE UR-MCNC: NEGATIVE — SIGNIFICANT CHANGE UP
PCO2 BLDV: 42 MMHG — SIGNIFICANT CHANGE UP (ref 42–55)
PH BLDV: 7.27 — LOW (ref 7.32–7.43)
PH UR: 5 — SIGNIFICANT CHANGE UP (ref 5–8)
PLATELET # BLD AUTO: 281 K/UL — SIGNIFICANT CHANGE UP (ref 150–400)
PO2 BLDV: 51 MMHG — HIGH (ref 25–45)
POTASSIUM BLDV-SCNC: 6.5 MMOL/L — CRITICAL HIGH (ref 3.5–5.1)
POTASSIUM SERPL-MCNC: 6.4 MMOL/L — CRITICAL HIGH (ref 3.5–5.3)
POTASSIUM SERPL-SCNC: 6.4 MMOL/L — CRITICAL HIGH (ref 3.5–5.3)
PROT SERPL-MCNC: 6.3 G/DL — LOW (ref 6.6–8.7)
PROT UR-MCNC: 100
PROTHROM AB SERPL-ACNC: 38.1 SEC — HIGH (ref 10.5–13.4)
RAPID RVP RESULT: SIGNIFICANT CHANGE UP
RBC # BLD: 2.92 M/UL — LOW (ref 4.2–5.8)
RBC # FLD: 13.2 % — SIGNIFICANT CHANGE UP (ref 10.3–14.5)
RBC CASTS # UR COMP ASSIST: >50 /HPF (ref 0–4)
SAO2 % BLDV: 81.7 % — SIGNIFICANT CHANGE UP
SARS-COV-2 RNA SPEC QL NAA+PROBE: SIGNIFICANT CHANGE UP
SODIUM SERPL-SCNC: 131 MMOL/L — LOW (ref 135–145)
SP GR SPEC: 1.02 — SIGNIFICANT CHANGE UP (ref 1.01–1.02)
UROBILINOGEN FLD QL: NEGATIVE MG/DL — SIGNIFICANT CHANGE UP
WBC # BLD: 8.44 K/UL — SIGNIFICANT CHANGE UP (ref 3.8–10.5)
WBC # FLD AUTO: 8.44 K/UL — SIGNIFICANT CHANGE UP (ref 3.8–10.5)
WBC UR QL: ABNORMAL /HPF (ref 0–5)

## 2022-06-08 PROCEDURE — 99223 1ST HOSP IP/OBS HIGH 75: CPT

## 2022-06-08 PROCEDURE — 93010 ELECTROCARDIOGRAM REPORT: CPT

## 2022-06-08 PROCEDURE — 99291 CRITICAL CARE FIRST HOUR: CPT

## 2022-06-08 PROCEDURE — 71045 X-RAY EXAM CHEST 1 VIEW: CPT | Mod: 26

## 2022-06-08 RX ORDER — PIPERACILLIN AND TAZOBACTAM 4; .5 G/20ML; G/20ML
3.38 INJECTION, POWDER, LYOPHILIZED, FOR SOLUTION INTRAVENOUS EVERY 8 HOURS
Refills: 0 | Status: DISCONTINUED | OUTPATIENT
Start: 2022-06-08 | End: 2022-06-10

## 2022-06-08 RX ORDER — DEXTROSE 50 % IN WATER 50 %
12.5 SYRINGE (ML) INTRAVENOUS ONCE
Refills: 0 | Status: DISCONTINUED | OUTPATIENT
Start: 2022-06-08 | End: 2022-06-11

## 2022-06-08 RX ORDER — DEXTROSE 50 % IN WATER 50 %
25 SYRINGE (ML) INTRAVENOUS ONCE
Refills: 0 | Status: DISCONTINUED | OUTPATIENT
Start: 2022-06-08 | End: 2022-06-11

## 2022-06-08 RX ORDER — SODIUM CHLORIDE 9 MG/ML
1000 INJECTION, SOLUTION INTRAVENOUS ONCE
Refills: 0 | Status: DISCONTINUED | OUTPATIENT
Start: 2022-06-08 | End: 2022-06-08

## 2022-06-08 RX ORDER — VANCOMYCIN HCL 1 G
1000 VIAL (EA) INTRAVENOUS ONCE
Refills: 0 | Status: COMPLETED | OUTPATIENT
Start: 2022-06-08 | End: 2022-06-08

## 2022-06-08 RX ORDER — SODIUM ZIRCONIUM CYCLOSILICATE 10 G/10G
10 POWDER, FOR SUSPENSION ORAL ONCE
Refills: 0 | Status: DISCONTINUED | OUTPATIENT
Start: 2022-06-08 | End: 2022-06-08

## 2022-06-08 RX ORDER — DEXTROSE 50 % IN WATER 50 %
15 SYRINGE (ML) INTRAVENOUS ONCE
Refills: 0 | Status: DISCONTINUED | OUTPATIENT
Start: 2022-06-08 | End: 2022-06-11

## 2022-06-08 RX ORDER — INSULIN HUMAN 100 [IU]/ML
5 INJECTION, SOLUTION SUBCUTANEOUS ONCE
Refills: 0 | Status: COMPLETED | OUTPATIENT
Start: 2022-06-08 | End: 2022-06-08

## 2022-06-08 RX ORDER — SODIUM POLYSTYRENE SULFONATE 4.1 MEQ/G
30 POWDER, FOR SUSPENSION ORAL ONCE
Refills: 0 | Status: DISCONTINUED | OUTPATIENT
Start: 2022-06-08 | End: 2022-06-08

## 2022-06-08 RX ORDER — SIMVASTATIN 20 MG/1
1 TABLET, FILM COATED ORAL
Qty: 0 | Refills: 0 | DISCHARGE

## 2022-06-08 RX ORDER — GLUCAGON INJECTION, SOLUTION 0.5 MG/.1ML
1 INJECTION, SOLUTION SUBCUTANEOUS ONCE
Refills: 0 | Status: DISCONTINUED | OUTPATIENT
Start: 2022-06-08 | End: 2022-06-11

## 2022-06-08 RX ORDER — ALBUTEROL 90 UG/1
2.5 AEROSOL, METERED ORAL ONCE
Refills: 0 | Status: COMPLETED | OUTPATIENT
Start: 2022-06-08 | End: 2022-06-08

## 2022-06-08 RX ORDER — DORZOLAMIDE HYDROCHLORIDE TIMOLOL MALEATE 20; 5 MG/ML; MG/ML
1 SOLUTION/ DROPS OPHTHALMIC
Refills: 0 | Status: DISCONTINUED | OUTPATIENT
Start: 2022-06-08 | End: 2022-06-11

## 2022-06-08 RX ORDER — SODIUM CHLORIDE 9 MG/ML
1000 INJECTION, SOLUTION INTRAVENOUS
Refills: 0 | Status: DISCONTINUED | OUTPATIENT
Start: 2022-06-08 | End: 2022-06-11

## 2022-06-08 RX ORDER — PIPERACILLIN AND TAZOBACTAM 4; .5 G/20ML; G/20ML
3.38 INJECTION, POWDER, LYOPHILIZED, FOR SOLUTION INTRAVENOUS ONCE
Refills: 0 | Status: COMPLETED | OUTPATIENT
Start: 2022-06-08 | End: 2022-06-08

## 2022-06-08 RX ORDER — CALCIUM GLUCONATE 100 MG/ML
1 VIAL (ML) INTRAVENOUS ONCE
Refills: 0 | Status: COMPLETED | OUTPATIENT
Start: 2022-06-08 | End: 2022-06-08

## 2022-06-08 RX ORDER — SODIUM CHLORIDE 9 MG/ML
1000 INJECTION INTRAMUSCULAR; INTRAVENOUS; SUBCUTANEOUS ONCE
Refills: 0 | Status: COMPLETED | OUTPATIENT
Start: 2022-06-08 | End: 2022-06-08

## 2022-06-08 RX ORDER — SODIUM CHLORIDE 9 MG/ML
1000 INJECTION INTRAMUSCULAR; INTRAVENOUS; SUBCUTANEOUS
Refills: 0 | Status: DISCONTINUED | OUTPATIENT
Start: 2022-06-08 | End: 2022-06-11

## 2022-06-08 RX ORDER — SODIUM CHLORIDE 9 MG/ML
1000 INJECTION, SOLUTION INTRAVENOUS ONCE
Refills: 0 | Status: COMPLETED | OUTPATIENT
Start: 2022-06-08 | End: 2022-06-08

## 2022-06-08 RX ORDER — NOREPINEPHRINE BITARTRATE/D5W 8 MG/250ML
0.05 PLASTIC BAG, INJECTION (ML) INTRAVENOUS
Qty: 8 | Refills: 0 | Status: DISCONTINUED | OUTPATIENT
Start: 2022-06-08 | End: 2022-06-08

## 2022-06-08 RX ORDER — PANTOPRAZOLE SODIUM 20 MG/1
40 TABLET, DELAYED RELEASE ORAL DAILY
Refills: 0 | Status: DISCONTINUED | OUTPATIENT
Start: 2022-06-08 | End: 2022-06-09

## 2022-06-08 RX ADMIN — SODIUM CHLORIDE 75 MILLILITER(S): 9 INJECTION INTRAMUSCULAR; INTRAVENOUS; SUBCUTANEOUS at 22:06

## 2022-06-08 RX ADMIN — Medication 100 GRAM(S): at 17:22

## 2022-06-08 RX ADMIN — ALBUTEROL 2.5 MILLIGRAM(S): 90 AEROSOL, METERED ORAL at 17:21

## 2022-06-08 RX ADMIN — Medication 250 MILLIGRAM(S): at 16:43

## 2022-06-08 RX ADMIN — DORZOLAMIDE HYDROCHLORIDE TIMOLOL MALEATE 1 DROP(S): 20; 5 SOLUTION/ DROPS OPHTHALMIC at 22:06

## 2022-06-08 RX ADMIN — SODIUM CHLORIDE 1000 MILLILITER(S): 9 INJECTION, SOLUTION INTRAVENOUS at 15:59

## 2022-06-08 RX ADMIN — INSULIN HUMAN 5 UNIT(S): 100 INJECTION, SOLUTION SUBCUTANEOUS at 16:30

## 2022-06-08 RX ADMIN — PIPERACILLIN AND TAZOBACTAM 25 GRAM(S): 4; .5 INJECTION, POWDER, LYOPHILIZED, FOR SOLUTION INTRAVENOUS at 22:06

## 2022-06-08 RX ADMIN — PIPERACILLIN AND TAZOBACTAM 200 GRAM(S): 4; .5 INJECTION, POWDER, LYOPHILIZED, FOR SOLUTION INTRAVENOUS at 15:50

## 2022-06-08 RX ADMIN — PIPERACILLIN AND TAZOBACTAM 3.38 GRAM(S): 4; .5 INJECTION, POWDER, LYOPHILIZED, FOR SOLUTION INTRAVENOUS at 16:20

## 2022-06-08 RX ADMIN — SODIUM CHLORIDE 1000 MILLILITER(S): 9 INJECTION INTRAMUSCULAR; INTRAVENOUS; SUBCUTANEOUS at 16:00

## 2022-06-08 NOTE — ED PROVIDER NOTE - OBJECTIVE STATEMENT
89M with a history of hypertension, diabetes, gastric mass, and GERD who presents with AMS. Pt discharged from hospital mid-day yesterday. Pt woke up this morning and per wife had breakfast, was talking, looking "good." Then later noted to be "limp" and less responsive. At bedside, initial , HR in low 40s, and initial BP undetectable. Pt also noted to have dyspnea/abdominal breathing. Pt unable to provide a history. Hx provided by EMR and patient's wife. 89M with a history of hypertension, diabetes, gastric mass, and GERD who presents with AMS. Pt discharged from hospital mid-day yesterday. Pt woke up this morning and per wife had breakfast, was talking, looking "good." Then later noted to be "limp" and less responsive. At bedside, initial , HR in low 40s, and initial BP undetectable. Pt also noted to have dyspnea/abdominal breathing. Pt unable to provide a history but states his name to wife correctly. Hx provided by EMR and patient's wife. 89M with a history of hypertension, atrial fibrillation, diabetes, gastric mass, and GERD who presents with AMS. Pt discharged from hospital mid-day yesterday. Pt woke up this morning and per wife had breakfast, was talking, looking "good." Then later noted to be "limp" and less responsive. At bedside, initial , HR in low 40s, and initial BP undetectable. Pt also noted to have dyspnea/abdominal breathing. Pt unable to provide a history but states his name to wife correctly. Hx provided by EMR and patient's wife. Of note, pt started on both diltiazem and metoprolol at time of discharge. Dtr has MOLST which confirms DNR/DNI at bedside. 89M with a history of hypertension, atrial fibrillation, diabetes, gastric mass, and GERD who presents with AMS. Pt discharged from hospital mid-day yesterday. Pt woke up this morning and per wife had breakfast, was talking, looking "good." Then later noted to be "limp" and less responsive. At bedside, initial , HR in low 40s, and initial BP undetectable. Pt also noted to have dyspnea/abdominal breathing. Pt unable to provide a history but states his name to wife correctly. Hx provided by EMR and patient's wife. Of note, pt started on both diltiazem and metoprolol at time of discharge (lisinopril was discontinued). Dtr has MOLST which confirms DNR/DNI at bedside.

## 2022-06-08 NOTE — ED PROVIDER NOTE - ATTENDING CONTRIBUTION TO CARE
Cecile ROGER- 89 Y/O F with h/o htn, dm, afib on AC and recently admitted for urinary retention, metabolic acidosis, arf and sepsis sent home yesterday from the hospital and had  breakfast this morning and has conversation with his family and vns came to visit and he went limp. Pt became unresponsive and was found to be bradycardic and hypotensive, given atropine by ems and brought to ED. Daughter at bedside and she states that he is DNR/ DNI and has MOLST form with her.    Pt is unresponsive, shallow breathing with long pauses, bradycardic with poor peripheral pulses, poor circulation, noted blood in conley , abd soft, pupil equal but non reactive    daughter was told about the critical nature and he probably will decline if not supported with intubation as he is using accessory muscles and has long pauses, will check labs, treat like sepsis, give fluids, atropine and reassess

## 2022-06-08 NOTE — ED PROVIDER NOTE - CLINICAL SUMMARY MEDICAL DECISION MAKING FREE TEXT BOX
89M with a history of hypertension, atrial fibrillation, diabetes, gastric mass, and GERD who presents with AMS. Discharged yesterday on multiple new medications. Acute change in mental status mid-morning. Pt bradycardic and hypotensive with EMS; given atropine 0.5 mg via IO en route. On ED arrival requiring IVF, septic workup; will reassess

## 2022-06-08 NOTE — H&P ADULT - ASSESSMENT
The patient is a 89 year old male with a past medical history of hypertension, insulin dependent diabetes mellitus, AFib on xarelto, GIST  and BPH with an indwelling conley who was brought to the ER for altered mental status.    Assessment/Plan:    1. Worsening renal failure with hyperkalemia    2. Acute metabolic encephalopathy    3. Acute hepatic failure    4. Urinary retention with conley in place  - Conley placed in ED    5. Bradycardia  - Recent diagnosis of afib    6. Coagulopathy    7. Diabetes mellitus type 2     Code Status; DNR/DNI- MOLST in place    Guarded prognosis  The patient is a 89 year old male with a past medical history of hypertension, insulin dependent diabetes mellitus, AFib on xarelto, GIST  and BPH with an indwelling conley who was brought to the ER for altered mental status.    Assessment/Plan:    1. Worsening renal failure with hyperkalemia  - Status post 2 liter fluid bolus  - Continue IVF at 75 cc/hr  - Hyperkalemia cocktail given in ED  - Repeat CMP ordered    2. Acute metabolic encephalopathy  - Baseline dementia  - Alert at the time of my examination    3. Acute hepatic failure  - Secondary to hypovolemia  - Status post fluid resuscitation  - Baseline creatinine of 1.2      4. Urinary retention with conley in place  - Conley present on admission changed in ED    5. Bradycardia  - Recent diagnosis of afib  - Hold cardizem and metoprolol  - Telemetry monitoring     6. Coagulopathy  - Hold xarelto repeat PT/INR in am     7. Diabetes mellitus type 2   - Monitor BLS Q6 hours while NPO     Code Status; DNR/DNI- MOLST in place    Guarded prognosis

## 2022-06-08 NOTE — ED PROVIDER NOTE - PROGRESS NOTE DETAILS
Marco: Hyperkalemic to 6.5 on VBG. kayexlate and insulin 5u ordered, awaiting CMP Marco: Pt remained hypotensive after 2L LR. Will start levophed for BP support. Dtr updated. Marco: Pt BP improved to 117/76 after 1.5L Of LR. Will hold off on levophed, awaiting CMP. Called lab who says that CMP is currently running. Awaiting further treatment of hyperkalemia until CMP resulted. Pt will require admission.

## 2022-06-08 NOTE — ED ADULT NURSE REASSESSMENT NOTE - NS ED NURSE REASSESS COMMENT FT1
Pt family states "he look sbetter, hes talking now". Pt hemodynamically stable. Pt conley changed as per MD Dunbar. Pt awaiting admit bed. Pt provided blanket for comfort.

## 2022-06-08 NOTE — ED ADULT NURSE NOTE - NS PRO AD PATIENT TYPE ON CHART
Do Not Resuscitate (DNR)/Medical Orders for Life-Sustaining Treatment (MOLST) decreased strength/impaired balance

## 2022-06-08 NOTE — ED PROVIDER NOTE - PHYSICAL EXAMINATION
General: minimally responsive, eyes closed, responds to painful stimuli and states name to family   Head: NC, AT  EENT: no scleral icterus  Cardiac: bradycardic, no apparent murmurs  Respiratory: CTA anteriorly, +diaphragmatic breathing   Abdomen: soft, slight distension, non-tender, nonperitonitic  MSK/Vascular: warm extremities, contracted UE b/l   Neuro: GCS15, sensation to light touch intact  Psych: calm, cooperative

## 2022-06-08 NOTE — ED ADULT TRIAGE NOTE - CHIEF COMPLAINT QUOTE
Pt BIBA responsive to pain.  As per EMS received call for hyperglycemia.  Found pt unconscious.  HR 34, BP 70/40.  Pt has I/O in L shoulder from EMS.  Received 0.5 atropine. Pt moved to CC.  Dr. Huber called to bedside.

## 2022-06-08 NOTE — H&P ADULT - HISTORY OF PRESENT ILLNESS
The patient is a 89 year old male with a past medical history of hypertension, insulin dependent diabetes mellitus, AFib on xarelto, GIST  and BPH with an indwelling conley who was brought to the ER for altered mental status. Patient was discharged from [facility] yesterday after being admitted for new onset afib, acute toxic metabolic encephalopathy and VINCE with urinary retention. He was seen by palliative care, DNR/DNI however family refused Hospice on discharge. Was doing well this morning however progressively throughout the day family noted patient became more lethargic and then unresponsive. EMS found patient to be bradycardic in the 40s given atropine. In the Er, hypotensive bradycardic with worsening renal failure, hyperkalemia and acute hepatic failure. Was given 2 liters of fluid, hyperkalemia cocktail with insulin, albuterol, Kayexalate and calcium gluconate. Empiric IV Vancomycin and Zosyn given x 1. Chest xray was negative. Conley changed in the ED and UA sent.

## 2022-06-08 NOTE — ED ADULT NURSE NOTE - OBJECTIVE STATEMENT
Pt with PMHx of HTN, afib, gastric mass, DM, GERD presents for AMS. Pt family states pt was fine this morning and after lunch became less responsive. Pt on arrival was bradycardic, hypotensive, hyperglycemic. Pt started on IVF and abx, afebrile. Pt became more alert and conversing with family bedside. Pt arrived with conley that was placed yesterday. Conley changed again. Pt is DNR/DNI.

## 2022-06-08 NOTE — ED ADULT NURSE NOTE - NSFALLRSKUNASSIST_ED_ALL_ED
Get progress report  Had clipping yesterday by ent  Ask about speed of feeds, time per side and supplementation  Stools and voids   no

## 2022-06-08 NOTE — ED PROVIDER NOTE - CARE PLAN
1 Principal Discharge DX:	Hyperkalemia  Secondary Diagnosis:	Acute renal failure  Secondary Diagnosis:	Liver failure  Secondary Diagnosis:	Bradycardia

## 2022-06-09 LAB
ALBUMIN SERPL ELPH-MCNC: 3.2 G/DL — LOW (ref 3.3–5.2)
ALP SERPL-CCNC: 329 U/L — HIGH (ref 40–120)
ALT FLD-CCNC: 707 U/L — HIGH
ANION GAP SERPL CALC-SCNC: 12 MMOL/L — SIGNIFICANT CHANGE UP (ref 5–17)
AST SERPL-CCNC: 541 U/L — HIGH
BILIRUB SERPL-MCNC: 0.6 MG/DL — SIGNIFICANT CHANGE UP (ref 0.4–2)
BUN SERPL-MCNC: 29.9 MG/DL — HIGH (ref 8–20)
CALCIUM SERPL-MCNC: 9.5 MG/DL — SIGNIFICANT CHANGE UP (ref 8.6–10.2)
CHLORIDE SERPL-SCNC: 100 MMOL/L — SIGNIFICANT CHANGE UP (ref 98–107)
CO2 SERPL-SCNC: 21 MMOL/L — LOW (ref 22–29)
CREAT SERPL-MCNC: 1.75 MG/DL — HIGH (ref 0.5–1.3)
CULTURE RESULTS: SIGNIFICANT CHANGE UP
EGFR: 35 ML/MIN/1.73M2 — LOW
GLUCOSE BLDC GLUCOMTR-MCNC: 126 MG/DL — HIGH (ref 70–99)
GLUCOSE BLDC GLUCOMTR-MCNC: 308 MG/DL — HIGH (ref 70–99)
GLUCOSE SERPL-MCNC: 272 MG/DL — HIGH (ref 70–99)
HCT VFR BLD CALC: 27.6 % — LOW (ref 39–50)
HGB BLD-MCNC: 8.9 G/DL — LOW (ref 13–17)
INR BLD: 1.94 RATIO — HIGH (ref 0.88–1.16)
MCHC RBC-ENTMCNC: 28.3 PG — SIGNIFICANT CHANGE UP (ref 27–34)
MCHC RBC-ENTMCNC: 32.2 GM/DL — SIGNIFICANT CHANGE UP (ref 32–36)
MCV RBC AUTO: 87.6 FL — SIGNIFICANT CHANGE UP (ref 80–100)
PLATELET # BLD AUTO: 190 K/UL — SIGNIFICANT CHANGE UP (ref 150–400)
POTASSIUM SERPL-MCNC: 5 MMOL/L — SIGNIFICANT CHANGE UP (ref 3.5–5.3)
POTASSIUM SERPL-SCNC: 5 MMOL/L — SIGNIFICANT CHANGE UP (ref 3.5–5.3)
PROT SERPL-MCNC: 6.4 G/DL — LOW (ref 6.6–8.7)
PROTHROM AB SERPL-ACNC: 22.6 SEC — HIGH (ref 10.5–13.4)
RBC # BLD: 3.15 M/UL — LOW (ref 4.2–5.8)
RBC # FLD: 13.2 % — SIGNIFICANT CHANGE UP (ref 10.3–14.5)
SODIUM SERPL-SCNC: 133 MMOL/L — LOW (ref 135–145)
SPECIMEN SOURCE: SIGNIFICANT CHANGE UP
WBC # BLD: 8.51 K/UL — SIGNIFICANT CHANGE UP (ref 3.8–10.5)
WBC # FLD AUTO: 8.51 K/UL — SIGNIFICANT CHANGE UP (ref 3.8–10.5)

## 2022-06-09 PROCEDURE — 99223 1ST HOSP IP/OBS HIGH 75: CPT

## 2022-06-09 PROCEDURE — 99233 SBSQ HOSP IP/OBS HIGH 50: CPT

## 2022-06-09 PROCEDURE — 70450 CT HEAD/BRAIN W/O DYE: CPT | Mod: 26

## 2022-06-09 RX ORDER — PANTOPRAZOLE SODIUM 20 MG/1
40 TABLET, DELAYED RELEASE ORAL
Refills: 0 | Status: DISCONTINUED | OUTPATIENT
Start: 2022-06-09 | End: 2022-06-11

## 2022-06-09 RX ORDER — TAMSULOSIN HYDROCHLORIDE 0.4 MG/1
0.4 CAPSULE ORAL AT BEDTIME
Refills: 0 | Status: DISCONTINUED | OUTPATIENT
Start: 2022-06-09 | End: 2022-06-11

## 2022-06-09 RX ORDER — DILTIAZEM HCL 120 MG
60 CAPSULE, EXT RELEASE 24 HR ORAL EVERY 8 HOURS
Refills: 0 | Status: DISCONTINUED | OUTPATIENT
Start: 2022-06-09 | End: 2022-06-09

## 2022-06-09 RX ORDER — INSULIN LISPRO 100/ML
VIAL (ML) SUBCUTANEOUS
Refills: 0 | Status: DISCONTINUED | OUTPATIENT
Start: 2022-06-09 | End: 2022-06-11

## 2022-06-09 RX ORDER — ASPIRIN/CALCIUM CARB/MAGNESIUM 324 MG
81 TABLET ORAL DAILY
Refills: 0 | Status: DISCONTINUED | OUTPATIENT
Start: 2022-06-09 | End: 2022-06-11

## 2022-06-09 RX ORDER — RIVAROXABAN 15 MG-20MG
20 KIT ORAL
Refills: 0 | Status: DISCONTINUED | OUTPATIENT
Start: 2022-06-09 | End: 2022-06-11

## 2022-06-09 RX ORDER — INSULIN LISPRO 100/ML
4 VIAL (ML) SUBCUTANEOUS ONCE
Refills: 0 | Status: COMPLETED | OUTPATIENT
Start: 2022-06-09 | End: 2022-06-09

## 2022-06-09 RX ORDER — INSULIN GLARGINE 100 [IU]/ML
5 INJECTION, SOLUTION SUBCUTANEOUS AT BEDTIME
Refills: 0 | Status: DISCONTINUED | OUTPATIENT
Start: 2022-06-09 | End: 2022-06-11

## 2022-06-09 RX ORDER — DILTIAZEM HCL 120 MG
60 CAPSULE, EXT RELEASE 24 HR ORAL EVERY 8 HOURS
Refills: 0 | Status: DISCONTINUED | OUTPATIENT
Start: 2022-06-09 | End: 2022-06-11

## 2022-06-09 RX ADMIN — PIPERACILLIN AND TAZOBACTAM 25 GRAM(S): 4; .5 INJECTION, POWDER, LYOPHILIZED, FOR SOLUTION INTRAVENOUS at 15:39

## 2022-06-09 RX ADMIN — PIPERACILLIN AND TAZOBACTAM 25 GRAM(S): 4; .5 INJECTION, POWDER, LYOPHILIZED, FOR SOLUTION INTRAVENOUS at 07:14

## 2022-06-09 RX ADMIN — TAMSULOSIN HYDROCHLORIDE 0.4 MILLIGRAM(S): 0.4 CAPSULE ORAL at 23:04

## 2022-06-09 RX ADMIN — DORZOLAMIDE HYDROCHLORIDE TIMOLOL MALEATE 1 DROP(S): 20; 5 SOLUTION/ DROPS OPHTHALMIC at 18:23

## 2022-06-09 RX ADMIN — Medication 81 MILLIGRAM(S): at 12:02

## 2022-06-09 RX ADMIN — DORZOLAMIDE HYDROCHLORIDE TIMOLOL MALEATE 1 DROP(S): 20; 5 SOLUTION/ DROPS OPHTHALMIC at 07:14

## 2022-06-09 RX ADMIN — INSULIN GLARGINE 5 UNIT(S): 100 INJECTION, SOLUTION SUBCUTANEOUS at 23:16

## 2022-06-09 RX ADMIN — RIVAROXABAN 20 MILLIGRAM(S): KIT at 18:22

## 2022-06-09 RX ADMIN — Medication 60 MILLIGRAM(S): at 12:02

## 2022-06-09 RX ADMIN — PIPERACILLIN AND TAZOBACTAM 25 GRAM(S): 4; .5 INJECTION, POWDER, LYOPHILIZED, FOR SOLUTION INTRAVENOUS at 23:08

## 2022-06-09 RX ADMIN — Medication 4 UNIT(S): at 23:40

## 2022-06-09 RX ADMIN — SODIUM CHLORIDE 75 MILLILITER(S): 9 INJECTION INTRAMUSCULAR; INTRAVENOUS; SUBCUTANEOUS at 07:14

## 2022-06-09 RX ADMIN — Medication 60 MILLIGRAM(S): at 23:04

## 2022-06-09 NOTE — CONSULT NOTE ADULT - ASSESSMENT
89 yo frail elderly Male readmitted within 24hrs with AMS Bradycardia, Acute Renal and Liver Failure    AMS Metabolic Encephalopathy  Bradycardia  Hypotensive Hypernatremia  Hyperkalemia  Acute Hepatic failure  Delirium    Hyperkalemia  5.0 today early am  Electrolytes improving      Acute Renal Failure  Gentle Hydration  Follow Trends of Renal Indicies  BUN 29.9 and Cr 1.75 improving  GFR 35 and rising  Avoid nephrotoxic meds  [facility] changed and urine sent to lab  +Hematuria    Acute Hepatic Failure  Labs:  Alk Phos 329 climbing  AST  541   climbing  T Bili 0.6    [facility]  Acute Medical Care  Retaining DNR/DNI  Asked to bring in MOLST Directive

## 2022-06-09 NOTE — PROGRESS NOTE ADULT - ASSESSMENT
The patient is a 89 year old male with a past medical history of hypertension, insulin dependent diabetes mellitus, AFib on xarelto, GIST  and BPH with an indwelling conley who was brought to the ER for altered mental status.    Assessment/Plan:    1. Worsening renal failure with hyperkalemia  - Improving  - Status post 2 liter fluid bolus  - Continue IVF at 75 cc/hr  - Hyperkalemia cocktail given in ED- K Now WNL  - MOnitor BMP  - Baseline creatinine of 1.2     2. Acute metabolic encephalopathy  - Baseline dementia  - Alert at the time of my examination. AOX1 at baseline     3. Acute hepatic failure  - Secondary to hypovolemia  - Status post fluid resuscitation  - Improving       4. Urinary retention with conley in place  - Conley present on admission changed in ED  - Continue flomax     5. Bradycardia  - Now resolved   Now in AFIb with RVR  - Recent diagnosis of afib  - RESume cardizem with parameters   - Telemetry monitoring     6. Coagulopathy  - PT/INR improved  - REsume xarelto     7. Diabetes mellitus type 2   - Admelog sliding scale  - Lantus QHS   - MOnitor bSL       8/ Dysphagia  - REsume previous diet soft bite sized with mildly thick fluid     Code Status; DNR/DNI- MOLST in place    Guarded prognosis     Palliative care consult  The patient is a 89 year old male with a past medical history of hypertension, insulin dependent diabetes mellitus, AFib on xarelto, GIST  and BPH with an indwelling conley who was brought to the ER for altered mental status.    Assessment/Plan:    1. Worsening renal failure with hyperkalemia  - Improving  - Status post 2 liter fluid bolus  - Continue IVF at 75 cc/hr  - Hyperkalemia cocktail given in ED- K Now WNL  - MOnitor BMP  - Baseline creatinine of 1.2     2. Acute metabolic encephalopathy  - Baseline dementia  - Alert at the time of my examination. AOX1 at baseline     3. Acute hepatic failure  - Secondary to hypovolemia  - Status post fluid resuscitation  - Improving     4. Urinary retention with conley in place  - Conley present on admission changed in ED  - Continue flomax     5. Bradycardia  - Now resolved   Now in AFIb with RVR  - Recent diagnosis of afib  - RESume cardizem with parameters   - Telemetry monitoring     6. Coagulopathy  - PT/INR improved  - REsume xarelto     7. Diabetes mellitus type 2   - Admelog sliding scale  - Lantus QHS   - MOnitor bSL     8. Dysphagia  - REsume previous diet soft bite sized with mildly thick fluid     Code Status; DNR/DNI- MOLST in place    Guarded prognosis     Palliative care consult     Plan of care discussed with patient's daughter Lisset in detail

## 2022-06-09 NOTE — PROGRESS NOTE ADULT - SUBJECTIVE AND OBJECTIVE BOX
CC: Follow up     INTERVAL HPI/OVERNIGHT EVENTS: Patient seen and examined, awake alert to self. BP improved. Afib with rvr hr in the 110s. Afebrile.     Vital Signs Last 24 Hrs  T(C): 36.4 (2022 11:03), Max: 36.9 (2022 07:49)  T(F): 97.5 (2022 11:03), Max: 98.5 (2022 07:49)  HR: 120 (2022 11:03) (49 - 120)  BP: 138/89 (2022 11:03) (69/45 - 142/97)  BP(mean): --  RR: 16 (2022 11:03) (15 - 18)  SpO2: 98% (2022 11:03) (98% - 99%)    PHYSICAL EXAM:    GENERAL: NAD, AOX1  HEAD:  Atraumatic, Normocephalic  EYES: , conjunctiva and sclera clear  ENMT: Moist mucous membranes  NECK: Supple  CHEST/LUNG: Clear to auscultation bilaterally; No rales, rhonchi, wheezing, or rubs  HEART: IRR ; No murmurs, rubs, or gallops  ABDOMEN: Soft, Nontender, Nondistended; Bowel sounds present  EXTREMITIES:  2+ Peripheral Pulses, No clubbing, cyanosis, or edema        MEDICATIONS  (STANDING):  aspirin enteric coated 81 milliGRAM(s) Oral daily  dextrose 5%. 1000 milliLiter(s) (100 mL/Hr) IV Continuous <Continuous>  dextrose 5%. 1000 milliLiter(s) (50 mL/Hr) IV Continuous <Continuous>  dextrose 50% Injectable 25 Gram(s) IV Push once  dextrose 50% Injectable 12.5 Gram(s) IV Push once  dextrose 50% Injectable 25 Gram(s) IV Push once  diltiazem    Tablet 60 milliGRAM(s) Oral every 8 hours  dorzolamide 2%/timolol 0.5% Ophthalmic Solution 1 Drop(s) Both EYES two times a day  glucagon  Injectable 1 milliGRAM(s) IntraMuscular once  insulin glargine Injectable (LANTUS) 5 Unit(s) SubCutaneous at bedtime  insulin lispro (ADMELOG) corrective regimen sliding scale   SubCutaneous three times a day before meals  pantoprazole    Tablet 40 milliGRAM(s) Oral before breakfast  piperacillin/tazobactam IVPB.. 3.375 Gram(s) IV Intermittent every 8 hours  rivaroxaban 20 milliGRAM(s) Oral with dinner  sodium chloride 0.9%. 1000 milliLiter(s) (75 mL/Hr) IV Continuous <Continuous>  tamsulosin 0.4 milliGRAM(s) Oral at bedtime    MEDICATIONS  (PRN):  dextrose Oral Gel 15 Gram(s) Oral once PRN Blood Glucose LESS THAN 70 milliGRAM(s)/deciliter      Allergies    No Known Allergies    Intolerances          LABS:                          8.9    8.51  )-----------( 190      ( 2022 03:08 )             27.6     06-09    133<L>  |  100  |  29.9<H>  ----------------------------<  272<H>  5.0   |  21.0<L>  |  1.75<H>    Ca    9.5      2022 03:08  Phos  3.3     06-08  Mg     2.2     06-08    TPro  6.4<L>  /  Alb  3.2<L>  /  TBili  0.6  /  DBili  x   /  AST  541<H>  /  ALT  707<H>  /  AlkPhos  329<H>  -    PT/INR - ( 2022 09:07 )   PT: 22.6 sec;   INR: 1.94 ratio         PTT - ( 2022 15:51 )  PTT:38.2 sec  Urinalysis Basic - ( 2022 17:19 )    Color: Brown / Appearance: Slightly Turbid / S.020 / pH: x  Gluc: x / Ketone: Negative  / Bili: Negative / Urobili: Negative mg/dL   Blood: x / Protein: 100 / Nitrite: Negative   Leuk Esterase: Moderate / RBC: >50 /HPF / WBC 6-10 /HPF   Sq Epi: x / Non Sq Epi: Few / Bacteria: Few        RADIOLOGY & ADDITIONAL TESTS:

## 2022-06-09 NOTE — CONSULT NOTE ADULT - SUBJECTIVE AND OBJECTIVE BOX
This is a 89 yo Creole speaking Male, PMH HTN, IDDM, AFIB on Xarelto, GIST and BPH with indwelling catheter insitu, readmitted within 24 hrs of being discharged from [facility] with new onset AFIB, toxic encephalopathy and TYRONE due to urinary retention. Family had refused Hospice support upon discharge, had designated his Code Status as DNR/DNI on MOLST. Ambulance called when his mental status declined to the point he became unresponsive. Bradycardic in 40's needing atropine from EMS. In ED he had hypotension, Bradycardia had continued, worsening renal failure with  Hyperkalemia and acute Hepatic Failure. Conley changed in ED UA sent to lab. When I seen Patient in ED he was sleeping arouseable, A&O x 1. Calm and cooperative for time being.  Daughter spoke with Dr. Kraft, who wants her Father to be treated for acute cardiac, organ system failure. No heroic means of prolonging life is DNR/DNI. No fever or chills, Zosyn IV ordered x 7 days empirically.  HPI:  The patient is a 89 year old male with a past medical history of hypertension, insulin dependent diabetes mellitus, AFib on xarelto, GIST  and BPH with an indwelling conley who was brought to the ER for altered mental status. Patient was discharged from [facility] yesterday after being admitted for new onset afib, acute toxic metabolic encephalopathy and VINCE with urinary retention. He was seen by palliative care, DNR/DNI however family refused Hospice on discharge. Was doing well this morning however progressively throughout the day family noted patient became more lethargic and then unresponsive. EMS found patient to be bradycardic in the 40s given atropine. In the Er, hypotensive bradycardic with worsening renal failure, hyperkalemia and acute hepatic failure. Was given 2 liters of fluid, hyperkalemia cocktail with insulin, albuterol, Kayexalate and calcium gluconate. Empiric IV Vancomycin and Zosyn given x 1. Chest xray was negative. Conley changed in the ED and UA sent.       (2022 17:54)      PERTINENT PMH REVIEWED: Yes     PAST MEDICAL & SURGICAL HISTORY:  Diabetes    A-fib    Hypertension    Neuropathy    No significant past surgical history    SOCIAL HISTORY:                      Substance history: none                    Admitted from:  home                      Moravian/spirituality:                    Cultural concerns: Creole language barrier                      HCP-Daughter Lisset Oneal  [phone]    FAMILY HISTORY:  No pertinent family history in first degree relatives    No Known Allergies    Intolerances    ADVANCE DIRECTIVES/TREATMENT PREFERENCES:    DNR/DNI - MOLST, continue all other medical treatments    Baseline ADLs (prior to admission):  Dependent      Karnofsky/Palliative Performance Status Version 20:  %    Present Symptoms:     Dyspnea: none  Nausea/Vomiting:  No  Anxiety:  Yes   Depression: No  Fatigue: Yes   Loss of appetite: Yes   Constipation: ?    Pain: Neuropathy            Character-            Duration-            Effect-            Factors-            Frequency-            Location-            Severity-    Review of Systems: Reviewed      Unable to obtain due to poor mentation       MEDICATIONS  (STANDING):  aspirin enteric coated 81 milliGRAM(s) Oral daily  dextrose 5%. 1000 milliLiter(s) (100 mL/Hr) IV Continuous <Continuous>  dextrose 5%. 1000 milliLiter(s) (50 mL/Hr) IV Continuous <Continuous>  dextrose 50% Injectable 25 Gram(s) IV Push once  dextrose 50% Injectable 12.5 Gram(s) IV Push once  dextrose 50% Injectable 25 Gram(s) IV Push once  diltiazem    Tablet 60 milliGRAM(s) Oral every 8 hours  dorzolamide 2%/timolol 0.5% Ophthalmic Solution 1 Drop(s) Both EYES two times a day  glucagon  Injectable 1 milliGRAM(s) IntraMuscular once  insulin glargine Injectable (LANTUS) 5 Unit(s) SubCutaneous at bedtime  insulin lispro (ADMELOG) corrective regimen sliding scale   SubCutaneous three times a day before meals  pantoprazole    Tablet 40 milliGRAM(s) Oral before breakfast  piperacillin/tazobactam IVPB.. 3.375 Gram(s) IV Intermittent every 8 hours  rivaroxaban 20 milliGRAM(s) Oral with dinner  sodium chloride 0.9%. 1000 milliLiter(s) (75 mL/Hr) IV Continuous <Continuous>  tamsulosin 0.4 milliGRAM(s) Oral at bedtime    MEDICATIONS  (PRN):  dextrose Oral Gel 15 Gram(s) Oral once PRN Blood Glucose LESS THAN 70 milliGRAM(s)/deciliter    PHYSICAL EXAM:    Vital Signs Last 24 Hrs  T(C): 36.4 (2022 11:03), Max: 36.9 (2022 07:49)  T(F): 97.5 (2022 11:03), Max: 98.5 (2022 07:49)  HR: 120 (2022 11:03) (49 - 120)  BP: 138/89 (2022 11:03) (81/52 - 142/97)  BP(mean): --  RR: 16 (2022 11:03) (16 - 18)  SpO2: 98% (2022 11:03) (98% - 99%)    General: alert  oriented x _1__sleepy confused              nonverbal      HEENT: normal      Lungs: comfortable    CV:   tachycardia    GI: normal           constipation  last BM:     :   conley yellow urine    MSK:  weakness             bedbound/wheelchair bound    Neuro:  cognitive impairment    Skin: _  no rash    LABS:                        8.9    8.51  )-----------( 190      ( 2022 03:08 )             27.6     06-09    133<L>  |  100  |  29.9<H>  ----------------------------<  272<H>  5.0   |  21.0<L>  |  1.75<H>    Ca    9.5      2022 03:08  Phos  3.3     06-08  Mg     2.2     06-08    TPro  6.4<L>  /  Alb  3.2<L>  /  TBili  0.6  /  DBili  x   /  AST  541<H>  /  ALT  707<H>  /  AlkPhos  329<H>  06-09    PT/INR - ( 2022 09:07 )   PT: 22.6 sec;   INR: 1.94 ratio         PTT - ( 2022 15:51 )  PTT:38.2 sec  Urinalysis Basic - ( 2022 17:19 )    Color: Brown / Appearance: Slightly Turbid / S.020 / pH: x  Gluc: x / Ketone: Negative  / Bili: Negative / Urobili: Negative mg/dL   Blood: x / Protein: 100 / Nitrite: Negative   Leuk Esterase: Moderate / RBC: >50 /HPF / WBC 6-10 /HPF   Sq Epi: x / Non Sq Epi: Few / Bacteria: Few    I&O's Summary    RADIOLOGY & ADDITIONAL STUDIES:  < from: Xray Chest 1 View- PORTABLE-Urgent (06.08.22 @ 16:24) >  PULMONARY: The visualized lungs are clear of airspace consolidations or   effusions.   No pneumothorax.    HEART/VASCULAR:The heart is mildly enlarged in transverse diameter. .   Tortuous RIGHT para superior mediastinal brachycephalic vessel opacity..    BONES: Visualized osseous structures are intact.    IMPRESSION:   No radiographic evidence of active chest disease.      < end of copied text >

## 2022-06-10 LAB
ALBUMIN SERPL ELPH-MCNC: 3.3 G/DL — SIGNIFICANT CHANGE UP (ref 3.3–5.2)
ALP SERPL-CCNC: 253 U/L — HIGH (ref 40–120)
ALT FLD-CCNC: 388 U/L — HIGH
ANION GAP SERPL CALC-SCNC: 12 MMOL/L — SIGNIFICANT CHANGE UP (ref 5–17)
AST SERPL-CCNC: 111 U/L — HIGH
BILIRUB SERPL-MCNC: 0.5 MG/DL — SIGNIFICANT CHANGE UP (ref 0.4–2)
BUN SERPL-MCNC: 24.8 MG/DL — HIGH (ref 8–20)
CALCIUM SERPL-MCNC: 9.9 MG/DL — SIGNIFICANT CHANGE UP (ref 8.6–10.2)
CHLORIDE SERPL-SCNC: 104 MMOL/L — SIGNIFICANT CHANGE UP (ref 98–107)
CO2 SERPL-SCNC: 22 MMOL/L — SIGNIFICANT CHANGE UP (ref 22–29)
CREAT SERPL-MCNC: 1.58 MG/DL — HIGH (ref 0.5–1.3)
EGFR: 39 ML/MIN/1.73M2 — LOW
GLUCOSE BLDC GLUCOMTR-MCNC: 107 MG/DL — HIGH (ref 70–99)
GLUCOSE BLDC GLUCOMTR-MCNC: 178 MG/DL — HIGH (ref 70–99)
GLUCOSE BLDC GLUCOMTR-MCNC: 213 MG/DL — HIGH (ref 70–99)
GLUCOSE SERPL-MCNC: 182 MG/DL — HIGH (ref 70–99)
HCT VFR BLD CALC: 28.6 % — LOW (ref 39–50)
HGB BLD-MCNC: 9.3 G/DL — LOW (ref 13–17)
MAGNESIUM SERPL-MCNC: 1.9 MG/DL — SIGNIFICANT CHANGE UP (ref 1.6–2.6)
MCHC RBC-ENTMCNC: 28.4 PG — SIGNIFICANT CHANGE UP (ref 27–34)
MCHC RBC-ENTMCNC: 32.5 GM/DL — SIGNIFICANT CHANGE UP (ref 32–36)
MCV RBC AUTO: 87.5 FL — SIGNIFICANT CHANGE UP (ref 80–100)
PHOSPHATE SERPL-MCNC: 1.9 MG/DL — LOW (ref 2.4–4.7)
PLATELET # BLD AUTO: 207 K/UL — SIGNIFICANT CHANGE UP (ref 150–400)
POTASSIUM SERPL-MCNC: 4.5 MMOL/L — SIGNIFICANT CHANGE UP (ref 3.5–5.3)
POTASSIUM SERPL-SCNC: 4.5 MMOL/L — SIGNIFICANT CHANGE UP (ref 3.5–5.3)
PROT SERPL-MCNC: 6.3 G/DL — LOW (ref 6.6–8.7)
RBC # BLD: 3.27 M/UL — LOW (ref 4.2–5.8)
RBC # FLD: 13.1 % — SIGNIFICANT CHANGE UP (ref 10.3–14.5)
SODIUM SERPL-SCNC: 137 MMOL/L — SIGNIFICANT CHANGE UP (ref 135–145)
WBC # BLD: 6.55 K/UL — SIGNIFICANT CHANGE UP (ref 3.8–10.5)
WBC # FLD AUTO: 6.55 K/UL — SIGNIFICANT CHANGE UP (ref 3.8–10.5)

## 2022-06-10 PROCEDURE — 99233 SBSQ HOSP IP/OBS HIGH 50: CPT

## 2022-06-10 PROCEDURE — 93010 ELECTROCARDIOGRAM REPORT: CPT

## 2022-06-10 PROCEDURE — 99497 ADVNCD CARE PLAN 30 MIN: CPT | Mod: 25

## 2022-06-10 RX ORDER — METOPROLOL TARTRATE 50 MG
100 TABLET ORAL
Refills: 0 | Status: DISCONTINUED | OUTPATIENT
Start: 2022-06-10 | End: 2022-06-11

## 2022-06-10 RX ORDER — POTASSIUM PHOSPHATE, MONOBASIC POTASSIUM PHOSPHATE, DIBASIC 236; 224 MG/ML; MG/ML
15 INJECTION, SOLUTION INTRAVENOUS ONCE
Refills: 0 | Status: COMPLETED | OUTPATIENT
Start: 2022-06-10 | End: 2022-06-10

## 2022-06-10 RX ADMIN — Medication 1: at 10:58

## 2022-06-10 RX ADMIN — Medication 100 MILLIGRAM(S): at 17:25

## 2022-06-10 RX ADMIN — SODIUM CHLORIDE 75 MILLILITER(S): 9 INJECTION INTRAMUSCULAR; INTRAVENOUS; SUBCUTANEOUS at 10:46

## 2022-06-10 RX ADMIN — POTASSIUM PHOSPHATE, MONOBASIC POTASSIUM PHOSPHATE, DIBASIC 62.5 MILLIMOLE(S): 236; 224 INJECTION, SOLUTION INTRAVENOUS at 10:47

## 2022-06-10 RX ADMIN — Medication 81 MILLIGRAM(S): at 13:14

## 2022-06-10 RX ADMIN — Medication 60 MILLIGRAM(S): at 13:15

## 2022-06-10 RX ADMIN — RIVAROXABAN 20 MILLIGRAM(S): KIT at 13:14

## 2022-06-10 RX ADMIN — INSULIN GLARGINE 5 UNIT(S): 100 INJECTION, SOLUTION SUBCUTANEOUS at 23:01

## 2022-06-10 RX ADMIN — DORZOLAMIDE HYDROCHLORIDE TIMOLOL MALEATE 1 DROP(S): 20; 5 SOLUTION/ DROPS OPHTHALMIC at 17:21

## 2022-06-10 RX ADMIN — PANTOPRAZOLE SODIUM 40 MILLIGRAM(S): 20 TABLET, DELAYED RELEASE ORAL at 05:34

## 2022-06-10 RX ADMIN — Medication 60 MILLIGRAM(S): at 05:34

## 2022-06-10 RX ADMIN — POTASSIUM PHOSPHATE, MONOBASIC POTASSIUM PHOSPHATE, DIBASIC 62.5 MILLIMOLE(S): 236; 224 INJECTION, SOLUTION INTRAVENOUS at 05:40

## 2022-06-10 RX ADMIN — Medication 100 MILLIGRAM(S): at 10:47

## 2022-06-10 RX ADMIN — TAMSULOSIN HYDROCHLORIDE 0.4 MILLIGRAM(S): 0.4 CAPSULE ORAL at 23:02

## 2022-06-10 NOTE — PROGRESS NOTE ADULT - ASSESSMENT
The patient is a 89 year old male with a past medical history of hypertension, insulin dependent diabetes mellitus, AFib on xarelto, GIST  and BPH with an indwelling conley who was brought to the ER for altered mental status.    Assessment/Plan:    1. Worsening renal failure with hyperkalemia  - Improving  - Status post 2 liter fluid bolus  - Continue IVF at 75 cc/hr  - Hyperkalemia cocktail given in ED- K Now WNL  - MOnitor BMP  - Baseline creatinine of 1.2     2. Acute metabolic encephalopathy  - Baseline dementia  - Alert at the time of my examination. AOX1 at baseline     3. Acute hepatic failure  - Secondary to hypovolemia  - Status post fluid resuscitation  - Improving     4. Urinary retention with conley in place  - Conley present on admission changed in ED  - Continue flomax     5. Bradycardia  - Now resolved- Now in AFIb with RVR  - Recent diagnosis of afib  - Cardizem Po  = Resume Metoprolol PO   - Telemetry monitoring     6. Coagulopathy  - PT/INR improved  - ON  xarelto     7. Diabetes mellitus type 2   - Admelog sliding scale  - Lantus QHS   - MOnitor bSL     8. Dysphagia  - Previous diet soft bite sized with mildly thick fluid     9. Hypophosphatemia  - Supplement Kphos    Code Status; DNR/DNI- MOLST in place    Guarded prognosis       Anticipate discharge in 24 hours if VINCE and HR improving

## 2022-06-10 NOTE — PROGRESS NOTE ADULT - SUBJECTIVE AND OBJECTIVE BOX
CC: Follow up     INTERVAL HPI/OVERNIGHT EVENTS: Patient seen and examined, HR elevated this morning. Awake and alert to self       Vital Signs Last 24 Hrs  T(C): 37.1 (10 Berto 2022 07:20), Max: 37.1 (2022 19:15)  T(F): 98.7 (10 Berto 2022 07:20), Max: 98.8 (2022 19:15)  HR: 124 (10 Berto 2022 07:20) (83 - 124)  BP: 117/71 (10 Berto 2022 07:20) (101/58 - 141/81)  BP(mean): --  RR: 18 (10 Berto 2022 07:20) (18 - 18)  SpO2: 97% (10 Berto 2022 07:20) (93% - 100%)    PHYSICAL EXAM:    GENERAL: NAD, wAOX1  HEAD:  Atraumatic, Normocephalic  EYES: conjunctiva and sclera clear  ENMT: Moist mucous membranes  NECK: Supple  CHEST/LUNG: Clear to auscultation bilaterally; No rales, rhonchi, wheezing, or rubs  HEART: IRR; No murmurs, rubs, or gallops  ABDOMEN: Soft, Nontender, Nondistended; Bowel sounds present  EXTREMITIES:  2+ Peripheral Pulses, No clubbing, cyanosis, or edema        MEDICATIONS  (STANDING):  aspirin enteric coated 81 milliGRAM(s) Oral daily  dextrose 5%. 1000 milliLiter(s) (100 mL/Hr) IV Continuous <Continuous>  dextrose 5%. 1000 milliLiter(s) (50 mL/Hr) IV Continuous <Continuous>  dextrose 50% Injectable 25 Gram(s) IV Push once  dextrose 50% Injectable 12.5 Gram(s) IV Push once  dextrose 50% Injectable 25 Gram(s) IV Push once  diltiazem    Tablet 60 milliGRAM(s) Oral every 8 hours  dorzolamide 2%/timolol 0.5% Ophthalmic Solution 1 Drop(s) Both EYES two times a day  glucagon  Injectable 1 milliGRAM(s) IntraMuscular once  insulin glargine Injectable (LANTUS) 5 Unit(s) SubCutaneous at bedtime  insulin lispro (ADMELOG) corrective regimen sliding scale   SubCutaneous three times a day before meals  metoprolol tartrate 100 milliGRAM(s) Oral two times a day  pantoprazole    Tablet 40 milliGRAM(s) Oral before breakfast  rivaroxaban 20 milliGRAM(s) Oral with dinner  sodium chloride 0.9%. 1000 milliLiter(s) (75 mL/Hr) IV Continuous <Continuous>  tamsulosin 0.4 milliGRAM(s) Oral at bedtime    MEDICATIONS  (PRN):  dextrose Oral Gel 15 Gram(s) Oral once PRN Blood Glucose LESS THAN 70 milliGRAM(s)/deciliter      Allergies    No Known Allergies    Intolerances          LABS:                          9.3    6.55  )-----------( 207      ( 10 Berto 2022 03:09 )             28.6     06-10    137  |  104  |  24.8<H>  ----------------------------<  182<H>  4.5   |  22.0  |  1.58<H>    Ca    9.9      10 Berto 2022 03:09  Phos  1.9     06-10  Mg     1.9     06-10    TPro  6.3<L>  /  Alb  3.3  /  TBili  0.5  /  DBili  x   /  AST  111<H>  /  ALT  388<H>  /  AlkPhos  253<H>  06-10    PT/INR - ( 2022 09:07 )   PT: 22.6 sec;   INR: 1.94 ratio         PTT - ( 2022 15:51 )  PTT:38.2 sec  Urinalysis Basic - ( 2022 17:19 )    Color: Brown / Appearance: Slightly Turbid / S.020 / pH: x  Gluc: x / Ketone: Negative  / Bili: Negative / Urobili: Negative mg/dL   Blood: x / Protein: 100 / Nitrite: Negative   Leuk Esterase: Moderate / RBC: >50 /HPF / WBC 6-10 /HPF   Sq Epi: x / Non Sq Epi: Few / Bacteria: Few        RADIOLOGY & ADDITIONAL TESTS:

## 2022-06-10 NOTE — PROGRESS NOTE ADULT - SUBJECTIVE AND OBJECTIVE BOX
OVERNIGHT EVENTS:  F/U Note  NSVT overnight> AFIB with PVC's  asymptomatic  Patient more awake and alert this morning.  Renal and Liver studies improving  Spoke to daughter-will accept Hospice call for information and possibly more support at home  MOLST DNR/DNI at [facility]    Present Symptoms:     Dyspnea: Mild Moderate Severe  Nausea/Vomiting:  No  Anxiety:  Yes   Depression:  No  Fatigue: Yes   Loss of appetite: Yes No  Constipation:     Pain:             Character-            Duration-            Effect-            Factors-            Frequency-            Location-            Severity-    Pain AD Score:  [email or web address] (press ctrl + left click to view)    Review of Systems: Reviewed                     Negative:                     Positive:  Unable to obtain due to poor mentation   All others negative    MEDICATIONS  (STANDING):  aspirin enteric coated 81 milliGRAM(s) Oral daily  dextrose 5%. 1000 milliLiter(s) (100 mL/Hr) IV Continuous <Continuous>  dextrose 5%. 1000 milliLiter(s) (50 mL/Hr) IV Continuous <Continuous>  dextrose 50% Injectable 25 Gram(s) IV Push once  dextrose 50% Injectable 12.5 Gram(s) IV Push once  dextrose 50% Injectable 25 Gram(s) IV Push once  diltiazem    Tablet 60 milliGRAM(s) Oral every 8 hours  dorzolamide 2%/timolol 0.5% Ophthalmic Solution 1 Drop(s) Both EYES two times a day  glucagon  Injectable 1 milliGRAM(s) IntraMuscular once  insulin glargine Injectable (LANTUS) 5 Unit(s) SubCutaneous at bedtime  insulin lispro (ADMELOG) corrective regimen sliding scale   SubCutaneous three times a day before meals  metoprolol tartrate 100 milliGRAM(s) Oral two times a day  pantoprazole    Tablet 40 milliGRAM(s) Oral before breakfast  rivaroxaban 20 milliGRAM(s) Oral with dinner  sodium chloride 0.9%. 1000 milliLiter(s) (75 mL/Hr) IV Continuous <Continuous>  tamsulosin 0.4 milliGRAM(s) Oral at bedtime    MEDICATIONS  (PRN):  dextrose Oral Gel 15 Gram(s) Oral once PRN Blood Glucose LESS THAN 70 milliGRAM(s)/deciliter      PHYSICAL EXAM:    Vital Signs Last 24 Hrs  T(C): 37.1 (10 Berto 2022 07:20), Max: 37.1 (2022 19:15)  T(F): 98.7 (10 Berto 2022 07:20), Max: 98.8 (2022 19:15)  HR: 124 (10 Berto 2022 07:20) (83 - 124)  BP: 117/71 (10 Berto 2022 07:20) (101/58 - 141/81)  BP(mean): --  RR: 18 (10 Berto 2022 07:20) (18 - 18)  SpO2: 97% (10 Berto 2022 07:20) (93% - 100%)    General: alert  oriented x ____ lethargic agitated                  cachexia  nonverbal  coma    Karnofsky:  %    HEENT: normal  dry mouth  ET tube/trach    Lungs: comfortable tachypnea/labored breathing  excessive secretions    CV: normal  tachycardia    GI: normal  distended  tender  no BS               PEG/NG/OG tube  constipation  last BM:     : normal  incontinent  oliguria/anuria  conley    MSK: normal  weakness  edema             ambulatory  bedbound/wheelchair bound    Skin: normal  pressure ulcers- Stage_____  no rash    LABS:                          9.3    6.55  )-----------( 207      ( 10 Berto 2022 03:09 )             28.6     06-10    137  |  104  |  24.8<H>  ----------------------------<  182<H>  4.5   |  22.0  |  1.58<H>    Ca    9.9      10 Berto 2022 03:09  Phos  1.9     06-10  Mg     1.9     06-10    TPro  6.3<L>  /  Alb  3.3  /  TBili  0.5  /  DBili  x   /  AST  111<H>  /  ALT  388<H>  /  AlkPhos  253<H>  06-10    PT/INR - ( 2022 09:07 )   PT: 22.6 sec;   INR: 1.94 ratio         PTT - ( 2022 15:51 )  PTT:38.2 sec  Urinalysis Basic - ( 2022 17:19 )    Color: Brown / Appearance: Slightly Turbid / S.020 / pH: x  Gluc: x / Ketone: Negative  / Bili: Negative / Urobili: Negative mg/dL   Blood: x / Protein: 100 / Nitrite: Negative   Leuk Esterase: Moderate / RBC: >50 /HPF / WBC 6-10 /HPF   Sq Epi: x / Non Sq Epi: Few / Bacteria: Few      I&O's Summary      RADIOLOGY & ADDITIONAL STUDIES:    ADVANCE DIRECTIVES/TREATMENT PREFERENCES:  DNR YES NO  Completed on:                     MOLST  YES NO   Completed on:  Living Will  YES NO   Completed on: OVERNIGHT EVENTS:  F/U Note  NSVT overnight> AFIB with PVC's  asymptomatic  Patient more awake and alert this morning.  Renal and Liver studies improving  Spoke to daughter-will accept Hospice call for information and possibly more support at home  MOLST DNR/DNI at [facility]    Present Symptoms:     Dyspnea: Mild   Nausea/Vomiting:  No  Anxiety:  Yes   Depression:  No  Fatigue: Yes   Loss of appetite: Yes   Constipation:     Pain: denies            Character-            Duration-            Effect-            Factors-            Frequency-            Location-            Severity-      Review of Systems: Reviewed                       Unable to obtain due to poor mentation       MEDICATIONS  (STANDING):  aspirin enteric coated 81 milliGRAM(s) Oral daily  dextrose 5%. 1000 milliLiter(s) (100 mL/Hr) IV Continuous <Continuous>  dextrose 5%. 1000 milliLiter(s) (50 mL/Hr) IV Continuous <Continuous>  dextrose 50% Injectable 25 Gram(s) IV Push once  dextrose 50% Injectable 12.5 Gram(s) IV Push once  dextrose 50% Injectable 25 Gram(s) IV Push once  diltiazem    Tablet 60 milliGRAM(s) Oral every 8 hours  dorzolamide 2%/timolol 0.5% Ophthalmic Solution 1 Drop(s) Both EYES two times a day  glucagon  Injectable 1 milliGRAM(s) IntraMuscular once  insulin glargine Injectable (LANTUS) 5 Unit(s) SubCutaneous at bedtime  insulin lispro (ADMELOG) corrective regimen sliding scale   SubCutaneous three times a day before meals  metoprolol tartrate 100 milliGRAM(s) Oral two times a day  pantoprazole    Tablet 40 milliGRAM(s) Oral before breakfast  rivaroxaban 20 milliGRAM(s) Oral with dinner  sodium chloride 0.9%. 1000 milliLiter(s) (75 mL/Hr) IV Continuous <Continuous>  tamsulosin 0.4 milliGRAM(s) Oral at bedtime    MEDICATIONS  (PRN):  dextrose Oral Gel 15 Gram(s) Oral once PRN Blood Glucose LESS THAN 70 milliGRAM(s)/deciliter      PHYSICAL EXAM:    Vital Signs Last 24 Hrs  T(C): 37.1 (10 Berto 2022 07:20), Max: 37.1 (2022 19:15)  T(F): 98.7 (10 Berto 2022 07:20), Max: 98.8 (2022 19:15)  HR: 124 (10 Berto 2022 07:20) (83 - 124)  BP: 117/71 (10 Berto 2022 07:20) (101/58 - 141/81)  BP(mean): --  RR: 18 (10 Berto 2022 07:20) (18 - 18)  SpO2: 97% (10 Berto 2022 07:20) (93% - 100%)    General: alert  oriented x  1____awake, speech confused as per staff                 few words nonverbal      Karnofsky:  20%    HEENT: normal  dry mouth      Lungs: comfortable     CV:  tachycardia    GI: normal  distended                constipation  last BM: ?    : conley    MSK:  weakness                bedbound/wheelchair bound    Skin: __  no rash    LABS:                          9.3    6.55  )-----------( 207      ( 10 Berto 2022 03:09 )             28.6     06-10    137  |  104  |  24.8<H>  ----------------------------<  182<H>  4.5   |  22.0  |  1.58<H>    Ca    9.9      10 Berto 2022 03:09  Phos  1.9     06-10  Mg     1.9     06-10    TPro  6.3<L>  /  Alb  3.3  /  TBili  0.5  /  DBili  x   /  AST  111<H>  /  ALT  388<H>  /  AlkPhos  253<H>  06-10    PT/INR - ( 2022 09:07 )   PT: 22.6 sec;   INR: 1.94 ratio         PTT - ( 2022 15:51 )  PTT:38.2 sec  Urinalysis Basic - ( 2022 17:19 )    Color: Brown / Appearance: Slightly Turbid / S.020 / pH: x  Gluc: x / Ketone: Negative  / Bili: Negative / Urobili: Negative mg/dL   Blood: x / Protein: 100 / Nitrite: Negative   Leuk Esterase: Moderate / RBC: >50 /HPF / WBC 6-10 /HPF   Sq Epi: x / Non Sq Epi: Few / Bacteria: Few    I&O's Summary    RADIOLOGY & ADDITIONAL STUDIES:  < from: CT Head No Cont (22 @ 11:33) >    INTERPRETATION:  Clinical indication: Altered mental status    Multiple axial sections were performed from base skull to vertex without   contrast enhancement. Coronal and sagittal destructions were performed as   well    Parenchymal volume loss and chronic microvascular ischemic changes are   identified    Old lacunar infarct is also suspected involving the anterior right   coronal radiata and posteriorleft corona radiata region.    There is no acute hemorrhage mass or mass effect seen    Evaluation of osseous structures with the appropriate window appears   unremarkable    Minimal mucosal thickening is seen involving the right maxillary sinus.    Both mastoid and middle ear regions appear clear.    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    --- End of Report ---      ADVANCE DIRECTIVES/TREATMENT PREFERENCES:  DNR YES  Completed on:                     MOLST  YES   Completed on: Home with daughter  Living Will  No   Completed on:

## 2022-06-10 NOTE — PROGRESS NOTE ADULT - ASSESSMENT
Assessment/Plan  89 yoM readmitted within 24 hrs of discharge with AMS, Bradycardia,  Electrolyte imbalance, hypotension renal failure, hepatic failure.    [facility]    I called Lisset's Patient's daughter and caregiver this morning.  I listened to her report of her Dad's sudden decline at home, what she did, how she called ambulance...  She had already spoken to Dr. Kraft, and was given his clinical update.  I generalized same report, and asked if she would agree to be contacted by Hospice network to hear what their services can provide,  and possibly add on their services, which her Father being in multiorgan failure    She agreed to take their call and consider their services     Assessment/Plan  89 yoM readmitted within 24 hrs of discharge with AMS, Bradycardia,  Electrolyte imbalance, hypotension renal failure, hepatic failure.    Acute on chronic renal failure with hyperkalemia   Improving slowly   Status post 2 liter fluid bolus   IVF hydration  at 75 cc/hr  Hyperkalemia cocktail given in ED- K 4.5 today   BMP trending followed daily  - Baseline creatinine of 1.2  Cr 1.58 today     Acute metabolic encephalopathy  More alert today; confused to time and place   Baseline dementia  Patient refusing to take medications from staff, or eat his meals; does much better in care of his family at home     Acute hepatic failure  Secondary to hypovolemia  Status post fluid resuscitation  , , Alk Phos 253 elevated but improving     Urinary retention   UA (-)  He went home with Conley Catheter- Failed voiding trial  Conley catheter was changed in ED  Will be discharged home again with  conley in place  Flomax     Bradycardia  SVT during night  Now in AFIb with RVR  Recent diagnosed with AFIB  Cardizem PO  Resume Metoprolol PO   Telemetry monitoring     Dysphagia  Previous diet soft bite sized with mildly thick fluid     GOC  Called daughter Lisset this morning, discussed her Father's slow progress, and listened to her frustration, re having to have Dad readmitted < 24 hrs after discharge  To return home in care of daughter and his family  Would like to avoid readmissions, if he can be safe and managed at home  Considering adding on Hospice support-getting information from Hospice network today  Code Status; DNR/DNI- MOLST in place  Prognosis Poor-forsee many readmissions due to poor PO intake and multiorgan failure  Daughter needs alot of support  See GOC conversations from Hospice network and [facility]    I called Lisset's Patient's daughter and caregiver this morning.  I listened to her report of her Dad's sudden decline at home, what she did, how she called ambulance...  She had already spoken to Dr. Kraft, and had been given clinical update  I generalized same report, and asked if she would agree to be contacted by Hospice network to hear what their services can provide.  Possibly add on their services, which can be very helpful in caring for her Dad at home, and avoiding readmissions  She agreed to take their call and consider their services  DNR/DNI home with daughter

## 2022-06-10 NOTE — GOALS OF CARE CONVERSATION - ADVANCED CARE PLANNING - CONVERSATION DETAILS
Care manager note: Chart reviewed.  Case discussed with interdisciplinary team as patient re-adimitted within 24hrs.  Further goals of care held with medical team and family and family requesting further information about hospice services at this time.  This writer outreached daughter/emergency contact Lisset Benavides (h:[phone]; c: [phone]).  Demographics confirmed and still the same as most recent admission, including address on FS.  [facility].  Hospice philosophy as well as home hospice services discussed and all questions answered.  Dtr provided teach-back and noted to be in agreement with hospice referral at this time.  HC[facility] & [facility] identified as top choices and referral sent at this time for review.  Daughter expressed desire to get patient home ASAP and goal for patient not to have to return to hospital.  Discussed how hospice would help support that goal.  All questions answered.  Will continue to follow.

## 2022-06-10 NOTE — PROGRESS NOTE ADULT - NSPROGADDITIONALINFOA_GEN_ALL_CORE
Total Time Spent_20___ minutes  This includes chart review, patient assessment, discussion and collaboration with interdisciplinary team members, ACP planning    COUNSELING:  phone meeting to discuss Advanced Care Planning - Time Spent __20____Minutes.      Thank you for the opportunity to assist with the care of this patient.   [facility] Palliative Medicine Consult Service [phone].

## 2022-06-10 NOTE — CHART NOTE - NSCHARTNOTEFT_GEN_A_CORE
Called by RN 4 beats of NSVT.  Pt offered no complaints via staff member interpreting Tatyana OCHOA HPI:  The patient is a 89 year old male with a past medical history of hypertension, insulin dependent diabetes mellitus, AFib on xarelto, GIST  and BPH with an indwelling conley who was brought to the ER for altered mental status. Patient was discharged from [facility] yesterday after being admitted for new onset afib, acute toxic metabolic encephalopathy and VINCE with urinary retention. He was seen by palliative care, DNR/DNI however family refused Hospice on discharge. Was doing well this morning however progressively throughout the day family noted patient became more lethargic and then unresponsive. EMS found patient to be bradycardic in the 40s given atropine. In the Er, hypotensive bradycardic with worsening renal failure, hyperkalemia and acute hepatic failure. Was given 2 liters of fluid, hyperkalemia cocktail with insulin, albuterol, Kayexalate and calcium gluconate. Empiric IV Vancomycin and Zosyn given x 1. Chest xray was negative. Conley changed in the ED and UA sent.      PAST MEDICAL & SURGICAL HISTORY:  Diabetes  A-fib  Hypertension  Neuropathy  No significant past surgical history    Social History:  lives with family (08 Jun 2022 17:54)    FAMILY HISTORY:  No pertinent family history in first degree relatives    ROS: none except HPI    Allergies  No Known Allergies  No known Intolerances        Current medications:  aspirin enteric coated 81 milliGRAM(s) Oral daily  dextrose 5%. 1000 milliLiter(s) IV Continuous <Continuous>  dextrose 5%. 1000 milliLiter(s) IV Continuous <Continuous>  dextrose 50% Injectable 25 Gram(s) IV Push once  dextrose 50% Injectable 12.5 Gram(s) IV Push once  dextrose 50% Injectable 25 Gram(s) IV Push once  dextrose Oral Gel 15 Gram(s) Oral once PRN  diltiazem    Tablet 60 milliGRAM(s) Oral every 8 hours  dorzolamide 2%/timolol 0.5% Ophthalmic Solution 1 Drop(s) Both EYES two times a day  glucagon  Injectable 1 milliGRAM(s) IntraMuscular once  insulin glargine Injectable (LANTUS) 5 Unit(s) SubCutaneous at bedtime  insulin lispro (ADMELOG) corrective regimen sliding scale   SubCutaneous three times a day before meals  pantoprazole    Tablet 40 milliGRAM(s) Oral before breakfast  piperacillin/tazobactam IVPB.. 3.375 Gram(s) IV Intermittent every 8 hours  rivaroxaban 20 milliGRAM(s) Oral with dinner  sodium chloride 0.9%. 1000 milliLiter(s) IV Continuous <Continuous>  tamsulosin 0.4 milliGRAM(s) Oral at bedtime      On examination   Vital Signs Last 24 Hrs  T(C): 36.9 (10 Berto 2022 00:51), Max: 37.1 (09 Jun 2022 19:15)  T(F): 98.5 (10 Berto 2022 00:51), Max: 98.8 (09 Jun 2022 19:15)  HR: 100 (10 Berto 2022 00:51) (83 - 120)  BP: 129/74 (10 Berto 2022 00:51) (104/56 - 142/97)  BP(mean): --  RR: 18 (10 Berto 2022 00:51) (16 - 18)  SpO2: 94% (10 Berto 2022 00:51) (93% - 98%)    Heart- s1s2 heard, irregularly irregular  Lungs- clear bilaterally  Extremities no cyanosis, edema    EKG- atrial fibrillation with PVC's.  No ST/T wave abnormalities    Impression: 1- NSVT                    2- a.fib                    3- DM w/neuropathy   Plan: 1- EKG, as above          2- labs (CMP/Mg/Phos) pending          3- RN aware of findings and plan of care          4- observe, re-evaluate prn
Palliative care social work note.     Palliative care NP Delores Downing had lengthy discussion with daughter regarding medical issues and care at home. patient was readmitted after 24 hr discharge and support offered in coping with ongoing changes with patient. Hospice education and benefits reinforced. Daughter initially reluctant to accept Hospice plan of care for patient but was open to having further discussion with Hospice liaison to discuss services. Hospice referral initiated for further education.

## 2022-06-11 ENCOUNTER — TRANSCRIPTION ENCOUNTER (OUTPATIENT)
Age: 87
End: 2022-06-11

## 2022-06-11 VITALS
TEMPERATURE: 98 F | OXYGEN SATURATION: 97 % | SYSTOLIC BLOOD PRESSURE: 145 MMHG | HEART RATE: 87 BPM | RESPIRATION RATE: 18 BRPM | DIASTOLIC BLOOD PRESSURE: 78 MMHG

## 2022-06-11 LAB
ALBUMIN SERPL ELPH-MCNC: 3.5 G/DL — SIGNIFICANT CHANGE UP (ref 3.3–5.2)
ALP SERPL-CCNC: 222 U/L — HIGH (ref 40–120)
ALT FLD-CCNC: 244 U/L — HIGH
ANION GAP SERPL CALC-SCNC: 10 MMOL/L — SIGNIFICANT CHANGE UP (ref 5–17)
AST SERPL-CCNC: 46 U/L — HIGH
BILIRUB SERPL-MCNC: 0.6 MG/DL — SIGNIFICANT CHANGE UP (ref 0.4–2)
BUN SERPL-MCNC: 20.7 MG/DL — HIGH (ref 8–20)
CALCIUM SERPL-MCNC: 9.6 MG/DL — SIGNIFICANT CHANGE UP (ref 8.6–10.2)
CHLORIDE SERPL-SCNC: 103 MMOL/L — SIGNIFICANT CHANGE UP (ref 98–107)
CO2 SERPL-SCNC: 23 MMOL/L — SIGNIFICANT CHANGE UP (ref 22–29)
CREAT SERPL-MCNC: 1.25 MG/DL — SIGNIFICANT CHANGE UP (ref 0.5–1.3)
EGFR: 52 ML/MIN/1.73M2 — LOW
GLUCOSE BLDC GLUCOMTR-MCNC: 100 MG/DL — HIGH (ref 70–99)
GLUCOSE BLDC GLUCOMTR-MCNC: 77 MG/DL — SIGNIFICANT CHANGE UP (ref 70–99)
GLUCOSE SERPL-MCNC: 106 MG/DL — HIGH (ref 70–99)
HCT VFR BLD CALC: 30.2 % — LOW (ref 39–50)
HGB BLD-MCNC: 9.8 G/DL — LOW (ref 13–17)
MCHC RBC-ENTMCNC: 28.4 PG — SIGNIFICANT CHANGE UP (ref 27–34)
MCHC RBC-ENTMCNC: 32.5 GM/DL — SIGNIFICANT CHANGE UP (ref 32–36)
MCV RBC AUTO: 87.5 FL — SIGNIFICANT CHANGE UP (ref 80–100)
PLATELET # BLD AUTO: 223 K/UL — SIGNIFICANT CHANGE UP (ref 150–400)
POTASSIUM SERPL-MCNC: 5.3 MMOL/L — SIGNIFICANT CHANGE UP (ref 3.5–5.3)
POTASSIUM SERPL-SCNC: 5.3 MMOL/L — SIGNIFICANT CHANGE UP (ref 3.5–5.3)
PROT SERPL-MCNC: 7 G/DL — SIGNIFICANT CHANGE UP (ref 6.6–8.7)
RBC # BLD: 3.45 M/UL — LOW (ref 4.2–5.8)
RBC # FLD: 13.2 % — SIGNIFICANT CHANGE UP (ref 10.3–14.5)
SODIUM SERPL-SCNC: 136 MMOL/L — SIGNIFICANT CHANGE UP (ref 135–145)
WBC # BLD: 7.49 K/UL — SIGNIFICANT CHANGE UP (ref 3.8–10.5)
WBC # FLD AUTO: 7.49 K/UL — SIGNIFICANT CHANGE UP (ref 3.8–10.5)

## 2022-06-11 PROCEDURE — 99232 SBSQ HOSP IP/OBS MODERATE 35: CPT

## 2022-06-11 PROCEDURE — 82947 ASSAY GLUCOSE BLOOD QUANT: CPT

## 2022-06-11 PROCEDURE — 70450 CT HEAD/BRAIN W/O DYE: CPT

## 2022-06-11 PROCEDURE — 84100 ASSAY OF PHOSPHORUS: CPT

## 2022-06-11 PROCEDURE — 83605 ASSAY OF LACTIC ACID: CPT

## 2022-06-11 PROCEDURE — 87086 URINE CULTURE/COLONY COUNT: CPT

## 2022-06-11 PROCEDURE — 83735 ASSAY OF MAGNESIUM: CPT

## 2022-06-11 PROCEDURE — 93005 ELECTROCARDIOGRAM TRACING: CPT

## 2022-06-11 PROCEDURE — 85027 COMPLETE CBC AUTOMATED: CPT

## 2022-06-11 PROCEDURE — 81001 URINALYSIS AUTO W/SCOPE: CPT

## 2022-06-11 PROCEDURE — 84295 ASSAY OF SERUM SODIUM: CPT

## 2022-06-11 PROCEDURE — 99285 EMERGENCY DEPT VISIT HI MDM: CPT

## 2022-06-11 PROCEDURE — 85730 THROMBOPLASTIN TIME PARTIAL: CPT

## 2022-06-11 PROCEDURE — 96365 THER/PROPH/DIAG IV INF INIT: CPT

## 2022-06-11 PROCEDURE — 0225U NFCT DS DNA&RNA 21 SARSCOV2: CPT

## 2022-06-11 PROCEDURE — 85610 PROTHROMBIN TIME: CPT

## 2022-06-11 PROCEDURE — 85025 COMPLETE CBC W/AUTO DIFF WBC: CPT

## 2022-06-11 PROCEDURE — 82803 BLOOD GASES ANY COMBINATION: CPT

## 2022-06-11 PROCEDURE — 82962 GLUCOSE BLOOD TEST: CPT

## 2022-06-11 PROCEDURE — 96375 TX/PRO/DX INJ NEW DRUG ADDON: CPT

## 2022-06-11 PROCEDURE — 94640 AIRWAY INHALATION TREATMENT: CPT

## 2022-06-11 PROCEDURE — 71045 X-RAY EXAM CHEST 1 VIEW: CPT

## 2022-06-11 PROCEDURE — 82435 ASSAY OF BLOOD CHLORIDE: CPT

## 2022-06-11 PROCEDURE — 84132 ASSAY OF SERUM POTASSIUM: CPT

## 2022-06-11 PROCEDURE — 82330 ASSAY OF CALCIUM: CPT

## 2022-06-11 PROCEDURE — 87040 BLOOD CULTURE FOR BACTERIA: CPT

## 2022-06-11 PROCEDURE — 36415 COLL VENOUS BLD VENIPUNCTURE: CPT

## 2022-06-11 PROCEDURE — 80053 COMPREHEN METABOLIC PANEL: CPT

## 2022-06-11 PROCEDURE — 85018 HEMOGLOBIN: CPT

## 2022-06-11 PROCEDURE — 85014 HEMATOCRIT: CPT

## 2022-06-11 RX ADMIN — Medication 60 MILLIGRAM(S): at 14:27

## 2022-06-11 RX ADMIN — PANTOPRAZOLE SODIUM 40 MILLIGRAM(S): 20 TABLET, DELAYED RELEASE ORAL at 06:22

## 2022-06-11 RX ADMIN — DORZOLAMIDE HYDROCHLORIDE TIMOLOL MALEATE 1 DROP(S): 20; 5 SOLUTION/ DROPS OPHTHALMIC at 06:25

## 2022-06-11 RX ADMIN — Medication 60 MILLIGRAM(S): at 06:22

## 2022-06-11 NOTE — DISCHARGE NOTE PROVIDER - NSDCMRMEDTOKEN_GEN_ALL_CORE_FT
aspirin 81 mg oral tablet: 1 tab(s) orally once a day  calcium acetate 667 mg oral tablet: 1 tab(s) orally 4 times a day with meal  dilTIAZem 60 mg oral tablet: 1 tab(s) orally every 8 hours  dorzolamide-timolol 2%-0.5% preservative-free ophthalmic solution: 1 drop(s) to each affected eye 2 times a day  gabapentin 100 mg oral capsule: 1 cap(s) orally 2 times a day  insulin glargine 100 units/mL subcutaneous solution: 8 unit(s) subcutaneous once a day (at bedtime)  lidocaine 4% topical film: Apply topically to affected area once a day  metoprolol tartrate 100 mg oral tablet: 1 tab(s) orally every 12 hours  pantoprazole 20 mg oral delayed release tablet: 1 tab(s) orally once a day  rivaroxaban 20 mg oral tablet: 1 tab(s) orally once a day  tamsulosin 0.4 mg oral capsule: 1 cap(s) orally once a day (at bedtime)   aspirin 81 mg oral tablet: 1 tab(s) orally once a day  calcium acetate 667 mg oral tablet: 3 tab(s) orally 3 times a day (with meals)  dilTIAZem 60 mg oral tablet: 1 tab(s) orally every 8 hours  dorzolamide-timolol 2%-0.5% preservative-free ophthalmic solution: 1 drop(s) to each affected eye 2 times a day  insulin glargine 100 units/mL subcutaneous solution: 8 unit(s) subcutaneous once a day (at bedtime)  lidocaine 4% topical film: Apply topically to affected area once a day  metoprolol tartrate 100 mg oral tablet: 1 tab(s) orally every 12 hours  pantoprazole 20 mg oral delayed release tablet: 1 tab(s) orally once a day  rivaroxaban 20 mg oral tablet: 1 tab(s) orally once a day  tamsulosin 0.4 mg oral capsule: 1 cap(s) orally once a day (at bedtime)

## 2022-06-11 NOTE — DISCHARGE NOTE NURSING/CASE MANAGEMENT/SOCIAL WORK - NSDCPEFALRISK_GEN_ALL_CORE
For information on Fall & Injury Prevention, visit: [email or web address] OR  [email or web address] OR  https://www.cdc.gov/steadi/patient.html

## 2022-06-11 NOTE — DISCHARGE NOTE NURSING/CASE MANAGEMENT/SOCIAL WORK - PATIENT PORTAL LINK FT
You can access the FollowMyHealth Patient Portal offered by [facility] by registering at the following website: [email or web address]. By joining [employer]’s FollowMyHealth portal, you will also be able to view your health information using other applications (apps) compatible with our system.

## 2022-06-11 NOTE — DISCHARGE NOTE PROVIDER - NSDCCPCAREPLAN_GEN_ALL_CORE_FT
PRINCIPAL DISCHARGE DIAGNOSIS  Diagnosis: Acute metabolic encephalopathy  Assessment and Plan of Treatment:       SECONDARY DISCHARGE DIAGNOSES  Diagnosis: VINCE (acute kidney injury)  Assessment and Plan of Treatment:     Diagnosis: Hyperkalemia  Assessment and Plan of Treatment:     Diagnosis: Permanent atrial fibrillation  Assessment and Plan of Treatment:     Diagnosis: Dysphagia  Assessment and Plan of Treatment:      PRINCIPAL DISCHARGE DIAGNOSIS  Diagnosis: Acute metabolic encephalopathy  Assessment and Plan of Treatment: in setting of baseline dementia      SECONDARY DISCHARGE DIAGNOSES  Diagnosis: VINCE (acute kidney injury)  Assessment and Plan of Treatment: improving s/p 2L IVF    Diagnosis: Hyperkalemia  Assessment and Plan of Treatment: improved    Diagnosis: Permanent atrial fibrillation  Assessment and Plan of Treatment: home meds resumed, HR improved    Diagnosis: Dysphagia  Assessment and Plan of Treatment: continue modified diet

## 2022-06-11 NOTE — DISCHARGE NOTE PROVIDER - HOSPITAL COURSE
The patient is a 89 year old male with a past medical history of hypertension, insulin dependent diabetes mellitus, AFib on xarelto, GIST  and BPH with an indwelling conley who was   brought to the ER for altered mental status. When picked up by EMS found to be bradycardic in the 40s with hypotension. GIven a fluid bolus and atropine x 1 via an IO.   In the ER noted to have worsening renal failure, hyperkalemia and transaminitis. Given 2 liters of IV fluid and hyperkalemia cocktail with improvement. Conley was changed in the Er.   Clinically improved after IV hydration at baseline of AOX1. admitted for acute metabolic encephalopathy. CT head was negative. Initially started on empiric IV antibiotics. All   cultures were negative; Sepsis/SIRS was ruled out. IV antibiotics discontinued. Cardizem and metoprolol were held on admission for bradycardia. Bradycardia resolved and developed afib with   RVR. Cardizem and metoprolol were resumed. Dysphagia diet was resumed; tolerated well. Palliative care was consulted to address GOC. Hospice referral was sent to discuss  hospice services with patient's daughter.      The patient is a 89 year old male with a past medical history of hypertension, insulin dependent diabetes mellitus, AFib on xarelto, GIST  and BPH with an indwelling conley who was   brought to the ER for altered mental status. When picked up by EMS found to be bradycardic in the 40s with hypotension. GIven a fluid bolus and atropine x 1 via an IO.   In the ER noted to have worsening renal failure, hyperkalemia and transaminitis. Given 2 liters of IV fluid and hyperkalemia cocktail with improvement. Conley was changed in the Er.   Clinically improved after IV hydration at baseline of AOX1. admitted for acute metabolic encephalopathy. CT head was negative. Initially started on empiric IV antibiotics. All   cultures were negative; Sepsis/SIRS was ruled out. IV antibiotics discontinued. Cardizem and metoprolol were held on admission for bradycardia. Bradycardia resolved and developed afib with   RVR. Cardizem and metoprolol were resumed. Dysphagia diet was resumed; tolerated well. Palliative care was consulted to address GOC. Hospice referral was sent to discuss  hospice services with patient's daughter, daughter in agreement with hospice referral. Pt to dc home w/ home hospice

## 2022-06-13 LAB
CULTURE RESULTS: SIGNIFICANT CHANGE UP
CULTURE RESULTS: SIGNIFICANT CHANGE UP
SPECIMEN SOURCE: SIGNIFICANT CHANGE UP
SPECIMEN SOURCE: SIGNIFICANT CHANGE UP

## 2022-06-26 ENCOUNTER — INPATIENT (INPATIENT)
Facility: HOSPITAL | Age: 87
LOS: 3 days | Discharge: HOME | DRG: 871 | End: 2022-06-30
Attending: STUDENT IN AN ORGANIZED HEALTH CARE EDUCATION/TRAINING PROGRAM | Admitting: INTERNAL MEDICINE
Payer: OTHER MISCELLANEOUS

## 2022-06-26 VITALS
OXYGEN SATURATION: 100 % | TEMPERATURE: 98 F | HEART RATE: 117 BPM | HEIGHT: 68 IN | RESPIRATION RATE: 22 BRPM | DIASTOLIC BLOOD PRESSURE: 56 MMHG | SYSTOLIC BLOOD PRESSURE: 87 MMHG

## 2022-06-26 DIAGNOSIS — D72.829 ELEVATED WHITE BLOOD CELL COUNT, UNSPECIFIED: ICD-10-CM

## 2022-06-26 DIAGNOSIS — I48.91 UNSPECIFIED ATRIAL FIBRILLATION: ICD-10-CM

## 2022-06-26 DIAGNOSIS — R33.9 RETENTION OF URINE, UNSPECIFIED: ICD-10-CM

## 2022-06-26 DIAGNOSIS — G93.41 METABOLIC ENCEPHALOPATHY: ICD-10-CM

## 2022-06-26 DIAGNOSIS — E11.9 TYPE 2 DIABETES MELLITUS WITHOUT COMPLICATIONS: ICD-10-CM

## 2022-06-26 DIAGNOSIS — Z02.9 ENCOUNTER FOR ADMINISTRATIVE EXAMINATIONS, UNSPECIFIED: ICD-10-CM

## 2022-06-26 DIAGNOSIS — N17.9 ACUTE KIDNEY FAILURE, UNSPECIFIED: ICD-10-CM

## 2022-06-26 LAB
ALBUMIN SERPL ELPH-MCNC: 3.3 G/DL — SIGNIFICANT CHANGE UP (ref 3.3–5.2)
ALP SERPL-CCNC: 148 U/L — HIGH (ref 40–120)
ALT FLD-CCNC: 71 U/L — HIGH
ANION GAP SERPL CALC-SCNC: 15 MMOL/L — SIGNIFICANT CHANGE UP (ref 5–17)
ANION GAP SERPL CALC-SCNC: 16 MMOL/L — SIGNIFICANT CHANGE UP (ref 5–17)
APPEARANCE UR: ABNORMAL
APTT BLD: 31.3 SEC — SIGNIFICANT CHANGE UP (ref 27.5–35.5)
AST SERPL-CCNC: 58 U/L — HIGH
BACTERIA # UR AUTO: ABNORMAL
BASOPHILS # BLD AUTO: 0 K/UL — SIGNIFICANT CHANGE UP (ref 0–0.2)
BASOPHILS NFR BLD AUTO: 0 % — SIGNIFICANT CHANGE UP (ref 0–2)
BILIRUB SERPL-MCNC: 0.8 MG/DL — SIGNIFICANT CHANGE UP (ref 0.4–2)
BILIRUB UR-MCNC: NEGATIVE — SIGNIFICANT CHANGE UP
BUN SERPL-MCNC: 47.6 MG/DL — HIGH (ref 8–20)
BUN SERPL-MCNC: 47.7 MG/DL — HIGH (ref 8–20)
BURR CELLS BLD QL SMEAR: PRESENT — SIGNIFICANT CHANGE UP
CALCIUM SERPL-MCNC: 10.1 MG/DL — SIGNIFICANT CHANGE UP (ref 8.6–10.2)
CALCIUM SERPL-MCNC: 10.4 MG/DL — HIGH (ref 8.6–10.2)
CHLORIDE SERPL-SCNC: 100 MMOL/L — SIGNIFICANT CHANGE UP (ref 98–107)
CHLORIDE SERPL-SCNC: 101 MMOL/L — SIGNIFICANT CHANGE UP (ref 98–107)
CK MB CFR SERPL CALC: 5 NG/ML — SIGNIFICANT CHANGE UP (ref 0–6.7)
CK SERPL-CCNC: 912 U/L — HIGH (ref 30–200)
CO2 SERPL-SCNC: 17 MMOL/L — LOW (ref 22–29)
CO2 SERPL-SCNC: 20 MMOL/L — LOW (ref 22–29)
COLOR SPEC: YELLOW — SIGNIFICANT CHANGE UP
COMMENT - URINE: SIGNIFICANT CHANGE UP
CREAT SERPL-MCNC: 2.85 MG/DL — HIGH (ref 0.5–1.3)
CREAT SERPL-MCNC: 3.4 MG/DL — HIGH (ref 0.5–1.3)
DIFF PNL FLD: ABNORMAL
EGFR: 16 ML/MIN/1.73M2 — LOW
EGFR: 19 ML/MIN/1.73M2 — LOW
EOSINOPHIL # BLD AUTO: 0.29 K/UL — SIGNIFICANT CHANGE UP (ref 0–0.5)
EOSINOPHIL NFR BLD AUTO: 1 % — SIGNIFICANT CHANGE UP (ref 0–6)
EPI CELLS # UR: ABNORMAL
GLUCOSE BLDC GLUCOMTR-MCNC: 144 MG/DL — HIGH (ref 70–99)
GLUCOSE BLDC GLUCOMTR-MCNC: 146 MG/DL — HIGH (ref 70–99)
GLUCOSE BLDC GLUCOMTR-MCNC: 155 MG/DL — HIGH (ref 70–99)
GLUCOSE BLDC GLUCOMTR-MCNC: 158 MG/DL — HIGH (ref 70–99)
GLUCOSE SERPL-MCNC: 129 MG/DL — HIGH (ref 70–99)
GLUCOSE SERPL-MCNC: 142 MG/DL — HIGH (ref 70–99)
GLUCOSE UR QL: NEGATIVE MG/DL — SIGNIFICANT CHANGE UP
HCT VFR BLD CALC: 32.3 % — LOW (ref 39–50)
HGB BLD-MCNC: 10.1 G/DL — LOW (ref 13–17)
INR BLD: 2.05 RATIO — HIGH (ref 0.88–1.16)
KETONES UR-MCNC: NEGATIVE — SIGNIFICANT CHANGE UP
LEUKOCYTE ESTERASE UR-ACNC: ABNORMAL
LYMPHOCYTES # BLD AUTO: 1.17 K/UL — SIGNIFICANT CHANGE UP (ref 1–3.3)
LYMPHOCYTES # BLD AUTO: 4 % — LOW (ref 13–44)
MANUAL SMEAR VERIFICATION: SIGNIFICANT CHANGE UP
MCHC RBC-ENTMCNC: 28.5 PG — SIGNIFICANT CHANGE UP (ref 27–34)
MCHC RBC-ENTMCNC: 31.3 GM/DL — LOW (ref 32–36)
MCV RBC AUTO: 91.2 FL — SIGNIFICANT CHANGE UP (ref 80–100)
METAMYELOCYTES # FLD: 5 % — HIGH (ref 0–0)
MONOCYTES # BLD AUTO: 1.17 K/UL — HIGH (ref 0–0.9)
MONOCYTES NFR BLD AUTO: 4 % — SIGNIFICANT CHANGE UP (ref 2–14)
MYELOCYTES NFR BLD: 2 % — HIGH (ref 0–0)
NEUTROPHILS # BLD AUTO: 24.54 K/UL — HIGH (ref 1.8–7.4)
NEUTROPHILS NFR BLD AUTO: 72 % — SIGNIFICANT CHANGE UP (ref 43–77)
NEUTS BAND # BLD: 12 % — HIGH (ref 0–8)
NITRITE UR-MCNC: NEGATIVE — SIGNIFICANT CHANGE UP
NRBC # BLD: 0 /100 — SIGNIFICANT CHANGE UP (ref 0–0)
PH UR: 6 — SIGNIFICANT CHANGE UP (ref 5–8)
PHOSPHATE SERPL-MCNC: 3.2 MG/DL — SIGNIFICANT CHANGE UP (ref 2.4–4.7)
PLAT MORPH BLD: NORMAL — SIGNIFICANT CHANGE UP
PLATELET # BLD AUTO: 155 K/UL — SIGNIFICANT CHANGE UP (ref 150–400)
POIKILOCYTOSIS BLD QL AUTO: SIGNIFICANT CHANGE UP
POLYCHROMASIA BLD QL SMEAR: SLIGHT — SIGNIFICANT CHANGE UP
POTASSIUM SERPL-MCNC: 5.3 MMOL/L — SIGNIFICANT CHANGE UP (ref 3.5–5.3)
POTASSIUM SERPL-MCNC: 6.2 MMOL/L — CRITICAL HIGH (ref 3.5–5.3)
POTASSIUM SERPL-SCNC: 5.3 MMOL/L — SIGNIFICANT CHANGE UP (ref 3.5–5.3)
POTASSIUM SERPL-SCNC: 6.2 MMOL/L — CRITICAL HIGH (ref 3.5–5.3)
PROT SERPL-MCNC: 6.6 G/DL — SIGNIFICANT CHANGE UP (ref 6.6–8.7)
PROT UR-MCNC: NEGATIVE — SIGNIFICANT CHANGE UP
PROTHROM AB SERPL-ACNC: 24 SEC — HIGH (ref 10.5–13.4)
RBC # BLD: 3.54 M/UL — LOW (ref 4.2–5.8)
RBC # FLD: 14.7 % — HIGH (ref 10.3–14.5)
RBC BLD AUTO: ABNORMAL
RBC CASTS # UR COMP ASSIST: >50 /HPF (ref 0–4)
SARS-COV-2 RNA SPEC QL NAA+PROBE: SIGNIFICANT CHANGE UP
SODIUM SERPL-SCNC: 134 MMOL/L — LOW (ref 135–145)
SODIUM SERPL-SCNC: 135 MMOL/L — SIGNIFICANT CHANGE UP (ref 135–145)
SP GR SPEC: 1.01 — SIGNIFICANT CHANGE UP (ref 1.01–1.02)
UROBILINOGEN FLD QL: NEGATIVE MG/DL — SIGNIFICANT CHANGE UP
WBC # BLD: 29.21 K/UL — HIGH (ref 3.8–10.5)
WBC # FLD AUTO: 29.21 K/UL — HIGH (ref 3.8–10.5)
WBC UR QL: >50 /HPF (ref 0–5)

## 2022-06-26 PROCEDURE — 99223 1ST HOSP IP/OBS HIGH 75: CPT

## 2022-06-26 PROCEDURE — 99497 ADVNCD CARE PLAN 30 MIN: CPT | Mod: 25

## 2022-06-26 PROCEDURE — 93010 ELECTROCARDIOGRAM REPORT: CPT

## 2022-06-26 PROCEDURE — 71045 X-RAY EXAM CHEST 1 VIEW: CPT | Mod: 26

## 2022-06-26 PROCEDURE — 99285 EMERGENCY DEPT VISIT HI MDM: CPT

## 2022-06-26 RX ORDER — PIPERACILLIN AND TAZOBACTAM 4; .5 G/20ML; G/20ML
3.38 INJECTION, POWDER, LYOPHILIZED, FOR SOLUTION INTRAVENOUS ONCE
Refills: 0 | Status: COMPLETED | OUTPATIENT
Start: 2022-06-26 | End: 2022-06-26

## 2022-06-26 RX ORDER — METOPROLOL TARTRATE 50 MG
50 TABLET ORAL ONCE
Refills: 0 | Status: DISCONTINUED | OUTPATIENT
Start: 2022-06-26 | End: 2022-06-26

## 2022-06-26 RX ORDER — DEXTROSE 50 % IN WATER 50 %
50 SYRINGE (ML) INTRAVENOUS ONCE
Refills: 0 | Status: COMPLETED | OUTPATIENT
Start: 2022-06-26 | End: 2022-06-26

## 2022-06-26 RX ORDER — INSULIN LISPRO 100/ML
VIAL (ML) SUBCUTANEOUS EVERY 6 HOURS
Refills: 0 | Status: DISCONTINUED | OUTPATIENT
Start: 2022-06-26 | End: 2022-06-30

## 2022-06-26 RX ORDER — ONDANSETRON 8 MG/1
4 TABLET, FILM COATED ORAL EVERY 8 HOURS
Refills: 0 | Status: DISCONTINUED | OUTPATIENT
Start: 2022-06-26 | End: 2022-06-30

## 2022-06-26 RX ORDER — METOPROLOL TARTRATE 50 MG
5 TABLET ORAL EVERY 6 HOURS
Refills: 0 | Status: DISCONTINUED | OUTPATIENT
Start: 2022-06-26 | End: 2022-06-29

## 2022-06-26 RX ORDER — INSULIN HUMAN 100 [IU]/ML
10 INJECTION, SOLUTION SUBCUTANEOUS ONCE
Refills: 0 | Status: COMPLETED | OUTPATIENT
Start: 2022-06-26 | End: 2022-06-26

## 2022-06-26 RX ORDER — SODIUM CHLORIDE 9 MG/ML
1000 INJECTION, SOLUTION INTRAVENOUS
Refills: 0 | Status: DISCONTINUED | OUTPATIENT
Start: 2022-06-26 | End: 2022-06-26

## 2022-06-26 RX ORDER — DEXTROSE 50 % IN WATER 50 %
25 SYRINGE (ML) INTRAVENOUS ONCE
Refills: 0 | Status: DISCONTINUED | OUTPATIENT
Start: 2022-06-26 | End: 2022-06-30

## 2022-06-26 RX ORDER — LANOLIN ALCOHOL/MO/W.PET/CERES
3 CREAM (GRAM) TOPICAL AT BEDTIME
Refills: 0 | Status: DISCONTINUED | OUTPATIENT
Start: 2022-06-26 | End: 2022-06-26

## 2022-06-26 RX ORDER — DEXTROSE 50 % IN WATER 50 %
15 SYRINGE (ML) INTRAVENOUS ONCE
Refills: 0 | Status: DISCONTINUED | OUTPATIENT
Start: 2022-06-26 | End: 2022-06-30

## 2022-06-26 RX ORDER — SODIUM CHLORIDE 9 MG/ML
1000 INJECTION, SOLUTION INTRAVENOUS
Refills: 0 | Status: DISCONTINUED | OUTPATIENT
Start: 2022-06-26 | End: 2022-06-30

## 2022-06-26 RX ORDER — METOPROLOL TARTRATE 50 MG
2.5 TABLET ORAL ONCE
Refills: 0 | Status: COMPLETED | OUTPATIENT
Start: 2022-06-26 | End: 2022-06-26

## 2022-06-26 RX ORDER — SODIUM ZIRCONIUM CYCLOSILICATE 10 G/10G
10 POWDER, FOR SUSPENSION ORAL ONCE
Refills: 0 | Status: COMPLETED | OUTPATIENT
Start: 2022-06-26 | End: 2022-06-26

## 2022-06-26 RX ORDER — ALBUTEROL 90 UG/1
10 AEROSOL, METERED ORAL ONCE
Refills: 0 | Status: COMPLETED | OUTPATIENT
Start: 2022-06-26 | End: 2022-06-26

## 2022-06-26 RX ORDER — PIPERACILLIN AND TAZOBACTAM 4; .5 G/20ML; G/20ML
3.38 INJECTION, POWDER, LYOPHILIZED, FOR SOLUTION INTRAVENOUS EVERY 12 HOURS
Refills: 0 | Status: DISCONTINUED | OUTPATIENT
Start: 2022-06-26 | End: 2022-06-27

## 2022-06-26 RX ORDER — SODIUM CHLORIDE 9 MG/ML
2000 INJECTION, SOLUTION INTRAVENOUS ONCE
Refills: 0 | Status: COMPLETED | OUTPATIENT
Start: 2022-06-26 | End: 2022-06-26

## 2022-06-26 RX ORDER — DEXTROSE 50 % IN WATER 50 %
12.5 SYRINGE (ML) INTRAVENOUS ONCE
Refills: 0 | Status: DISCONTINUED | OUTPATIENT
Start: 2022-06-26 | End: 2022-06-30

## 2022-06-26 RX ORDER — GLUCAGON INJECTION, SOLUTION 0.5 MG/.1ML
1 INJECTION, SOLUTION SUBCUTANEOUS ONCE
Refills: 0 | Status: DISCONTINUED | OUTPATIENT
Start: 2022-06-26 | End: 2022-06-30

## 2022-06-26 RX ORDER — CALCIUM GLUCONATE 100 MG/ML
1 VIAL (ML) INTRAVENOUS ONCE
Refills: 0 | Status: COMPLETED | OUTPATIENT
Start: 2022-06-26 | End: 2022-06-26

## 2022-06-26 RX ORDER — ROBINUL 0.2 MG/ML
0.4 INJECTION INTRAMUSCULAR; INTRAVENOUS
Refills: 0 | Status: DISCONTINUED | OUTPATIENT
Start: 2022-06-26 | End: 2022-06-30

## 2022-06-26 RX ORDER — FUROSEMIDE 40 MG
40 TABLET ORAL ONCE
Refills: 0 | Status: COMPLETED | OUTPATIENT
Start: 2022-06-26 | End: 2022-06-26

## 2022-06-26 RX ORDER — CALCIUM ACETATE 667 MG
3 TABLET ORAL
Qty: 0 | Refills: 0 | DISCHARGE

## 2022-06-26 RX ORDER — SODIUM CHLORIDE 9 MG/ML
1000 INJECTION, SOLUTION INTRAVENOUS
Refills: 0 | Status: DISCONTINUED | OUTPATIENT
Start: 2022-06-26 | End: 2022-06-27

## 2022-06-26 RX ORDER — ACETAMINOPHEN 500 MG
650 TABLET ORAL EVERY 6 HOURS
Refills: 0 | Status: DISCONTINUED | OUTPATIENT
Start: 2022-06-26 | End: 2022-06-26

## 2022-06-26 RX ORDER — METOPROLOL TARTRATE 50 MG
2.5 TABLET ORAL EVERY 6 HOURS
Refills: 0 | Status: DISCONTINUED | OUTPATIENT
Start: 2022-06-26 | End: 2022-06-26

## 2022-06-26 RX ORDER — DORZOLAMIDE HYDROCHLORIDE TIMOLOL MALEATE 20; 5 MG/ML; MG/ML
1 SOLUTION/ DROPS OPHTHALMIC
Qty: 0 | Refills: 0 | DISCHARGE

## 2022-06-26 RX ADMIN — ROBINUL 0.4 MILLIGRAM(S): 0.2 INJECTION INTRAMUSCULAR; INTRAVENOUS at 07:15

## 2022-06-26 RX ADMIN — PIPERACILLIN AND TAZOBACTAM 200 GRAM(S): 4; .5 INJECTION, POWDER, LYOPHILIZED, FOR SOLUTION INTRAVENOUS at 05:16

## 2022-06-26 RX ADMIN — Medication 50 MILLILITER(S): at 08:27

## 2022-06-26 RX ADMIN — INSULIN HUMAN 10 UNIT(S): 100 INJECTION, SOLUTION SUBCUTANEOUS at 08:27

## 2022-06-26 RX ADMIN — Medication 100 GRAM(S): at 08:26

## 2022-06-26 RX ADMIN — Medication 2.5 MILLIGRAM(S): at 08:50

## 2022-06-26 RX ADMIN — SODIUM CHLORIDE 2000 MILLILITER(S): 9 INJECTION, SOLUTION INTRAVENOUS at 05:16

## 2022-06-26 RX ADMIN — Medication 5 MILLIGRAM(S): at 12:07

## 2022-06-26 RX ADMIN — Medication 40 MILLIGRAM(S): at 11:34

## 2022-06-26 RX ADMIN — SODIUM CHLORIDE 70 MILLILITER(S): 9 INJECTION, SOLUTION INTRAVENOUS at 20:21

## 2022-06-26 RX ADMIN — SODIUM CHLORIDE 70 MILLILITER(S): 9 INJECTION, SOLUTION INTRAVENOUS at 21:26

## 2022-06-26 RX ADMIN — PIPERACILLIN AND TAZOBACTAM 25 GRAM(S): 4; .5 INJECTION, POWDER, LYOPHILIZED, FOR SOLUTION INTRAVENOUS at 18:30

## 2022-06-26 RX ADMIN — SODIUM CHLORIDE 70 MILLILITER(S): 9 INJECTION, SOLUTION INTRAVENOUS at 11:34

## 2022-06-26 RX ADMIN — Medication 5 MILLIGRAM(S): at 21:26

## 2022-06-26 RX ADMIN — ALBUTEROL 10 MILLIGRAM(S): 90 AEROSOL, METERED ORAL at 08:30

## 2022-06-26 NOTE — ED ADULT TRIAGE NOTE - CHIEF COMPLAINT QUOTE
BIBEMS from home for changes to breathing. Pt is a home hospice patient and presents to ED with AYALA noting DNR/DNI. [facility] in place on arrival with blood noted at the meatus. Pt is somulent, does not respond to pain or voice which has been his baseline for several weeks according to EMS. Daughter states that he has been like this progressively, less responsive since Saturday. Daughter states "I just can't accept to see him like this."

## 2022-06-26 NOTE — H&P ADULT - ASSESSMENT
Patient is a 89M w/ hx of DM, HTN, A. fib on Xarelto who presents from home with lethargy found to be in A. fib w/ RVR likely 2/2 infection. Now admitted for further workup and management.  Patient is a 89M w/ hx of DM, HTN, A. fib on Xarelto who presents from home with lethargy found to be in A. fib w/ RVR likely 2/2 infection. Now admitted for further workup and management. DNR/DNI.

## 2022-06-26 NOTE — ED PROVIDER NOTE - OBJECTIVE STATEMENT
89 year old male PMh DM, htn, afib presents with AMS. The pt was discharged from this facility on 6/11 to home hospice. While there the pt was doing well, had visiting nurse coming, and then had a conley replaced 2 days ago. Every since the conley was changed the daughter states that he became more lethargic and less responsive. Today he was labored in his breathing and unresponsive and she "did not want him to die at home".

## 2022-06-26 NOTE — ED PROVIDER NOTE - CLINICAL SUMMARY MEDICAL DECISION MAKING FREE TEXT BOX
Pt with AMS likely due to renal failure. Suspect that the pt is proceeding through the phases of death, but family declines comfort care measures.

## 2022-06-26 NOTE — CONSULT NOTE ADULT - SUBJECTIVE AND OBJECTIVE BOX
HPI:  Patient is a 89M w/ hx of DM, HTN, A. fib on Xarelto who presents from home with lethargy. Of note, patient was recently hospitalized for AMS and his hospital c/b renal failure. Patient is lethargic. History obtained from patient's daughter, Lisset. Per daughter, she had noticed hematuria yesterday. In addition, he was confused and had low FS due to decreased PO intake. No f/c/h/d/n/v. Patient was subsequently discharged home w/ home hospice.     In the ED, his vital signs were notable for tachy 117, tachypnea and BP- 80s/50s. Labs notable for leukocytosis, hyperK- 6.2, ARF, and transaminitis.     PAST MEDICAL & SURGICAL HISTORY:  Diabetes      A-fib      Hypertension      Neuropathy      No significant past surgical history       DM 2, MO, hypothyroidism, prior DVT and IVC filter; Cholecystectomy    FAMILY HISTORY:  No pertinent family history in first degree relatives    [place]    Social History:Non smoker    MEDICATIONS  (STANDING):  dextrose 5% + sodium chloride 0.9%. 1000 milliLiter(s) (75 mL/Hr) IV Continuous <Continuous>  dextrose 5%. 1000 milliLiter(s) (50 mL/Hr) IV Continuous <Continuous>  dextrose 5%. 1000 milliLiter(s) (100 mL/Hr) IV Continuous <Continuous>  dextrose 50% Injectable 25 Gram(s) IV Push once  dextrose 50% Injectable 12.5 Gram(s) IV Push once  dextrose 50% Injectable 25 Gram(s) IV Push once  glucagon  Injectable 1 milliGRAM(s) IntraMuscular once  insulin lispro (ADMELOG) corrective regimen sliding scale   SubCutaneous every 6 hours  piperacillin/tazobactam IVPB.. 3.375 Gram(s) IV Intermittent every 12 hours    MEDICATIONS  (PRN):  dextrose Oral Gel 15 Gram(s) Oral once PRN Blood Glucose LESS THAN 70 milliGRAM(s)/deciliter  glycopyrrolate Injectable 0.4 milliGRAM(s) IV Push four times a day PRN Secretions  metoprolol tartrate Injectable 2.5 milliGRAM(s) IV Push every 6 hours PRN HR> 120  ondansetron Injectable 4 milliGRAM(s) IV Push every 8 hours PRN Nausea and/or Vomiting   Meds reviewed    Vital Signs Last 24 Hrs  T(C): 36.9 (26 Jun 2022 07:56), Max: 36.9 (26 Jun 2022 07:56)  T(F): 98.4 (26 Jun 2022 07:56), Max: 98.4 (26 Jun 2022 07:56)  HR: 106 (26 Jun 2022 09:10) (106 - 136)  BP: 103/62 (26 Jun 2022 09:10) (87/56 - 112/53)  BP(mean): --  RR: 23 (26 Jun 2022 09:10) (22 - 24)  SpO2: 100% (26 Jun 2022 09:10) (96% - 100%)  Daily Height in cm: 172.72 (26 Jun 2022 03:40)    Daily     PHYSICAL EXAM:    GENERAL: appears acutely and chronically ill  HEAD: NCAT  EYES: EOMI  NECK: Supple  NERVOUS SYSTEM:  Arousable  CHEST/LUNG: Clear to percussion bilaterally  HEART: Regular rate and rhythm  ABDOMEN: Soft, Nontender, Nondistended; +BS  EXTREMITIES:  edema      LABS:                        10.1   29.21 )-----------( 155      ( 26 Jun 2022 05:13 )             32.3     06-26    134<L>  |  101  |  47.7<H>  ----------------------------<  129<H>  6.2<HH>   |  17.0<L>  |  3.40<H>    Ca    10.1      26 Jun 2022 05:43    TPro  6.6  /  Alb  3.3  /  TBili  0.8  /  DBili  x   /  AST  58<H>  /  ALT  71<H>  /  AlkPhos  148<H>  06-26    PT/INR - ( 26 Jun 2022 09:21 )   PT: 24.0 sec;   INR: 2.05 ratio         PTT - ( 26 Jun 2022 09:21 )  PTT:31.3 sec            RADIOLOGY & ADDITIONAL TESTS:

## 2022-06-26 NOTE — H&P ADULT - NSHPLABSRESULTS_GEN_ALL_CORE
Personally reviewed available labs, imaging and ekg     Labs  CBC Full  -  ( 26 Jun 2022 05:13 )  WBC Count : 29.21 K/uL  RBC Count : 3.54 M/uL  Hemoglobin : 10.1 g/dL  Hematocrit : 32.3 %  Platelet Count - Automated : 155 K/uL  Mean Cell Volume : 91.2 fl  Mean Cell Hemoglobin : 28.5 pg  Mean Cell Hemoglobin Concentration : 31.3 gm/dL  Auto Neutrophil # : 24.54 K/uL  Auto Lymphocyte # : 1.17 K/uL  Auto Monocyte # : 1.17 K/uL  Auto Eosinophil # : 0.29 K/uL  Auto Basophil # : 0.00 K/uL  Auto Neutrophil % : 72.0 %  Auto Lymphocyte % : 4.0 %  Auto Monocyte % : 4.0 %  Auto Eosinophil % : 1.0 %  Auto Basophil % : 0.0 %    06-26    134<L>  |  101  |  47.7<H>  ----------------------------<  129<H>  6.2<HH>   |  17.0<L>  |  3.40<H>    Ca    10.1      26 Jun 2022 05:43    TPro  6.6  /  Alb  3.3  /  TBili  0.8  /  DBili  x   /  AST  58<H>  /  ALT  71<H>  /  AlkPhos  148<H>  06-26        CAPILLARY BLOOD GLUCOSE                  Imaging    EKG Personally reviewed available labs, imaging and ekg     Labs  CBC Full  -  ( 26 Jun 2022 05:13 )  WBC Count : 29.21 K/uL  RBC Count : 3.54 M/uL  Hemoglobin : 10.1 g/dL  Hematocrit : 32.3 %  Platelet Count - Automated : 155 K/uL  Mean Cell Volume : 91.2 fl  Mean Cell Hemoglobin : 28.5 pg  Mean Cell Hemoglobin Concentration : 31.3 gm/dL  Auto Neutrophil # : 24.54 K/uL  Auto Lymphocyte # : 1.17 K/uL  Auto Monocyte # : 1.17 K/uL  Auto Eosinophil # : 0.29 K/uL  Auto Basophil # : 0.00 K/uL  Auto Neutrophil % : 72.0 %  Auto Lymphocyte % : 4.0 %  Auto Monocyte % : 4.0 %  Auto Eosinophil % : 1.0 %  Auto Basophil % : 0.0 %    06-26    134<L>  |  101  |  47.7<H>  ----------------------------<  129<H>  6.2<HH>   |  17.0<L>  |  3.40<H>    Ca    10.1      26 Jun 2022 05:43    TPro  6.6  /  Alb  3.3  /  TBili  0.8  /  DBili  x   /  AST  58<H>  /  ALT  71<H>  /  AlkPhos  148<H>  06-26        CAPILLARY BLOOD GLUCOSE                  Imaging  < from: CT Head No Cont (06.09.22 @ 11:33) >    IMPRESSION: 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    --- End of Report ---            ELI GUTIERREZ MD; Attending Radiologist  This document has been electronically signed. Jun 9 2022 11:38AM    < end of copied text >        EKG Personally reviewed available labs, imaging and ekg     Labs  CBC Full  -  ( 26 Jun 2022 05:13 )  WBC Count : 29.21 K/uL  RBC Count : 3.54 M/uL  Hemoglobin : 10.1 g/dL  Hematocrit : 32.3 %  Platelet Count - Automated : 155 K/uL  Mean Cell Volume : 91.2 fl  Mean Cell Hemoglobin : 28.5 pg  Mean Cell Hemoglobin Concentration : 31.3 gm/dL  Auto Neutrophil # : 24.54 K/uL  Auto Lymphocyte # : 1.17 K/uL  Auto Monocyte # : 1.17 K/uL  Auto Eosinophil # : 0.29 K/uL  Auto Basophil # : 0.00 K/uL  Auto Neutrophil % : 72.0 %  Auto Lymphocyte % : 4.0 %  Auto Monocyte % : 4.0 %  Auto Eosinophil % : 1.0 %  Auto Basophil % : 0.0 %    06-26    134<L>  |  101  |  47.7<H>  ----------------------------<  129<H>  6.2<HH>   |  17.0<L>  |  3.40<H>    Ca    10.1      26 Jun 2022 05:43    TPro  6.6  /  Alb  3.3  /  TBili  0.8  /  DBili  x   /  AST  58<H>  /  ALT  71<H>  /  AlkPhos  148<H>  06-26        CAPILLARY BLOOD GLUCOSE                  Imaging  < from: CT Head No Cont (06.09.22 @ 11:33) >    IMPRESSION: 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    --- End of Report ---            ELI GUTIERREZ MD; Attending Radiologist  This document has been electronically signed. Jun 9 2022 11:38AM    < end of copied text >        EKG  < from: 12 Lead ECG (06.26.22 @ 07:56) >    Diagnosis Line Atrial fibrillation with rapid ventricular response with premature ventricular or aberrantly conducted complexes  Nonspecific T wave abnormality  Abnormal ECG    Confirmed by Norma Amato ([id number]) on 6/26/2022 9:02:25 AM    < end of copied text >

## 2022-06-26 NOTE — ED ADULT NURSE REASSESSMENT NOTE - NS ED NURSE REASSESS COMMENT FT1
report received from Miladys SEYMOUR.  Pt sleeping comfortably on stretcher.  No nonverbal indicators of pain present.  Respiration mildly tachypneic on 2L supplemental O2; Coarse breath sounds noted with heavy secretions.  Awaiting bed placement.  PIV wnl; flushing without difficulty.  Pt tachycardic on CM afib noted with HR between 120-135. In NAD, will continue to monitor.

## 2022-06-26 NOTE — H&P ADULT - PROBLEM SELECTOR PLAN 1
- Patient p/w ARF and hyperK  - S/p temporizing measures  - nephro c/s placed - Likely in the setting of pre-renal 2/2 infection. Patient p/w ARF and hyperK  - S/p temporizing measures  - nephro c/s placed- F/u US renal to r/o obstruction  - Kaiser placed with minimal UOP  - started maintenance IVF

## 2022-06-26 NOTE — H&P ADULT - NSHPPHYSICALEXAM_GEN_ALL_CORE
Vital Signs Last 24 Hrs  T(F): 98.4 (26 Jun 2022 07:56), Max: 98.4 (26 Jun 2022 07:56)  HR: 136 (26 Jun 2022 07:56) (117 - 136)  BP: 108/58 (26 Jun 2022 07:56) (87/56 - 112/53)  RR: 23 (26 Jun 2022 07:56) (22 - 24)  SpO2: 100% (26 Jun 2022 07:56) (96% - 100%)    Physical Exam:  Constitutional: NAD, awake and alert, well-developed  Neck: Soft and supple, No LAD, No JVD  Respiratory: cta b/l no wheezing no rhonchi  Cardiovascular: +s1/s2 no edema b/l le  Gastrointestinal: soft nt nd bs+  Vascular: 2+ peripheral pulses  Neurological: A/O x 3, no focal deficits  Musculoskeletal: 5/5 strength b/l upper and lower extremities  Skin:  No obvious pathologic skin lesions   Hematologic / Lymph / Immunologic: No bleeding from IV sites or wounds, No lymphadenopathy, No Hives or allergic type skin lesions Vital Signs Last 24 Hrs  T(F): 98.4 (26 Jun 2022 07:56), Max: 98.4 (26 Jun 2022 07:56)  HR: 136 (26 Jun 2022 07:56) (117 - 136)  BP: 108/58 (26 Jun 2022 07:56) (87/56 - 112/53)  RR: 23 (26 Jun 2022 07:56) (22 - 24)  SpO2: 100% (26 Jun 2022 07:56) (96% - 100%)    Physical Exam:  Constitutional: not following commands, awake, ill-appearing  Respiratory: cta b/l no wheezing no rhonchi  Cardiovascular: +s1/s2 no edema b/l le  Gastrointestinal: soft nt nd bs+  Neurological: A/O x 0, not following commands

## 2022-06-26 NOTE — CONSULT NOTE ADULT - SUBJECTIVE AND OBJECTIVE BOX
[facility]                                                INFECTIOUS DISEASES  =======================================================                               Lew Johnson MD#  Matt Cody MD*                                     Maria R Low MD*    Ale George MD*            Diplomates American Board of Internal Medicine & Infectious Diseases                  # [place] Office - Appt - Tel  [phone] Fax [phone]                * [place] Office - Appt - Tel [phone] Fax [phone]                                  Hospital Consult line:  [phone]  =======================================================      [id number]  JOHNATHON SEVERINO   HPI:  Patient is a 89M w/ hx of DM, HTN, A. fib on Xarelto who presents from home with lethargy. Of note, patient was recently hospitalized for AMS and his hospital c/b renal failure. Patient is lethargic. History obtained from patient's daughter, Lisset. Per daughter, she had noticed hematuria yesterday. In addition, he was confused and had low FS due to decreased PO intake. No f/c/h/d/n/v. Patient was subsequently discharged home w/ home hospice.     In the ED, his vital signs were notable for tachy 117, tachypnea and BP- 80s/50s. Labs notable for leukocytosis, hyperK- 6.2, ARF, and transaminitis.  (26 Jun 2022 08:14)          I have personally reviewed the labs and data; pertinent labs and data are listed in this note; please see below.   =======================================================  Past Medical & Surgical Hx:  =====================  PAST MEDICAL & SURGICAL HISTORY:  Diabetes      A-fib      Hypertension      Neuropathy      No significant past surgical history        Problem List:  ==========  HEALTH ISSUES - PROBLEM Dx:  Acute renal failure    Atrial fibrillation with RVR    Leukocytosis    Urinary retention    T2DM (type 2 diabetes mellitus)    Discharge planning issues    Acute metabolic encephalopathy          Social Hx:  =======  no toxic habits currently    FAMILY HISTORY:  No pertinent family history in first degree relatives    no significant family history of immunosuppressive disorders in mother or father   =======================================================    REVIEW OF SYSTEMS:  CONSTITUTIONAL:  No Fever or chills  HEENT:  No diplopia or blurred vision.  No earache, sore throat or runny nose.  CARDIOVASCULAR:  No pressure, squeezing, strangling, tightness, heaviness or aching about the chest, neck, axilla or epigastrium.  RESPIRATORY:  No cough, shortness of breath  GASTROINTESTINAL:  No nausea, vomiting or diarrhea.  GENITOURINARY:  No dysuria, frequency or urgency. No Blood in urine  MUSCULOSKELETAL:  no joint aches, no muscle pain  SKIN:  No change in skin, hair or nails.  NEUROLOGIC:  No Headaches, seizures or weakness.  PSYCHIATRIC:  No disorder of thought or mood.  ENDOCRINE:  No heat or cold intolerance  HEMATOLOGICAL:  No easy bruising or bleeding.    =======================================================  Allergies    No Known Allergies    Intolerances    Antibiotics:  piperacillin/tazobactam IVPB.. 3.375 Gram(s) IV Intermittent every 12 hours    Other medications:  dextrose 5%. 1000 milliLiter(s) IV Continuous <Continuous>  dextrose 5%. 1000 milliLiter(s) IV Continuous <Continuous>  dextrose 50% Injectable 25 Gram(s) IV Push once  dextrose 50% Injectable 12.5 Gram(s) IV Push once  dextrose 50% Injectable 25 Gram(s) IV Push once  glucagon  Injectable 1 milliGRAM(s) IntraMuscular once  insulin lispro (ADMELOG) corrective regimen sliding scale   SubCutaneous every 6 hours  metoprolol tartrate Injectable 5 milliGRAM(s) IV Push every 6 hours  sodium chloride 0.45% 1000 milliLiter(s) IV Continuous <Continuous>     ceFAZolin   IVPB   100 mL/Hr IV Intermittent (05-26-22 @ 09:13)    piperacillin/tazobactam IVPB..   25 mL/Hr IV Intermittent (06-08-22 @ 22:06)   25 mL/Hr IV Intermittent (06-09-22 @ 07:14)   25 mL/Hr IV Intermittent (06-09-22 @ 15:39)   25 mL/Hr IV Intermittent (06-09-22 @ 23:08)    piperacillin/tazobactam IVPB...   200 mL/Hr IV Intermittent (06-26-22 @ 05:16)    piperacillin/tazobactam IVPB...   200 mL/Hr IV Intermittent (06-08-22 @ 15:50)    piperacillin/tazobactam IVPB...   200 mL/Hr IV Intermittent (05-25-22 @ 11:03)    vancomycin  IVPB.   250 mL/Hr IV Intermittent (06-08-22 @ 16:43)      ======================================================  Physical Exam:  ============  T(F): 98.4 (26 Jun 2022 07:56), Max: 98.4 (26 Jun 2022 07:56)  HR: 119 (26 Jun 2022 10:00)  BP: 106/74 (26 Jun 2022 10:00)  RR: 23 (26 Jun 2022 10:00)  SpO2: 100% (26 Jun 2022 10:00) (96% - 100%)  temp max in last 48H T(F): , Max: 98.4 (06-26-22 @ 07:56)Height (cm): 172.7 (06-26-22 @ 03:40)    General:  No acute distress.  Eye: Pupils are equal, round and reactive to light, Extraocular movements are intact, Normal conjunctiva.  HENT: Normocephalic, Oral mucosa is moist, No pharyngeal erythema, No sinus tenderness.  Neck: Supple, No lymphadenopathy.  Respiratory: Lungs are clear to auscultation, Respirations are non-labored.  Cardiovascular: Normal rate, Regular rhythm,   Gastrointestinal: Soft, Non-tender, Non-distended, Normal bowel sounds.  Genitourinary: No costovertebral angle tenderness.  Lymphatics: No lymphadenopathy neck,   Musculoskeletal: Normal range of motion, Normal strength.  Integumentary: No rash.  Neurologic: Alert, Oriented, No focal deficits, Cranial Nerves II-XII are grossly intact.  Psychiatric: Appropriate mood & affect.    =======================================================  Labs:                        10.1   29.21 )-----------( 155      ( 26 Jun 2022 05:13 )             32.3     06-26    134<L>  |  101  |  47.7<H>  ----------------------------<  129<H>  6.2<HH>   |  17.0<L>  |  3.40<H>    Ca    10.1      26 Jun 2022 05:43  Phos  3.2     06-26    TPro  6.6  /  Alb  3.3  /  TBili  0.8  /  DBili  x   /  AST  58<H>  /  ALT  71<H>  /  AlkPhos  148<H>  06-26       SARS-CoV-2: NotDetec (06-08-22 @ 17:39)  COVID-19 PCR: NotDetec (06-07-22 @ 07:21)  COVID-19 PCR: NotDetec (06-01-22 @ 07:44)                                                   [facility]                                                INFECTIOUS DISEASES  =======================================================                               Lew Johnson MD#  Matt Cody MD*                                     Maria R Low MD*    Ale George MD*            Diplomates American Board of Internal Medicine & Infectious Diseases                  # [place] Office - Appt - Tel  [phone] Fax [phone]                * [place] Office - Appt - Tel [phone] Fax [phone]                                  Hospital Consult line:  [phone]  =======================================================      [id number]  JOHNATHON SEVERINO   HPI:  Patient is a 89M w/ hx of DM, HTN, A. fib on Xarelto who presents from home with lethargy. Of note, patient was recently hospitalized for AMS and his hospital c/b renal failure. Patient is lethargic. History obtained from patient's daughter, Lisset. Per daughter, she had noticed hematuria yesterday. In addition, he was confused and had low FS due to decreased PO intake. No f/c/h/d/n/v. Patient was subsequently discharged home w/ home hospice.     In the ED, his vital signs were notable for tachy 117, tachypnea and BP- 80s/50s. Labs notable for leukocytosis, hyperK- 6.2, ARF, and transaminitis.  (26 Jun 2022 08:14)          I have personally reviewed the labs and data; pertinent labs and data are listed in this note; please see below.   =======================================================  Past Medical & Surgical Hx:  =====================  PAST MEDICAL & SURGICAL HISTORY:  Diabetes      A-fib      Hypertension      Neuropathy      No significant past surgical history        Problem List:  ==========  HEALTH ISSUES - PROBLEM Dx:  Acute renal failure    Atrial fibrillation with RVR    Leukocytosis    Urinary retention    T2DM (type 2 diabetes mellitus)    Discharge planning issues    Acute metabolic encephalopathy          Social Hx:  =======  no toxic habits currently    FAMILY HISTORY:  No pertinent family history in first degree relatives    no significant family history of immunosuppressive disorders in mother or father   =======================================================    REVIEW OF SYSTEMS:  cannot obtain    =======================================================  Allergies    No Known Allergies    Intolerances    Antibiotics:  piperacillin/tazobactam IVPB.. 3.375 Gram(s) IV Intermittent every 12 hours    Other medications:  dextrose 5%. 1000 milliLiter(s) IV Continuous <Continuous>  dextrose 5%. 1000 milliLiter(s) IV Continuous <Continuous>  dextrose 50% Injectable 25 Gram(s) IV Push once  dextrose 50% Injectable 12.5 Gram(s) IV Push once  dextrose 50% Injectable 25 Gram(s) IV Push once  glucagon  Injectable 1 milliGRAM(s) IntraMuscular once  insulin lispro (ADMELOG) corrective regimen sliding scale   SubCutaneous every 6 hours  metoprolol tartrate Injectable 5 milliGRAM(s) IV Push every 6 hours  sodium chloride 0.45% 1000 milliLiter(s) IV Continuous <Continuous>     ceFAZolin   IVPB   100 mL/Hr IV Intermittent (05-26-22 @ 09:13)    piperacillin/tazobactam IVPB..   25 mL/Hr IV Intermittent (06-08-22 @ 22:06)   25 mL/Hr IV Intermittent (06-09-22 @ 07:14)   25 mL/Hr IV Intermittent (06-09-22 @ 15:39)   25 mL/Hr IV Intermittent (06-09-22 @ 23:08)    piperacillin/tazobactam IVPB...   200 mL/Hr IV Intermittent (06-26-22 @ 05:16)    piperacillin/tazobactam IVPB...   200 mL/Hr IV Intermittent (06-08-22 @ 15:50)    piperacillin/tazobactam IVPB...   200 mL/Hr IV Intermittent (05-25-22 @ 11:03)    vancomycin  IVPB.   250 mL/Hr IV Intermittent (06-08-22 @ 16:43)      ======================================================  Physical Exam:  ============  T(F): 98.4 (26 Jun 2022 07:56), Max: 98.4 (26 Jun 2022 07:56)  HR: 119 (26 Jun 2022 10:00)  BP: 106/74 (26 Jun 2022 10:00)  RR: 23 (26 Jun 2022 10:00)  SpO2: 100% (26 Jun 2022 10:00) (96% - 100%)  temp max in last 48H T(F): , Max: 98.4 (06-26-22 @ 07:56)Height (cm): 172.7 (06-26-22 @ 03:40)    General:  No acute distress, lethargic, on o2  Eye: Pupils are equal, round and reactive to light, Extraocular movements are intact, Normal conjunctiva.  HENT: Normocephalic, Oral mucosa is moist, No pharyngeal erythema, No sinus tenderness.  Neck: Supple, No lymphadenopathy.  Respiratory: Lungs are clear to auscultation, Respirations are non-labored.  Cardiovascular: Normal rate, Regular rhythm,   Gastrointestinal: Soft, Non-tender, Non-distended, Normal bowel sounds.  Genitourinary: No costovertebral angle tenderness. conley with empty bag  Lymphatics: No lymphadenopathy neck,   Musculoskeletal: Normal range of motion, Normal strength.  Integumentary: No rash.  Neurologic: lethargic, not following commands    =======================================================  Labs:                        10.1   29.21 )-----------( 155      ( 26 Jun 2022 05:13 )             32.3     06-26    134<L>  |  101  |  47.7<H>  ----------------------------<  129<H>  6.2<HH>   |  17.0<L>  |  3.40<H>    Ca    10.1      26 Jun 2022 05:43  Phos  3.2     06-26    TPro  6.6  /  Alb  3.3  /  TBili  0.8  /  DBili  x   /  AST  58<H>  /  ALT  71<H>  /  AlkPhos  148<H>  06-26       SARS-CoV-2: NotDetec (06-08-22 @ 17:39)  COVID-19 PCR: NotDetec (06-07-22 @ 07:21)  COVID-19 PCR: NotDetec (06-01-22 @ 07:44)       < from: Xray Chest 1 View- PORTABLE-Urgent (Xray Chest 1 View- PORTABLE-Urgent .) (06.26.22 @ 09:12) >    COMPARISON: 5/25/2022    The cardiomediastinal silhouette is normal and the cheyanne are not enlarged.   Aortic calcification. The trachea is midline. Mild platelike atelectatic   streaks at the lung bases. There is no focal lung consolidation or   sizable pleural effusion. No significant osseous abnormality.    IMPRESSION:    Unremarkable frontal chest x ray    --- End of Report ---    < end of copied text >

## 2022-06-26 NOTE — CONSULT NOTE ADULT - ASSESSMENT
89 y/o M w h/o DM, HTN, A. fib on Xarelto who presents from home with lethargy.   Of note, patient was recently hospitalized for AMS and his hospital c/b renal failure    VINCE on CKD suspect stage III  Possible progression of dz h/o uncontrolled DM A1C 10 in MAy  Will check phos and CK level  Renal US ordered r/o obstructive etiology  Holding diuretics  Will give trial of IVF w added bicarb for MA    HyperKalemia Ca2+ insulin given will add Lokelma 10 g x 1    HypoNatremia is mild trend for now     MA likely 2/2 VINCE bicarb added to IVF      89 y/o M w h/o DM, HTN, A. fib on Xarelto who presents from home with lethargy.   Of note, patient was recently hospitalized for AMS and his hospital c/b renal failure    VINCE on CKD suspect stage III  Possible progression of dz h/o uncontrolled DM A1C 10 in MAy  Will check phos and CK level  Renal US ordered r/o obstructive etiology  Holding diuretics  Will give trial of IVF trial w added bicarb for MA    HyperKalemia Ca2+ insulin given will add Lokelma 10 g x 1  If cannot take PO will give Lasix 40mg IV x1    HypoNatremia is mild trend for now     MA likely 2/2 VINCE bicarb added to IVF    Repeat labs this afternoon  Will follow

## 2022-06-26 NOTE — ED ADULT NURSE NOTE - NSIMPLEMENTINTERV_GEN_ALL_ED
Implemented All Fall with Harm Risk Interventions:  [place] to call system. Call bell, personal items and telephone within reach. Instruct patient to call for assistance. Room bathroom lighting operational. Non-slip footwear when patient is off stretcher. Physically safe environment: no spills, clutter or unnecessary equipment. Stretcher in lowest position, wheels locked, appropriate side rails in place. Provide visual cue, wrist band, yellow gown, etc. Monitor gait and stability. Monitor for mental status changes and reorient to person, place, and time. Review medications for side effects contributing to fall risk. Reinforce activity limits and safety measures with patient and family. Provide visual clues: red socks.

## 2022-06-26 NOTE — CONSULT NOTE ADULT - ASSESSMENT
89M w/ hx of DM, HTN, A. fib on Xarelto who presents from home with lethargy. Of note, patient was recently hospitalized for AMS and his hospital stay c/b renal failure. Patient is lethargic. History obtained from patient's daughter, Lisset. Per daughter, she had noticed hematuria 1 day pta. In addition, he was confused and had low FS due to decreased PO intake. No f/c/h/d/n/v. Patient was subsequently discharged home w/ home hospice.     In the ED, his vital signs were notable for tachy 117, tachypnea and BP- 80s/50s. Labs notable for leukocytosis, hyperK- 6.2, ARF, and transaminitis.      - check UA, f/u BCX, UCX-possible UTI  - CXR clear  - Continue zosyn adjusted for renal function  - may need ct a/p  - Trend Fever  - Trend Leukocytosis  - trend renal function  - GOC    d/w attending  Will Follow

## 2022-06-26 NOTE — H&P ADULT - PROBLEM SELECTOR PLAN 3
- Will hold AC given hematuria  - lopressor 2.5 mg Q6H PRN - Will hold AC given hematuria  - lopressor 2.5 mg Q6H PRN for HR> 120  - cards c/s placed - Will hold AC given hematuria  - Discussed with cardiology- lopressor 5mg Q6H w/ hold parameters

## 2022-06-26 NOTE — ED ADULT NURSE NOTE - OBJECTIVE STATEMENT
Pt is on home hospice. Daughter called hospice service but will not be at the house until 9 am so called 911. She does not want her dad to die in the house. SHe will like to a least give him some fluids and antibiotics

## 2022-06-26 NOTE — H&P ADULT - PROBLEM SELECTOR PLAN 4
- Leukocytosis 2/2 unclear source  - C/w IV Zosyn  - F/u blood cx and UA  - Appreciate ID recs - Leukocytosis 2/2 unclear source  - C/w IV Zosyn (renally dosed)  - F/u blood cx and UA  - Appreciate ID recs

## 2022-06-26 NOTE — H&P ADULT - PROBLEM SELECTOR PLAN 7
- DVT ppx- Hold Xarelto given hematuria  - Diet- NPO due to lethargy. C/w IVF    - Updated patient's daughter, HCP (Lisset) on 6/26.  - Had GOC w/ patient's daughter. Patient's daughter undecided on comfort measures and inpatient hospice admission. Hospice c/s placed - DVT ppx- Hold Xarelto given hematuria  - Diet- NPO due to lethargy. C/w IVF  - Dispo- Pending clinical improvement. Hospice c/s placed  CODE STATUS- DNR/DNI    - Updated patient's daughter, HCP (Lisset) on 6/26.  - Had GOC w/ patient's daughter. Patient's daughter undecided on comfort measures and inpatient hospice admission. Hospice c/s placed - DVT ppx- Hold Xarelto given hematuria  - Diet- NPO due to lethargy. C/w IVF  - Dispo- Pending clinical improvement. Hospice c/s placed  CODE STATUS- DNR/DNI    - Updated patient's daughter, HCP (Lisset) on 6/26.  - Had GOC w/ patient's daughter. Patient's daughter undecided on comfort measures and inpatient hospice admission. Hospice c/s placed. Lisset would like trial of IV abx and IVF for 1-2 days prior to considering inpatient hospice.

## 2022-06-26 NOTE — H&P ADULT - PROBLEM SELECTOR PLAN 2
- Hx of baseline dementia  - Found to have leukocytosis. Will do infectious w/u  - - Hx of baseline dementia  - Found to have leukocytosis 2/2 infectious etiology

## 2022-06-26 NOTE — H&P ADULT - HISTORY OF PRESENT ILLNESS
Patient is a 89M w/ hx of DM, HTN, A. fib on Xarelto who presents from home with lethargy. Of note, patient was recently hospitalized for AMS and his hospital c/b renal failure. Patient was subsequently discharged home w/ home hospice.     In the ED, his vital signs were notable for tachy 117, tachypnea and BP- 80s/50s. Labs notable for leukocytosis, hyperK- 6.2, ARF, and transaminitis.  Patient is a 89M w/ hx of DM, HTN, A. fib on Xarelto who presents from home with lethargy. Of note, patient was recently hospitalized for AMS and his hospital c/b renal failure. Patient is lethargic. History obtained from patient's daughter, Lisset. Per daughter, she had noticed hematuria yesterday. In addition, he was confused and had low FS due to decreased PO intake. No f/c/h/d/n/v. Patient was subsequently discharged home w/ home hospice.     In the ED, his vital signs were notable for tachy 117, tachypnea and BP- 80s/50s. Labs notable for leukocytosis, hyperK- 6.2, ARF, and transaminitis.

## 2022-06-26 NOTE — ED PROVIDER NOTE - PROGRESS NOTE DETAILS
I had extensive discussion with the daughter I had extensive discussion (>30 minutes) with the daughter regarding the pt's prognosis and condition. I informed the daughter that given the pt's level of unresponsiveness, his hypotension upon arrival, oliguria, and most importantly his breathing pattern of rapid and shallow breathing that I believed there to be a high probability of the pt not only passing away, but that death was imminent within hours. I repeatedly advocated for comfort care measures. The daughter stated repeatedly and adamantly that she would not want anything invasive - no dialysis, intubation resuscitation, procedures, but that she wants limited medical intervention, specifically requesting IV fluids and antibiotics. She states that she knows that her father is most likely going to die and is at peace with it, but would like to "give him a chance". She refuses to sign updated MOLST with comfort care measures.

## 2022-06-27 LAB
ALBUMIN SERPL ELPH-MCNC: 3.1 G/DL — LOW (ref 3.3–5.2)
ALP SERPL-CCNC: 141 U/L — HIGH (ref 40–120)
ALT FLD-CCNC: 44 U/L — HIGH
ANION GAP SERPL CALC-SCNC: 12 MMOL/L — SIGNIFICANT CHANGE UP (ref 5–17)
ANION GAP SERPL CALC-SCNC: 12 MMOL/L — SIGNIFICANT CHANGE UP (ref 5–17)
AST SERPL-CCNC: 36 U/L — SIGNIFICANT CHANGE UP
BASOPHILS # BLD AUTO: 0.1 K/UL — SIGNIFICANT CHANGE UP (ref 0–0.2)
BASOPHILS NFR BLD AUTO: 0.4 % — SIGNIFICANT CHANGE UP (ref 0–2)
BILIRUB SERPL-MCNC: 0.9 MG/DL — SIGNIFICANT CHANGE UP (ref 0.4–2)
BUN SERPL-MCNC: 50 MG/DL — HIGH (ref 8–20)
BUN SERPL-MCNC: 50 MG/DL — HIGH (ref 8–20)
CALCIUM SERPL-MCNC: 10.9 MG/DL — HIGH (ref 8.6–10.2)
CALCIUM SERPL-MCNC: 10.9 MG/DL — HIGH (ref 8.6–10.2)
CHLORIDE SERPL-SCNC: 102 MMOL/L — SIGNIFICANT CHANGE UP (ref 98–107)
CHLORIDE SERPL-SCNC: 102 MMOL/L — SIGNIFICANT CHANGE UP (ref 98–107)
CO2 SERPL-SCNC: 25 MMOL/L — SIGNIFICANT CHANGE UP (ref 22–29)
CO2 SERPL-SCNC: 25 MMOL/L — SIGNIFICANT CHANGE UP (ref 22–29)
CREAT SERPL-MCNC: 2.47 MG/DL — HIGH (ref 0.5–1.3)
CREAT SERPL-MCNC: 2.47 MG/DL — HIGH (ref 0.5–1.3)
E CLOAC COMP DNA BLD POS QL NAA+PROBE: SIGNIFICANT CHANGE UP
EGFR: 23 ML/MIN/1.73M2 — LOW
EGFR: 23 ML/MIN/1.73M2 — LOW
EOSINOPHIL # BLD AUTO: 0.12 K/UL — SIGNIFICANT CHANGE UP (ref 0–0.5)
EOSINOPHIL NFR BLD AUTO: 0.5 % — SIGNIFICANT CHANGE UP (ref 0–6)
GLUCOSE BLDC GLUCOMTR-MCNC: 125 MG/DL — HIGH (ref 70–99)
GLUCOSE BLDC GLUCOMTR-MCNC: 146 MG/DL — HIGH (ref 70–99)
GLUCOSE BLDC GLUCOMTR-MCNC: 170 MG/DL — HIGH (ref 70–99)
GLUCOSE BLDC GLUCOMTR-MCNC: 177 MG/DL — HIGH (ref 70–99)
GLUCOSE SERPL-MCNC: 156 MG/DL — HIGH (ref 70–99)
GLUCOSE SERPL-MCNC: 156 MG/DL — HIGH (ref 70–99)
GRAM STN FLD: SIGNIFICANT CHANGE UP
HCT VFR BLD CALC: 29.1 % — LOW (ref 39–50)
HGB BLD-MCNC: 9.7 G/DL — LOW (ref 13–17)
IMM GRANULOCYTES NFR BLD AUTO: 0.5 % — SIGNIFICANT CHANGE UP (ref 0–1.5)
LACTATE SERPL-SCNC: 2.2 MMOL/L — HIGH (ref 0.5–2)
LYMPHOCYTES # BLD AUTO: 1.24 K/UL — SIGNIFICANT CHANGE UP (ref 1–3.3)
LYMPHOCYTES # BLD AUTO: 5.1 % — LOW (ref 13–44)
MAGNESIUM SERPL-MCNC: 1.9 MG/DL — SIGNIFICANT CHANGE UP (ref 1.6–2.6)
MCHC RBC-ENTMCNC: 28.8 PG — SIGNIFICANT CHANGE UP (ref 27–34)
MCHC RBC-ENTMCNC: 33.3 GM/DL — SIGNIFICANT CHANGE UP (ref 32–36)
MCV RBC AUTO: 86.4 FL — SIGNIFICANT CHANGE UP (ref 80–100)
METHOD TYPE: SIGNIFICANT CHANGE UP
MONOCYTES # BLD AUTO: 1.61 K/UL — HIGH (ref 0–0.9)
MONOCYTES NFR BLD AUTO: 6.6 % — SIGNIFICANT CHANGE UP (ref 2–14)
NEUTROPHILS # BLD AUTO: 21.18 K/UL — HIGH (ref 1.8–7.4)
NEUTROPHILS NFR BLD AUTO: 86.9 % — HIGH (ref 43–77)
PHOSPHATE SERPL-MCNC: 2.8 MG/DL — SIGNIFICANT CHANGE UP (ref 2.4–4.7)
PLATELET # BLD AUTO: 135 K/UL — LOW (ref 150–400)
POTASSIUM SERPL-MCNC: 5.2 MMOL/L — SIGNIFICANT CHANGE UP (ref 3.5–5.3)
POTASSIUM SERPL-MCNC: 5.2 MMOL/L — SIGNIFICANT CHANGE UP (ref 3.5–5.3)
POTASSIUM SERPL-SCNC: 5.2 MMOL/L — SIGNIFICANT CHANGE UP (ref 3.5–5.3)
POTASSIUM SERPL-SCNC: 5.2 MMOL/L — SIGNIFICANT CHANGE UP (ref 3.5–5.3)
PROT SERPL-MCNC: 6.5 G/DL — LOW (ref 6.6–8.7)
RBC # BLD: 3.37 M/UL — LOW (ref 4.2–5.8)
RBC # FLD: 14.5 % — SIGNIFICANT CHANGE UP (ref 10.3–14.5)
SODIUM SERPL-SCNC: 139 MMOL/L — SIGNIFICANT CHANGE UP (ref 135–145)
SODIUM SERPL-SCNC: 139 MMOL/L — SIGNIFICANT CHANGE UP (ref 135–145)
SPECIMEN SOURCE: SIGNIFICANT CHANGE UP
SPECIMEN SOURCE: SIGNIFICANT CHANGE UP
WBC # BLD: 24.37 K/UL — HIGH (ref 3.8–10.5)
WBC # FLD AUTO: 24.37 K/UL — HIGH (ref 3.8–10.5)

## 2022-06-27 PROCEDURE — 99233 SBSQ HOSP IP/OBS HIGH 50: CPT

## 2022-06-27 PROCEDURE — 76775 US EXAM ABDO BACK WALL LIM: CPT | Mod: 26

## 2022-06-27 PROCEDURE — 70450 CT HEAD/BRAIN W/O DYE: CPT | Mod: 26

## 2022-06-27 PROCEDURE — 36000 PLACE NEEDLE IN VEIN: CPT | Mod: LT

## 2022-06-27 RX ORDER — SODIUM CHLORIDE 9 MG/ML
1000 INJECTION, SOLUTION INTRAVENOUS
Refills: 0 | Status: DISCONTINUED | OUTPATIENT
Start: 2022-06-27 | End: 2022-06-30

## 2022-06-27 RX ORDER — MEROPENEM 1 G/30ML
500 INJECTION INTRAVENOUS ONCE
Refills: 0 | Status: COMPLETED | OUTPATIENT
Start: 2022-06-27 | End: 2022-06-27

## 2022-06-27 RX ORDER — MEROPENEM 1 G/30ML
500 INJECTION INTRAVENOUS EVERY 12 HOURS
Refills: 0 | Status: DISCONTINUED | OUTPATIENT
Start: 2022-06-27 | End: 2022-06-30

## 2022-06-27 RX ORDER — DILTIAZEM HCL 120 MG
5 CAPSULE, EXT RELEASE 24 HR ORAL ONCE
Refills: 0 | Status: COMPLETED | OUTPATIENT
Start: 2022-06-27 | End: 2022-06-27

## 2022-06-27 RX ORDER — METOPROLOL TARTRATE 50 MG
2.5 TABLET ORAL
Refills: 0 | Status: COMPLETED | OUTPATIENT
Start: 2022-06-27 | End: 2022-06-28

## 2022-06-27 RX ADMIN — Medication 1: at 18:16

## 2022-06-27 RX ADMIN — Medication 2.5 MILLIGRAM(S): at 16:57

## 2022-06-27 RX ADMIN — Medication 5 MILLIGRAM(S): at 18:17

## 2022-06-27 RX ADMIN — Medication 5 MILLIGRAM(S): at 09:14

## 2022-06-27 RX ADMIN — Medication 5 MILLIGRAM(S): at 05:34

## 2022-06-27 RX ADMIN — Medication 1: at 11:22

## 2022-06-27 RX ADMIN — Medication 2.5 MILLIGRAM(S): at 19:17

## 2022-06-27 RX ADMIN — Medication 5 MILLIGRAM(S): at 03:44

## 2022-06-27 RX ADMIN — MEROPENEM 100 MILLIGRAM(S): 1 INJECTION INTRAVENOUS at 09:50

## 2022-06-27 RX ADMIN — PIPERACILLIN AND TAZOBACTAM 25 GRAM(S): 4; .5 INJECTION, POWDER, LYOPHILIZED, FOR SOLUTION INTRAVENOUS at 05:36

## 2022-06-27 RX ADMIN — MEROPENEM 100 MILLIGRAM(S): 1 INJECTION INTRAVENOUS at 18:17

## 2022-06-27 NOTE — PROVIDER CONTACT NOTE (CRITICAL VALUE NOTIFICATION) - TEST AND RESULT REPORTED:
potassium 6.2
blood culture bottles collected 6/26 Gram Neg rods in both aerobic and anaerobic bottles
lactate is 2.2

## 2022-06-27 NOTE — PATIENT PROFILE ADULT - FALL HARM RISK - RISK INTERVENTIONS
Assistance OOB with selected safe patient handling equipment/Assistance with ambulation/Communicate Fall Risk and Risk Factors to all staff, patient, and family/Discuss with provider need for PT consult/Monitor gait and stability/Reinforce activity limits and safety measures with patient and family/Visual Cue: Yellow wristband/Bed in lowest position, wheels locked, appropriate side rails in place/Call bell, personal items and telephone in reach/Instruct patient to call for assistance before getting out of bed or chair/Non-slip footwear when patient is out of bed/[place] to call system/Physically safe environment - no spills, clutter or unnecessary equipment/Purposeful Proactive Rounding/Room/bathroom lighting operational, light cord in reach

## 2022-06-27 NOTE — PATIENT PROFILE ADULT - FUNCTIONAL ASSESSMENT - BASIC MOBILITY 4.
Discussed with NS. Despite pain management here in the ED, pt experienced continued discomfort. Will admit for further evaluation and pain management.
1 = Total assistance

## 2022-06-27 NOTE — PATIENT PROFILE ADULT - NSPRONUTRITIONRISK_GEN_A_NUR
Significant decrease of oral intake greater than 3 days prior to admission Pressure injury stage 2 or greater/Significant decrease of oral intake greater than 3 days prior to admission

## 2022-06-28 LAB
ANION GAP SERPL CALC-SCNC: 15 MMOL/L — SIGNIFICANT CHANGE UP (ref 5–17)
BASOPHILS # BLD AUTO: 0.05 K/UL — SIGNIFICANT CHANGE UP (ref 0–0.2)
BASOPHILS NFR BLD AUTO: 0.2 % — SIGNIFICANT CHANGE UP (ref 0–2)
BUN SERPL-MCNC: 50.3 MG/DL — HIGH (ref 8–20)
CALCIUM SERPL-MCNC: 10.6 MG/DL — HIGH (ref 8.6–10.2)
CHLORIDE SERPL-SCNC: 103 MMOL/L — SIGNIFICANT CHANGE UP (ref 98–107)
CO2 SERPL-SCNC: 20 MMOL/L — LOW (ref 22–29)
CREAT SERPL-MCNC: 1.64 MG/DL — HIGH (ref 0.5–1.3)
EGFR: 37 ML/MIN/1.73M2 — LOW
EOSINOPHIL # BLD AUTO: 0.26 K/UL — SIGNIFICANT CHANGE UP (ref 0–0.5)
EOSINOPHIL NFR BLD AUTO: 1.2 % — SIGNIFICANT CHANGE UP (ref 0–6)
GLUCOSE BLDC GLUCOMTR-MCNC: 134 MG/DL — HIGH (ref 70–99)
GLUCOSE BLDC GLUCOMTR-MCNC: 145 MG/DL — HIGH (ref 70–99)
GLUCOSE BLDC GLUCOMTR-MCNC: 158 MG/DL — HIGH (ref 70–99)
GLUCOSE BLDC GLUCOMTR-MCNC: 171 MG/DL — HIGH (ref 70–99)
GLUCOSE BLDC GLUCOMTR-MCNC: 179 MG/DL — HIGH (ref 70–99)
GLUCOSE SERPL-MCNC: 130 MG/DL — HIGH (ref 70–99)
HCT VFR BLD CALC: 31.3 % — LOW (ref 39–50)
HGB BLD-MCNC: 10.3 G/DL — LOW (ref 13–17)
IMM GRANULOCYTES NFR BLD AUTO: 1.3 % — SIGNIFICANT CHANGE UP (ref 0–1.5)
LACTATE BLDV-MCNC: 3.6 MMOL/L — HIGH (ref 0.5–2)
LYMPHOCYTES # BLD AUTO: 1.67 K/UL — SIGNIFICANT CHANGE UP (ref 1–3.3)
LYMPHOCYTES # BLD AUTO: 7.8 % — LOW (ref 13–44)
MAGNESIUM SERPL-MCNC: 2.1 MG/DL — SIGNIFICANT CHANGE UP (ref 1.6–2.6)
MCHC RBC-ENTMCNC: 28.9 PG — SIGNIFICANT CHANGE UP (ref 27–34)
MCHC RBC-ENTMCNC: 32.9 GM/DL — SIGNIFICANT CHANGE UP (ref 32–36)
MCV RBC AUTO: 87.7 FL — SIGNIFICANT CHANGE UP (ref 80–100)
MONOCYTES # BLD AUTO: 1.18 K/UL — HIGH (ref 0–0.9)
MONOCYTES NFR BLD AUTO: 5.5 % — SIGNIFICANT CHANGE UP (ref 2–14)
NEUTROPHILS # BLD AUTO: 17.89 K/UL — HIGH (ref 1.8–7.4)
NEUTROPHILS NFR BLD AUTO: 84 % — HIGH (ref 43–77)
PHOSPHATE SERPL-MCNC: 2.4 MG/DL — SIGNIFICANT CHANGE UP (ref 2.4–4.7)
PLATELET # BLD AUTO: 149 K/UL — LOW (ref 150–400)
POTASSIUM SERPL-MCNC: 4.9 MMOL/L — SIGNIFICANT CHANGE UP (ref 3.5–5.3)
POTASSIUM SERPL-SCNC: 4.9 MMOL/L — SIGNIFICANT CHANGE UP (ref 3.5–5.3)
RBC # BLD: 3.57 M/UL — LOW (ref 4.2–5.8)
RBC # FLD: 14.1 % — SIGNIFICANT CHANGE UP (ref 10.3–14.5)
SODIUM SERPL-SCNC: 138 MMOL/L — SIGNIFICANT CHANGE UP (ref 135–145)
WBC # BLD: 21.33 K/UL — HIGH (ref 3.8–10.5)
WBC # FLD AUTO: 21.33 K/UL — HIGH (ref 3.8–10.5)

## 2022-06-28 PROCEDURE — 99223 1ST HOSP IP/OBS HIGH 75: CPT

## 2022-06-28 PROCEDURE — 99497 ADVNCD CARE PLAN 30 MIN: CPT | Mod: 25

## 2022-06-28 PROCEDURE — 99233 SBSQ HOSP IP/OBS HIGH 50: CPT

## 2022-06-28 RX ADMIN — Medication 1: at 06:04

## 2022-06-28 RX ADMIN — Medication 5 MILLIGRAM(S): at 17:04

## 2022-06-28 RX ADMIN — MEROPENEM 100 MILLIGRAM(S): 1 INJECTION INTRAVENOUS at 05:56

## 2022-06-28 RX ADMIN — Medication 1: at 12:36

## 2022-06-28 RX ADMIN — Medication 5 MILLIGRAM(S): at 12:37

## 2022-06-28 RX ADMIN — Medication 5 MILLIGRAM(S): at 00:18

## 2022-06-28 RX ADMIN — Medication 2.5 MILLIGRAM(S): at 06:31

## 2022-06-28 RX ADMIN — Medication 5 MILLIGRAM(S): at 05:56

## 2022-06-28 RX ADMIN — Medication 2.5 MILLIGRAM(S): at 19:51

## 2022-06-28 RX ADMIN — Medication 2.5 MILLIGRAM(S): at 09:41

## 2022-06-28 RX ADMIN — MEROPENEM 100 MILLIGRAM(S): 1 INJECTION INTRAVENOUS at 17:04

## 2022-06-28 RX ADMIN — Medication 2.5 MILLIGRAM(S): at 01:15

## 2022-06-28 NOTE — CHART NOTE - NSCHARTNOTEFT_GEN_A_CORE
Consult called for AFib with RVR. Patient is a DNR/DNI with daughter considering hospice care.  patient is now in ST 120s, hypotensive and with leukocytosis.  Treat underlying cause and continue home medications.  No plans for further inpatient cardiac work up at this time.  Please reconsult if needed.  Dr. JUAN C Galvez discussed with Dr. Segundo Galvez
palliative care social work note.    SW and palliative care NP Delores Downing spoke with patienst daughter Lisset. Daughter ststes that she and extended family many of which are flying in will be available at home to care for patient and daughter wants patient home as soon as possible. patient had been home on hospice but daughter reports mental status changes after Kaiser inserted. patient currently on IVAB . Inpatient option discussed and daughter adamant that patient is to go home only. SW and NP discussed daughter thoughts with hospitalist Dr Cuba and hospice liaison to be coordinated further with to see if hospice able to reinstate patient to services. Palliative acre to follow.

## 2022-06-28 NOTE — GOALS OF CARE CONVERSATION - ADVANCED CARE PLANNING - WHAT MATTERS MOST
That he be treated with antibiotics- whether to switch to oral ABX is possible but not known yet.    That he return home to be with his family,

## 2022-06-28 NOTE — GOALS OF CARE CONVERSATION - ADVANCED CARE PLANNING - NS PRO AD PATIENT TYPE
Problem: OCCUPATIONAL THERAPY ADULT  Goal: Performs self-care activities at highest level of function for planned discharge setting  See evaluation for individualized goals  Description: Treatment Interventions: ADL retraining, Functional transfer training, UE strengthening/ROM, Endurance training, Patient/family training, Equipment evaluation/education, Compensatory technique education, Energy conservation, Activityengagement, Cognitive reorientation          See flowsheet documentation for full assessment, interventions and recommendations  Outcome: Progressing  Note: Limitation: Decreased ADL status, Decreased UE strength, Decreased Safe judgement during ADL, Decreased cognition, Decreased endurance, Decreased self-care trans, Decreased high-level ADLs  Prognosis: Good  Assessment: Pt agreeable to participate in skilled OT treatment session to address ADL retraining, functional transfer training, endurance training, patient/family training, equipment evaluation/education, energy conservation, activity engagement and activity tolerance  Pt seated in chair upon arrival, with call bell going off  When asked what pt needed, pt stating "I'm confused, how did I get here"  Pt re-oriented  Pt adamantly declining standing and functional mobility at this time  Pt agreeable to grooming tasks sitting in chair, requiring min VC throughout for sequencing of tasks  Pt completing BUE therapeutic exercises in order to increase strength and endurance for ADL tasks  Pt noted to have O2 maintaining 88-92% with functional activity, and HR elevating to 120-130s  Pt with inconsistent cognition and alertness throughout and required to be re-oriented four separate times during the session  Pt notably fatigued and required max rest breaks and VC   Patient continues to be performing below their functional baseline with an increased risk for falls and injury and decreased occupational performance, and would benefit from continued skilled OT treatment to address deficits, The patient's raw score on the AM-PAC Daily Activity inpatient short form is 15, standardized score is 34 69, less than 39 4  Patients at this level are likely to benefit from DC to post-acute rehabilitation services   Continue to recommend PAR upon d/c       OT Discharge Recommendation: [address] [facility] ([facility])

## 2022-06-28 NOTE — CONSULT NOTE ADULT - NSCONSULTADDITIONALINFOA_GEN_ALL_CORE
Total Time Spent_40_ minutes  This includes chart review, patient assessment, discussion and collaboration with Dr Villasenor and Interdisciplinary team members, ACP planning    COUNSELING:  Face to face meeting to discuss Advanced Care Planning - Time Spent __18____Minutes.      Thank you for the opportunity to assist with the care of this patient.   [facility] Palliative Medicine Consult Service [phone].

## 2022-06-28 NOTE — GOALS OF CARE CONVERSATION - ADVANCED CARE PLANNING - TREATMENT GUIDELINE COMMENT
Conitnue antibiotic treatment, as per cultures and ID. Will decide if he should continue on IV ABX or be able to switch to oral

## 2022-06-28 NOTE — CONSULT NOTE ADULT - ASSESSMENT
Assessment and Plan:   Patient is a 89M w/ hx of DM, HTN, A. fib on Xarelto who presents from home with lethargy found to be in A. fib w/ RVR likely 2/2 infection. Now admitted for further workup and management. DNR/DNI.    UTI -   Suspect Pyelonephritis given bacteremia   Sepsis Present on Admission -  Leukocytosis, tachycardia, abnormal UA   GNR Bacteremia   s/p Zosyn   Cont Fernanda   Cultures pending sens    Acute renal failure.   Likely in the setting of pre-renal 2/2 infection.   Patient p/w ARF and hyperK  Potassium normalized 4.9  Nephro c/s   No obstruction  Indwelling Kaiser replaced in ED    Acute Toxic metabolic encephalopathy   in setting of infection and baseline dementia   Found to have leukocytosis 2/2 infectious etiology. 21.33  Treat infection as above    Atrial fibrillation with RVR.   Holding AC because of hematuria  lopressor 5mg Q6H w/ hold parameters    Chronic Urinary retention.   Patient with chronic Kaiser. Kaiser exchanged in ED.    GOC conversation See separate documentation  I spoke with Dr. Villasenor about Patient's return to ED on hospice support  Daughter Lisset called me, explained what happened that led her to call 911 for an ambulance  MOLST DNR/DNI in place  Daughter insists that she take her Dad back home-waiting on urine cultures-suseptible antibiotics?  Would like to continue Hospice support at home-discussed with Otilia Kan   ? IV route necessary vs Oral antibiotics to facilitate his expedite his return home           Assessment and Plan:   Patient is a 89M w/ hx of DM, HTN, A. fib on Xarelto who presents from home with lethargy found to be in A. fib w/ RVR likely 2/2 infection. Now admitted for further workup and management. PMH of Dementia-mental status further deteriorated UTI>Acute renal Failure    UTI -   Suspect Pyelonephritis given bacteremia  Chronic Urinary retention.   Patient with chronic Kaiser. Kaiser exchanged in ED.     Sepsis Present on Admission -  Leukocytosis, tachycardia, abnormal UA   GNR Bacteremia Enterobacter Cloacae   s/p Zosyn   Cont Meropenum   Cultures pending    Acute Toxic metabolic encephalopathy   in setting of infection and baseline dementia   Found to have leukocytosis 2/2 infectious etiology. 21.33  Treat infection as above    Acute renal failure.   Likely in the setting of pre-renal 2/2 infection.   Patient p/w ARF and hyper-K  Potassium normalized 4.9  BUN/Cr 50.3-/1.64  No obstruction  Indwelling Kaiser replaced in ED    Atrial fibrillation with RVR.   Holding AC because of hematuria  lopressor 5mg Q6H w/ hold parameters    GOC conversation See separate documentation  I spoke with Dr. Villasenor about Patient's return to ED on hospice support  Daughter Lisset called me, explained what happened that led her to call 911 for an ambulance  MOLST DNR/DNI in place  Daughter insists that she take her Dad back home-waiting on urine cultures-suseptible antibiotics?  Would like to continue Hospice support at home-discussed with Otilia Kan   ? IV route necessary vs Oral antibiotics to facilitate his expedite his return home

## 2022-06-28 NOTE — GOALS OF CARE CONVERSATION - ADVANCED CARE PLANNING - CONVERSATION DETAILS
Daughter Lisset returned my call, we discussed at length, how her Father had a significant change in mental status,  after having his Kaiser catheter changed by a visiting Nurse, which in her opinion was done with poor technique, she was convinced he would get anther infection because catheter was contaminated.    Nurse did say, her Father was very sick and maybe she should call an ambulance; which she then did.  She realizes that Hospice should be the first one she calls, and she expected they would be more help.  She was alone with her Father, and did not want him to die at home alone.  Family coming to stay with them, and she will be able to handle it better when he comes home; which she insists she and family want-to take him home and continue to care for him.    Since her Dad has returned to his baseline mentally, awake, alert able to tell us his name and answer simple questions Yes/No  We discussed his being on IV Antibiotics, still waiting for cultures and antibiotic recommendations that Dr. Villasenor will discuss with her as the results come back.

## 2022-06-29 LAB
-  AMIKACIN: SIGNIFICANT CHANGE UP
-  AMPICILLIN/SULBACTAM: SIGNIFICANT CHANGE UP
-  AMPICILLIN: SIGNIFICANT CHANGE UP
-  AZTREONAM: SIGNIFICANT CHANGE UP
-  CEFAZOLIN: SIGNIFICANT CHANGE UP
-  CEFEPIME: SIGNIFICANT CHANGE UP
-  CEFOXITIN: SIGNIFICANT CHANGE UP
-  CEFTRIAXONE: SIGNIFICANT CHANGE UP
-  CIPROFLOXACIN: SIGNIFICANT CHANGE UP
-  COAGULASE NEGATIVE STAPHYLOCOCCUS: SIGNIFICANT CHANGE UP
-  ERTAPENEM: SIGNIFICANT CHANGE UP
-  GENTAMICIN: SIGNIFICANT CHANGE UP
-  IMIPENEM: SIGNIFICANT CHANGE UP
-  LEVOFLOXACIN: SIGNIFICANT CHANGE UP
-  MEROPENEM: SIGNIFICANT CHANGE UP
-  PIPERACILLIN/TAZOBACTAM: SIGNIFICANT CHANGE UP
-  TOBRAMYCIN: SIGNIFICANT CHANGE UP
-  TRIMETHOPRIM/SULFAMETHOXAZOLE: SIGNIFICANT CHANGE UP
ANION GAP SERPL CALC-SCNC: 15 MMOL/L — SIGNIFICANT CHANGE UP (ref 5–17)
ANISOCYTOSIS BLD QL: SLIGHT — SIGNIFICANT CHANGE UP
BASOPHILS # BLD AUTO: 0.14 K/UL — SIGNIFICANT CHANGE UP (ref 0–0.2)
BASOPHILS NFR BLD AUTO: 0.9 % — SIGNIFICANT CHANGE UP (ref 0–2)
BUN SERPL-MCNC: 50.6 MG/DL — HIGH (ref 8–20)
CALCIUM SERPL-MCNC: 10.5 MG/DL — HIGH (ref 8.6–10.2)
CHLORIDE SERPL-SCNC: 105 MMOL/L — SIGNIFICANT CHANGE UP (ref 98–107)
CO2 SERPL-SCNC: 19 MMOL/L — LOW (ref 22–29)
CREAT SERPL-MCNC: 1.2 MG/DL — SIGNIFICANT CHANGE UP (ref 0.5–1.3)
CULTURE RESULTS: SIGNIFICANT CHANGE UP
CULTURE RESULTS: SIGNIFICANT CHANGE UP
EGFR: 54 ML/MIN/1.73M2 — LOW
EOSINOPHIL # BLD AUTO: 0.14 K/UL — SIGNIFICANT CHANGE UP (ref 0–0.5)
EOSINOPHIL NFR BLD AUTO: 0.9 % — SIGNIFICANT CHANGE UP (ref 0–6)
GIANT PLATELETS BLD QL SMEAR: PRESENT — SIGNIFICANT CHANGE UP
GLUCOSE BLDC GLUCOMTR-MCNC: 179 MG/DL — HIGH (ref 70–99)
GLUCOSE BLDC GLUCOMTR-MCNC: 195 MG/DL — HIGH (ref 70–99)
GLUCOSE BLDC GLUCOMTR-MCNC: 195 MG/DL — HIGH (ref 70–99)
GLUCOSE SERPL-MCNC: 192 MG/DL — HIGH (ref 70–99)
GRAM STN FLD: SIGNIFICANT CHANGE UP
HCT VFR BLD CALC: 33.9 % — LOW (ref 39–50)
HGB BLD-MCNC: 11 G/DL — LOW (ref 13–17)
LYMPHOCYTES # BLD AUTO: 1.93 K/UL — SIGNIFICANT CHANGE UP (ref 1–3.3)
LYMPHOCYTES # BLD AUTO: 12.4 % — LOW (ref 13–44)
MACROCYTES BLD QL: SLIGHT — SIGNIFICANT CHANGE UP
MAGNESIUM SERPL-MCNC: 2.2 MG/DL — SIGNIFICANT CHANGE UP (ref 1.6–2.6)
MANUAL SMEAR VERIFICATION: SIGNIFICANT CHANGE UP
MCHC RBC-ENTMCNC: 28.8 PG — SIGNIFICANT CHANGE UP (ref 27–34)
MCHC RBC-ENTMCNC: 32.4 GM/DL — SIGNIFICANT CHANGE UP (ref 32–36)
MCV RBC AUTO: 88.7 FL — SIGNIFICANT CHANGE UP (ref 80–100)
METAMYELOCYTES # FLD: 0.9 % — HIGH (ref 0–0)
METHOD TYPE: SIGNIFICANT CHANGE UP
METHOD TYPE: SIGNIFICANT CHANGE UP
MONOCYTES # BLD AUTO: 1.1 K/UL — HIGH (ref 0–0.9)
MONOCYTES NFR BLD AUTO: 7.1 % — SIGNIFICANT CHANGE UP (ref 2–14)
NEUTROPHILS # BLD AUTO: 12.1 K/UL — HIGH (ref 1.8–7.4)
NEUTROPHILS NFR BLD AUTO: 77.8 % — HIGH (ref 43–77)
ORGANISM # SPEC MICROSCOPIC CNT: SIGNIFICANT CHANGE UP
OVALOCYTES BLD QL SMEAR: SLIGHT — SIGNIFICANT CHANGE UP
PHOSPHATE SERPL-MCNC: 2.4 MG/DL — SIGNIFICANT CHANGE UP (ref 2.4–4.7)
PLAT MORPH BLD: NORMAL — SIGNIFICANT CHANGE UP
PLATELET # BLD AUTO: 137 K/UL — LOW (ref 150–400)
POIKILOCYTOSIS BLD QL AUTO: SLIGHT — SIGNIFICANT CHANGE UP
POLYCHROMASIA BLD QL SMEAR: SLIGHT — SIGNIFICANT CHANGE UP
POTASSIUM SERPL-MCNC: 4.9 MMOL/L — SIGNIFICANT CHANGE UP (ref 3.5–5.3)
POTASSIUM SERPL-SCNC: 4.9 MMOL/L — SIGNIFICANT CHANGE UP (ref 3.5–5.3)
RBC # BLD: 3.82 M/UL — LOW (ref 4.2–5.8)
RBC # FLD: 14.6 % — HIGH (ref 10.3–14.5)
RBC BLD AUTO: ABNORMAL
SMUDGE CELLS # BLD: PRESENT — SIGNIFICANT CHANGE UP
SODIUM SERPL-SCNC: 139 MMOL/L — SIGNIFICANT CHANGE UP (ref 135–145)
SPECIMEN SOURCE: SIGNIFICANT CHANGE UP
WBC # BLD: 15.55 K/UL — HIGH (ref 3.8–10.5)
WBC # FLD AUTO: 15.55 K/UL — HIGH (ref 3.8–10.5)

## 2022-06-29 PROCEDURE — 99232 SBSQ HOSP IP/OBS MODERATE 35: CPT

## 2022-06-29 PROCEDURE — 99233 SBSQ HOSP IP/OBS HIGH 50: CPT

## 2022-06-29 RX ORDER — METOPROLOL TARTRATE 50 MG
100 TABLET ORAL EVERY 12 HOURS
Refills: 0 | Status: DISCONTINUED | OUTPATIENT
Start: 2022-06-29 | End: 2022-06-30

## 2022-06-29 RX ORDER — METOPROLOL TARTRATE 50 MG
5 TABLET ORAL
Refills: 0 | Status: DISCONTINUED | OUTPATIENT
Start: 2022-06-29 | End: 2022-06-30

## 2022-06-29 RX ORDER — RIVAROXABAN 15 MG-20MG
20 KIT ORAL DAILY
Refills: 0 | Status: DISCONTINUED | OUTPATIENT
Start: 2022-06-29 | End: 2022-06-30

## 2022-06-29 RX ORDER — DILTIAZEM HCL 120 MG
60 CAPSULE, EXT RELEASE 24 HR ORAL EVERY 8 HOURS
Refills: 0 | Status: DISCONTINUED | OUTPATIENT
Start: 2022-06-29 | End: 2022-06-30

## 2022-06-29 RX ADMIN — Medication 5 MILLIGRAM(S): at 05:55

## 2022-06-29 RX ADMIN — MEROPENEM 100 MILLIGRAM(S): 1 INJECTION INTRAVENOUS at 05:55

## 2022-06-29 RX ADMIN — Medication 1: at 06:03

## 2022-06-29 RX ADMIN — Medication 5 MILLIGRAM(S): at 17:03

## 2022-06-29 RX ADMIN — Medication 60 MILLIGRAM(S): at 21:35

## 2022-06-29 RX ADMIN — Medication 5 MILLIGRAM(S): at 12:09

## 2022-06-29 RX ADMIN — Medication 5 MILLIGRAM(S): at 00:31

## 2022-06-29 RX ADMIN — Medication 100 MILLIGRAM(S): at 17:43

## 2022-06-29 RX ADMIN — RIVAROXABAN 20 MILLIGRAM(S): KIT at 18:31

## 2022-06-29 RX ADMIN — Medication 1: at 17:42

## 2022-06-29 RX ADMIN — MEROPENEM 100 MILLIGRAM(S): 1 INJECTION INTRAVENOUS at 17:43

## 2022-06-29 RX ADMIN — Medication 1: at 12:10

## 2022-06-29 RX ADMIN — Medication 5 MILLIGRAM(S): at 10:33

## 2022-06-29 NOTE — DIETITIAN INITIAL EVALUATION ADULT - SIGNS/SYMPTOMS
as evidenced by physical signs of mod/severe muscle/fat loss, meeting less then 75% est needs > 1 mo

## 2022-06-29 NOTE — DIETITIAN INITIAL EVALUATION ADULT - PERTINENT LABORATORY DATA
06-29    139  |  105  |  50.6<H>  ----------------------------<  192<H>  4.9   |  19.0<L>  |  1.20    Ca    10.5<H>      29 Jun 2022 07:52  Phos  2.4     06-29  Mg     2.2     06-29    POCT Blood Glucose.: 195 mg/dL (06-29-22 @ 06:01)  A1C with Estimated Average Glucose Result: 10.3 % (05-27-22 @ 09:23)

## 2022-06-29 NOTE — DIETITIAN INITIAL EVALUATION ADULT - ORAL INTAKE PTA/DIET HISTORY
Pt extremely lethargic; unable to interview. Aware pt with extremely poor PO intake. Pt remains NPO at this time secondary lethargy.

## 2022-06-29 NOTE — DIETITIAN INITIAL EVALUATION ADULT - ETIOLOGY
Related to inability to meet sufficient protein energy needs in setting of advances age with worsening mental status, sepsis, UTI, VINCE, Bacteremia

## 2022-06-29 NOTE — DIETITIAN INITIAL EVALUATION ADULT - OTHER INFO
Patient is a 89 year old Male w/ hx of DM, HTN, A. fib on Xarelto, dementia who presents from home with lethargy. Of note, patient was recently hospitalized for AMS and his hospital c/b renal failure. Patient is lethargic. History obtained from patient's daughter, Lisset. Per daughter, she had noticed hematuria yesterday. In addition, he was confused and had low FS due to decreased PO intake. Patient was subsequently discharged home w/ home hospice.   Pt admitted with VINCE, Sepsis, UTI, Bacteremia. Aware Palliative following for GOC.

## 2022-06-29 NOTE — DIETITIAN INITIAL EVALUATION ADULT - NSFNSPHYEXAMSKINFT_GEN_A_CORE
Pressure Injury 1: sacrum, Stage II  Pressure Injury 2: none, none  Pressure Injury 3: none, none  Pressure Injury 4: none, none  Pressure Injury 5: none, none  Pressure Injury 6: none, none  Pressure Injury 7: none, none  Pressure Injury 8: none, none  Pressure Injury 9: none, none  Pressure Injury 10: none, none  Pressure Injury 11: none, none

## 2022-06-29 NOTE — SWALLOW BEDSIDE ASSESSMENT ADULT - SLP PERTINENT HISTORY OF CURRENT PROBLEM
Pt known to this service, last seen 5/30/22 with a rx for soft/bite sized solids, mildly thick liquids. Patient is a 89M w/ hx of DM, HTN, A. fib on Xarelto who presents from home with lethargy found to be in A. fib w/ RVR likely 2/2 infection. Now admitted for further workup and management. DNR/DNI. Sepsis Present on Admission -  Leukocytosis, tachycardia, abnormal UA. UTI - Suspect Pyelonephritis given bacteremia. Acute Toxic metabolic encephalopathy in setting of infection on baseline dementia

## 2022-06-29 NOTE — SWALLOW BEDSIDE ASSESSMENT ADULT - SWALLOW EVAL: DIAGNOSIS
Oral and pharyngeal stages of swallow appear functional for pureed solids and moderately thick liquids. AT least mild oral dysphagia with minced/moist solids marked by prolonged mastication and lingual stasis post swallow, +piecemeal deglutition and suspected posterior loss of fluids. Cannot r/o pharyngeal dysphagia with mildly thick liquids.

## 2022-06-29 NOTE — SWALLOW BEDSIDE ASSESSMENT ADULT - SLP GENERAL OBSERVATIONS
Pt received awake on stretcher in ED, 0x0, nonverbal, reduced cognition, not following commands, [place] creole speaking, language line utilized, on room air, nonverbal pain 0/10 pre/post

## 2022-06-29 NOTE — SWALLOW BEDSIDE ASSESSMENT ADULT - ORAL PREPARATORY PHASE
mildly redued oral grading, "chewing" of puree bolus mildly reduced oral grading/Decreased mastication ability "swishing" of bolus/Reduced oral grading

## 2022-06-29 NOTE — DIETITIAN INITIAL EVALUATION ADULT - PERTINENT MEDS FT
MEDICATIONS  (STANDING):  dextrose 5%. 1000 milliLiter(s) (50 mL/Hr) IV Continuous <Continuous>  dextrose 5%. 1000 milliLiter(s) (100 mL/Hr) IV Continuous <Continuous>  dextrose 50% Injectable 25 Gram(s) IV Push once  dextrose 50% Injectable 12.5 Gram(s) IV Push once  dextrose 50% Injectable 25 Gram(s) IV Push once  glucagon  Injectable 1 milliGRAM(s) IntraMuscular once  insulin lispro (ADMELOG) corrective regimen sliding scale   SubCutaneous every 6 hours  meropenem  IVPB 500 milliGRAM(s) IV Intermittent every 12 hours  metoprolol tartrate Injectable 5 milliGRAM(s) IV Push every 6 hours  sodium chloride 0.45%. 1000 milliLiter(s) (75 mL/Hr) IV Continuous <Continuous>    MEDICATIONS  (PRN):  dextrose Oral Gel 15 Gram(s) Oral once PRN Blood Glucose LESS THAN 70 milliGRAM(s)/deciliter  glycopyrrolate Injectable 0.4 milliGRAM(s) IV Push four times a day PRN Secretions  ondansetron Injectable 4 milliGRAM(s) IV Push every 8 hours PRN Nausea and/or Vomiting

## 2022-06-29 NOTE — SWALLOW BEDSIDE ASSESSMENT ADULT - ORAL PHASE
confounded by suspected reudced attention to bolus/Delayed oral transit time Decreased anterior-posterior movement of the bolus/Delayed oral transit time/Lingual stasis reduced attention to bolus, piecemeal deglutition

## 2022-06-30 ENCOUNTER — TRANSCRIPTION ENCOUNTER (OUTPATIENT)
Age: 87
End: 2022-06-30

## 2022-06-30 VITALS
DIASTOLIC BLOOD PRESSURE: 91 MMHG | OXYGEN SATURATION: 100 % | RESPIRATION RATE: 19 BRPM | HEART RATE: 121 BPM | SYSTOLIC BLOOD PRESSURE: 149 MMHG | TEMPERATURE: 98 F

## 2022-06-30 LAB
24R-OH-CALCIDIOL SERPL-MCNC: 40.1 NG/ML — SIGNIFICANT CHANGE UP (ref 30–80)
ANION GAP SERPL CALC-SCNC: 15 MMOL/L — SIGNIFICANT CHANGE UP (ref 5–17)
BASOPHILS # BLD AUTO: 0 K/UL — SIGNIFICANT CHANGE UP (ref 0–0.2)
BASOPHILS NFR BLD AUTO: 0 % — SIGNIFICANT CHANGE UP (ref 0–2)
BUN SERPL-MCNC: 46 MG/DL — HIGH (ref 8–20)
CALCIUM SERPL-MCNC: 11 MG/DL — HIGH (ref 8.6–10.2)
CHLORIDE SERPL-SCNC: 110 MMOL/L — HIGH (ref 98–107)
CO2 SERPL-SCNC: 20 MMOL/L — LOW (ref 22–29)
CREAT SERPL-MCNC: 1.1 MG/DL — SIGNIFICANT CHANGE UP (ref 0.5–1.3)
CULTURE RESULTS: SIGNIFICANT CHANGE UP
EGFR: 60 ML/MIN/1.73M2 — SIGNIFICANT CHANGE UP
EOSINOPHIL # BLD AUTO: 0.13 K/UL — SIGNIFICANT CHANGE UP (ref 0–0.5)
EOSINOPHIL NFR BLD AUTO: 0.9 % — SIGNIFICANT CHANGE UP (ref 0–6)
GLUCOSE BLDC GLUCOMTR-MCNC: 166 MG/DL — HIGH (ref 70–99)
GLUCOSE BLDC GLUCOMTR-MCNC: 205 MG/DL — HIGH (ref 70–99)
GLUCOSE BLDC GLUCOMTR-MCNC: 250 MG/DL — HIGH (ref 70–99)
GLUCOSE SERPL-MCNC: 221 MG/DL — HIGH (ref 70–99)
HCT VFR BLD CALC: 34.2 % — LOW (ref 39–50)
HGB BLD-MCNC: 10.8 G/DL — LOW (ref 13–17)
LYMPHOCYTES # BLD AUTO: 1.47 K/UL — SIGNIFICANT CHANGE UP (ref 1–3.3)
LYMPHOCYTES # BLD AUTO: 10.3 % — LOW (ref 13–44)
MAGNESIUM SERPL-MCNC: 2.1 MG/DL — SIGNIFICANT CHANGE UP (ref 1.6–2.6)
MANUAL SMEAR VERIFICATION: SIGNIFICANT CHANGE UP
MCHC RBC-ENTMCNC: 28.2 PG — SIGNIFICANT CHANGE UP (ref 27–34)
MCHC RBC-ENTMCNC: 31.6 GM/DL — LOW (ref 32–36)
MCV RBC AUTO: 89.3 FL — SIGNIFICANT CHANGE UP (ref 80–100)
METAMYELOCYTES # FLD: 3.4 % — HIGH (ref 0–0)
MONOCYTES # BLD AUTO: 1.12 K/UL — HIGH (ref 0–0.9)
MONOCYTES NFR BLD AUTO: 7.8 % — SIGNIFICANT CHANGE UP (ref 2–14)
NEUTROPHILS # BLD AUTO: 11.11 K/UL — HIGH (ref 1.8–7.4)
NEUTROPHILS NFR BLD AUTO: 77.6 % — HIGH (ref 43–77)
ORGANISM # SPEC MICROSCOPIC CNT: SIGNIFICANT CHANGE UP
ORGANISM # SPEC MICROSCOPIC CNT: SIGNIFICANT CHANGE UP
PHOSPHATE SERPL-MCNC: 2.1 MG/DL — LOW (ref 2.4–4.7)
PLAT MORPH BLD: NORMAL — SIGNIFICANT CHANGE UP
PLATELET # BLD AUTO: 173 K/UL — SIGNIFICANT CHANGE UP (ref 150–400)
POTASSIUM SERPL-MCNC: 4.2 MMOL/L — SIGNIFICANT CHANGE UP (ref 3.5–5.3)
POTASSIUM SERPL-SCNC: 4.2 MMOL/L — SIGNIFICANT CHANGE UP (ref 3.5–5.3)
PTH-INTACT FLD-MCNC: 77 PG/ML — HIGH (ref 15–65)
RBC # BLD: 3.83 M/UL — LOW (ref 4.2–5.8)
RBC # FLD: 14.7 % — HIGH (ref 10.3–14.5)
RBC BLD AUTO: NORMAL — SIGNIFICANT CHANGE UP
SODIUM SERPL-SCNC: 145 MMOL/L — SIGNIFICANT CHANGE UP (ref 135–145)
SPECIMEN SOURCE: SIGNIFICANT CHANGE UP
VIT D25+D1,25 OH+D1,25 PNL SERPL-MCNC: 69 PG/ML — SIGNIFICANT CHANGE UP (ref 19.9–79.3)
WBC # BLD: 14.32 K/UL — HIGH (ref 3.8–10.5)
WBC # FLD AUTO: 14.32 K/UL — HIGH (ref 3.8–10.5)

## 2022-06-30 PROCEDURE — 99239 HOSP IP/OBS DSCHRG MGMT >30: CPT

## 2022-06-30 PROCEDURE — 99232 SBSQ HOSP IP/OBS MODERATE 35: CPT

## 2022-06-30 RX ORDER — CIPROFLOXACIN LACTATE 400MG/40ML
1 VIAL (ML) INTRAVENOUS
Qty: 26 | Refills: 0
Start: 2022-06-30 | End: 2022-07-12

## 2022-06-30 RX ORDER — INSULIN GLARGINE 100 [IU]/ML
5 INJECTION, SOLUTION SUBCUTANEOUS
Qty: 30 | Refills: 0
Start: 2022-06-30 | End: 2022-07-29

## 2022-06-30 RX ORDER — SODIUM CHLORIDE 9 MG/ML
1000 INJECTION INTRAMUSCULAR; INTRAVENOUS; SUBCUTANEOUS
Refills: 0 | Status: DISCONTINUED | OUTPATIENT
Start: 2022-06-30 | End: 2022-06-30

## 2022-06-30 RX ORDER — ASPIRIN/CALCIUM CARB/MAGNESIUM 324 MG
1 TABLET ORAL
Qty: 0 | Refills: 0 | DISCHARGE

## 2022-06-30 RX ADMIN — Medication 100 MILLIGRAM(S): at 06:41

## 2022-06-30 RX ADMIN — Medication 5 MILLIGRAM(S): at 00:23

## 2022-06-30 RX ADMIN — MEROPENEM 100 MILLIGRAM(S): 1 INJECTION INTRAVENOUS at 07:08

## 2022-06-30 RX ADMIN — Medication 2: at 12:00

## 2022-06-30 RX ADMIN — Medication 2: at 06:45

## 2022-06-30 RX ADMIN — Medication 5 MILLIGRAM(S): at 04:55

## 2022-06-30 RX ADMIN — Medication 1: at 00:31

## 2022-06-30 RX ADMIN — Medication 60 MILLIGRAM(S): at 06:40

## 2022-06-30 RX ADMIN — RIVAROXABAN 20 MILLIGRAM(S): KIT at 12:01

## 2022-06-30 NOTE — DISCHARGE NOTE PROVIDER - ATTENDING DISCHARGE PHYSICAL EXAMINATION:
VITALS:   T(C): 36.5 (06-30-22 @ 04:42), Max: 36.5 (06-29-22 @ 12:12)  HR: 140 (06-30-22 @ 04:42) (98 - 143)  BP: 136/96 (06-30-22 @ 04:42) (122/88 - 148/54)  RR: 18 (06-30-22 @ 04:42) (18 - 18)  SpO2: 99% (06-30-22 @ 04:42) (97% - 99%)    GENERAL: NAD, lying in bed comfortably  HEAD:  Atraumatic, Normocephalic  EYES: EOMI, PERRLA, conjunctiva and sclera clear  ENT: Moist mucous membranes  NECK: Supple, No JVD  CHEST/LUNG: Clear to auscultation bilaterally; No rales, rhonchi, wheezing, or rubs. Unlabored respirations  HEART: Regular rate and irreg rhythm; No murmurs, rubs, or gallops  ABDOMEN: BSx4; Soft, nontender, nondistended  EXTREMITIES:  2+ Peripheral Pulses, brisk capillary refill. No clubbing, cyanosis, or edema  NERVOUS SYSTEM:  A&O1-2, no focal deficits   SKIN: No rashes or lesions  PSYCH: Normal affect, euthymic mood

## 2022-06-30 NOTE — DISCHARGE NOTE NURSING/CASE MANAGEMENT/SOCIAL WORK - NSDCPEFALRISK_GEN_ALL_CORE
For information on Fall & Injury Prevention, visit: [email or web address] OR  [email or web address] OR  https://www.cdc.gov/steadi/patient.html

## 2022-06-30 NOTE — DISCHARGE NOTE NURSING/CASE MANAGEMENT/SOCIAL WORK - PATIENT PORTAL LINK FT
You can access the FollowMyHealth Patient Portal offered by [facility] by registering at the following website: [email or web address]. By joining [facility]’s FollowMyHealth portal, you will also be able to view your health information using other applications (apps) compatible with our system.

## 2022-06-30 NOTE — PROGRESS NOTE ADULT - PROVIDER SPECIALTY LIST ADULT
Nephrology
Hospitalist
Infectious Disease
Nephrology
Nephrology
Hospitalist
Hospitalist
Nephrology

## 2022-06-30 NOTE — DISCHARGE NOTE PROVIDER - NSDCHHCONTACT_GEN_ALL_CORE_FT
COVID-19 Virtual Visit     Assessment/Plan:    Problem List Items Addressed This Visit     None      Visit Diagnoses     COVID-19 virus infection    -  Primary         Disposition:     I recommended continued isolation until at least 24 hours have passed since recovery defined as resolution of fever without the use of fever-reducing medications AND improvement in COVID symptoms AND 10 days have passed since onset of symptoms (or 10 days have passed since date of first positive viral diagnostic test for asymptomatic patients)  Can d/c isolation starting on 2/5  Can return to school starting 2/8  Call with any fever or worsening symptoms  I have spent 10 minutes directly with the patient  Greater than 50% of this time was spent in counseling/coordination of care regarding: instructions for management, patient and family education, risk factor reductions and impressions  Encounter provider LILIAM Markham    Provider located at [address] [place] [address]  [address] [place] [address]    Recent Visits  Date Type Provider Dept   02/01/21 Telemedicine [facility]   [facility]   Showing recent visits within past 7 days and meeting all other requirements     Today's Visits  Date Type Provider Dept   [facility]   Showing today's visits and meeting all other requirements     Future Appointments  No visits were found meeting these conditions  Showing future appointments within next 150 days and meeting all other requirements      This virtual check-in was done via [facility] and patient was informed that this is a secure, HIPAA-compliant platform  She agrees to proceed      Patient agrees to participate in a virtual check in via telephone or video visit instead of presenting to the office to address urgent/immediate medical needs  Patient is aware this is a billable service  After connecting through [facility], the patient was identified by name and date of birth  Evangelina Carreon was informed that this was a telemedicine visit and that the exam was being conducted confidentially over secure lines  My office door was closed  No one else was in the room  Evangelina Carreon acknowledged consent and understanding of privacy and security of the telemedicine visit  I informed the patient that I have reviewed her record in Epic and presented the opportunity for her to ask any questions regarding the visit today  The patient agreed to participate  Subjective:   Evangelina Carreon is a 13 y o  female who has been screened for COVID-19  Symptom change since last report: improving  Patient's symptoms include fatigue, nasal congestion, sore throat, cough and headache  Patient denies fever, chills, malaise, rhinorrhea, anosmia, loss of taste, shortness of breath, chest tightness, abdominal pain, nausea, vomiting, diarrhea and myalgias  Date of symptom onset: 1/24/2021  Date of exposure: 1/24/2021  Date of positive COVID-19 PCR: 1/26/2021    Feeling much better  No further issues with breathing       Lab Results   Component Value Date    SARSCOV2 Positive (A) 01/26/2021    SARSCOV2 Not Detected 12/11/2020     Past Medical History:   Diagnosis Date    Closed displaced fracture of proximal phalanx of right ring finger 07/20/2016    Last assessed    Closed fracture of lower leg with routine healing 12/22/2015    Last assessed    Eczema 08/31/2012    Last assessed    Salter-Feliz Type I fracture of lower end of fibula 07/24/2015    Last assessed    Scoliosis      Past Surgical History:   Procedure Laterality Date    ADENOIDECTOMY      TONSILLECTOMY       Current Outpatient Medications   Medication Sig Dispense Refill    albuterol (PROAIR HFA) 90 mcg/act inhaler Inhale 2 puffs every 6 (six) hours as needed for wheezing (Patient not taking: Reported on 10/13/2020) 8 5 g 0     No current facility-administered medications for this visit  Allergies   Allergen Reactions    Cefdinir Rash     Action Taken: mother denies drug allergy; Review of Systems   Constitutional: Positive for fatigue  Negative for chills and fever  HENT: Positive for congestion and sore throat  Negative for rhinorrhea  Respiratory: Positive for cough  Negative for chest tightness and shortness of breath  Gastrointestinal: Negative for abdominal pain, diarrhea, nausea and vomiting  Musculoskeletal: Negative for myalgias  Neurological: Positive for headaches  Objective:    Vitals:    02/03/21 1249   Weight: 61 2 kg (135 lb)   Height: 5' 6 5" (1 689 m)       Physical Exam  Vitals signs reviewed  Constitutional:       General: She is not in acute distress  Appearance: She is well-developed  She is not ill-appearing  Pulmonary:      Effort: Pulmonary effort is normal    Neurological:      Mental Status: She is alert and oriented to person, place, and time  Psychiatric:         Mood and Affect: Mood normal          Behavior: Behavior normal        VIRTUAL VISIT DISCLAIMER    Wandy Yeh acknowledges that she has consented to an online visit or consultation  She understands that the online visit is based solely on information provided by her, and that, in the absence of a face-to-face physical evaluation by the physician, the diagnosis she receives is both limited and provisional in terms of accuracy and completeness  This is not intended to replace a full medical face-to-face evaluation by the physician  Wandy Yeh understands and accepts these terms  As certified below, I, or a nurse practitioner or physician assistant working with me, had a face-to-face encounter that meets the physician face-to-face encounter requirements.

## 2022-06-30 NOTE — PROGRESS NOTE ADULT - SUBJECTIVE AND OBJECTIVE BOX
[facility] Division of Hospital Medicine    Chief Complaint:    sepsis    SUBJECTIVE / OVERNIGHT EVENTS:  no overnight events    Patient denies chest pain, SOB, abd pain, N/V, fever, chills, dysuria or any other complaints. All remainder ROS negative.     MEDICATIONS  (STANDING):  dextrose 5%. 1000 milliLiter(s) (50 mL/Hr) IV Continuous <Continuous>  dextrose 5%. 1000 milliLiter(s) (100 mL/Hr) IV Continuous <Continuous>  dextrose 50% Injectable 25 Gram(s) IV Push once  dextrose 50% Injectable 12.5 Gram(s) IV Push once  dextrose 50% Injectable 25 Gram(s) IV Push once  glucagon  Injectable 1 milliGRAM(s) IntraMuscular once  insulin lispro (ADMELOG) corrective regimen sliding scale   SubCutaneous every 6 hours  meropenem  IVPB 500 milliGRAM(s) IV Intermittent every 12 hours  metoprolol tartrate Injectable 5 milliGRAM(s) IV Push every 6 hours  sodium chloride 0.45%. 1000 milliLiter(s) (75 mL/Hr) IV Continuous <Continuous>    MEDICATIONS  (PRN):  dextrose Oral Gel 15 Gram(s) Oral once PRN Blood Glucose LESS THAN 70 milliGRAM(s)/deciliter  glycopyrrolate Injectable 0.4 milliGRAM(s) IV Push four times a day PRN Secretions  metoprolol tartrate Injectable 5 milliGRAM(s) IV Push every 30 minutes PRN HR Sustained over 130  ondansetron Injectable 4 milliGRAM(s) IV Push every 8 hours PRN Nausea and/or Vomiting        I&O's Summary      PHYSICAL EXAM:  Vital Signs Last 24 Hrs  T(C): 36.5 (29 Jun 2022 12:12), Max: 36.9 (28 Jun 2022 19:55)  T(F): 97.7 (29 Jun 2022 12:12), Max: 98.4 (28 Jun 2022 19:55)  HR: 137 (29 Jun 2022 12:12) (66 - 159)  BP: 122/88 (29 Jun 2022 12:12) (114/79 - 138/72)  BP(mean): --  RR: 18 (29 Jun 2022 12:12) (18 - 18)  SpO2: 99% (29 Jun 2022 12:12) (98% - 100%)        CONSTITUTIONAL: NAD, elderly, chronically ill appearing  ENMT: Moist oral mucosa, no pharyngeal injection or exudates; normal dentition  RESPIRATORY: Normal respiratory effort; lungs are clear to auscultation bilaterally  CARDIOVASCULAR: Regular rate and rhythm, normal S1 and S2, no murmur/rub/gallop; Peripheral pulses are 2+ bilaterally  ABDOMEN: Nontender to palpation, normoactive bowel sounds, no rebound/guarding;   MUSCLOSKELETAL:  No clubbing or cyanosis of digits; no joint swelling or tenderness to palpation  PSYCH: A+O to person, place, and time; affect appropriate  NEUROLOGY: CN 2-12 are intact and symmetric; no gross sensory deficits;   SKIN: No rashes; no palpable lesions    LABS:                        11.0   15.55 )-----------( 137      ( 29 Jun 2022 07:52 )             33.9     06-29    139  |  105  |  50.6<H>  ----------------------------<  192<H>  4.9   |  19.0<L>  |  1.20    Ca    10.5<H>      29 Jun 2022 07:52  Phos  2.4     06-29  Mg     2.2     06-29                Culture - Blood (collected 28 Jun 2022 12:50)  Source: .Blood Blood  Preliminary Report (29 Jun 2022 13:01):    No growth to date.    Culture - Blood (collected 28 Jun 2022 12:25)  Source: .Blood Blood  Gram Stain (29 Jun 2022 10:56):    Growth in aerobic bottle: Gram Positive Cocci in Clusters  Preliminary Report (29 Jun 2022 10:56):    Growth in aerobic bottle: Gram Positive Cocci in Clusters    ***Blood Panel PCR results on this specimen are available    approximately 3 hours after the Gram stain result.***    Gram stain, PCR, and/or culture results may not always    correspond due to difference in methodologies.    ************************************************************    This PCR assay was performed by multiplex PCR. This    Assay tests for 66 bacterial and resistance gene targets.    Please refer to the [facility] test directory    at [email or web address] for details.  Organism: Blood Culture PCR (29 Jun 2022 12:51)  Organism: Blood Culture PCR (29 Jun 2022 12:51)    Culture - Urine (collected 28 Jun 2022 08:31)  Source: Clean Catch Clean Catch (Midstream)  Preliminary Report (29 Jun 2022 08:32):    10,000 - 49,000 CFU/mL Gram positive organisms      CAPILLARY BLOOD GLUCOSE      POCT Blood Glucose.: 195 mg/dL (29 Jun 2022 11:48)  POCT Blood Glucose.: 195 mg/dL (29 Jun 2022 06:01)  POCT Blood Glucose.: 179 mg/dL (28 Jun 2022 23:47)  POCT Blood Glucose.: 145 mg/dL (28 Jun 2022 16:44)        RADIOLOGY & ADDITIONAL TESTS:  Results Reviewed:   Imaging Personally Reviewed:  Electrocardiogram Personally Reviewed:                                          
NEPHROLOGY INTERVAL HPI/OVERNIGHT EVENTS:  pt resting comfortably when seen earlier  no acute distress noted    MEDICATIONS  (STANDING):  dextrose 5%. 1000 milliLiter(s) (50 mL/Hr) IV Continuous <Continuous>  dextrose 5%. 1000 milliLiter(s) (100 mL/Hr) IV Continuous <Continuous>  dextrose 50% Injectable 25 Gram(s) IV Push once  dextrose 50% Injectable 12.5 Gram(s) IV Push once  dextrose 50% Injectable 25 Gram(s) IV Push once  glucagon  Injectable 1 milliGRAM(s) IntraMuscular once  insulin lispro (ADMELOG) corrective regimen sliding scale   SubCutaneous every 6 hours  meropenem  IVPB 500 milliGRAM(s) IV Intermittent every 12 hours  metoprolol tartrate Injectable 5 milliGRAM(s) IV Push every 6 hours  sodium chloride 0.45%. 1000 milliLiter(s) (75 mL/Hr) IV Continuous <Continuous>    MEDICATIONS  (PRN):  dextrose Oral Gel 15 Gram(s) Oral once PRN Blood Glucose LESS THAN 70 milliGRAM(s)/deciliter  glycopyrrolate Injectable 0.4 milliGRAM(s) IV Push four times a day PRN Secretions  metoprolol tartrate Injectable 5 milliGRAM(s) IV Push every 30 minutes PRN HR Sustained over 130  ondansetron Injectable 4 milliGRAM(s) IV Push every 8 hours PRN Nausea and/or Vomiting      Allergies    No Known Allergies          Vital Signs Last 24 Hrs  T(C): 36.6 (29 Jun 2022 09:23), Max: 36.9 (28 Jun 2022 19:55)  T(F): 97.9 (29 Jun 2022 09:23), Max: 98.4 (28 Jun 2022 19:55)  HR: 131 (29 Jun 2022 09:23) (66 - 159)  BP: 114/79 (29 Jun 2022 07:53) (114/79 - 138/72)  BP(mean): --  RR: 18 (29 Jun 2022 07:53) (18 - 18)  SpO2: 98% (29 Jun 2022 07:53) (98% - 100%)    PHYSICAL EXAM:  GENERAL: Comfortable  NECK: Supple  NERVOUS SYSTEM:  Awake, not fully oriented  CHEST/LUNG: Clear bilaterally  HEART: Regular rate; no rub  ABDOMEN: Soft, Nontender, Nondistended; +BS  EXTREMITIES: tr LE edema    LABS:                        11.0   15.55 )-----------( 137      ( 29 Jun 2022 07:52 )             33.9     06-29    139  |  105  |  50.6<H>  ----------------------------<  192<H>  4.9   |  19.0<L>  |  1.20    Ca    10.5<H>      29 Jun 2022 07:52  Phos  2.4     06-29  Mg     2.2     06-29  Albumin, Serum: 3.1 g/dL (06.27.22)        Magnesium, Serum: 2.2 mg/dL (06-29 @ 07:52)  Phosphorus Level, Serum: 2.4 mg/dL (06-29 @ 07:52)      RADIOLOGY & ADDITIONAL TESTS:  < from: US Renal (06.27.22 @ 16:05) >    ACC: [id number] EXAM:  US KIDNEY(S)                          PROCEDURE DATE:  06/27/2022          INTERPRETATION:  CLINICAL INFORMATION: Acute kidney injury. Chronic   kidney disease.    COMPARISON: CT scan 6/2/2022    TECHNIQUE: Sonography of the kidneys and bladder.    FINDINGS:  Right kidney: 9.2 cm. No hydronephrosis or calculi. Right midpole cyst   measures 1.6 x 1.1 x 1.1 cm. Upper pole cyst measures 2.3 x 1.7 x 1.8 cm.    Left kidney: 10.5 cm. No hydronephrosis or calculi. Midpole thinly   septated cyst measures 4.4 x 4.0 x 4.3 cm. Upper pole cyst measures 0.8 x   0.5 x 0.7 cm.    Urinary bladder: Partially decompressed with a [facility] catheter. Debris in   the bladder lumen.    IMPRESSION:  *  Bilateral renal cysts.  *  No hydronephrosis.  *  Echogenic debris in the bladder. Correlate with urinalysis.    < end of copied text >  
[facility] Division of Hospital Medicine    Chief Complaint:    VINCE, Sepsis, UTI, Bacteremia    SUBJECTIVE / OVERNIGHT EVENTS:  Patient arousable today   Denies pain     Patient denies chest pain, SOB, abd pain, N/V, fever, chills, dysuria or any other complaints. All remainder ROS negative.     MEDICATIONS  (STANDING):  dextrose 5%. 1000 milliLiter(s) (50 mL/Hr) IV Continuous <Continuous>  dextrose 5%. 1000 milliLiter(s) (100 mL/Hr) IV Continuous <Continuous>  dextrose 50% Injectable 25 Gram(s) IV Push once  dextrose 50% Injectable 12.5 Gram(s) IV Push once  dextrose 50% Injectable 25 Gram(s) IV Push once  glucagon  Injectable 1 milliGRAM(s) IntraMuscular once  insulin lispro (ADMELOG) corrective regimen sliding scale   SubCutaneous every 6 hours  meropenem  IVPB 500 milliGRAM(s) IV Intermittent every 12 hours  metoprolol tartrate Injectable 5 milliGRAM(s) IV Push every 6 hours  sodium chloride 0.45%. 1000 milliLiter(s) (75 mL/Hr) IV Continuous <Continuous>    MEDICATIONS  (PRN):  dextrose Oral Gel 15 Gram(s) Oral once PRN Blood Glucose LESS THAN 70 milliGRAM(s)/deciliter  glycopyrrolate Injectable 0.4 milliGRAM(s) IV Push four times a day PRN Secretions  metoprolol tartrate Injectable 2.5 milliGRAM(s) IV Push every 30 minutes PRN sustained HR >120bmp  ondansetron Injectable 4 milliGRAM(s) IV Push every 8 hours PRN Nausea and/or Vomiting        I&O's Summary      PHYSICAL EXAM:  Vital Signs Last 24 Hrs  T(C): 36.4 (2022 12:04), Max: 36.9 (2022 00:11)  T(F): 97.5 (2022 12:04), Max: 98.5 (2022 00:11)  HR: 81 (2022 12:04) (80 - 146)  BP: 133/87 (2022 12:04) (114/82 - 148/80)  BP(mean): 1 (2022 08:13) (1 - 1)  RR: 18 (2022 12:04) (18 - 18)  SpO2: 98% (2022 12:04) (92% - 100%)        CONSTITUTIONAL: NAD,elderly chronically ill apeparing  ENMT: Moist oral mucosa, no pharyngeal injection or exudates; normal dentition  RESPIRATORY: Normal respiratory effort; lungs are clear to auscultation bilaterally  CARDIOVASCULAR: Regular rate and rhythm, normal S1 and S2, no murmur/rub/gallop; Peripheral pulses are 2+ bilaterally  ABDOMEN: Nontender to palpation, normoactive bowel sounds, no rebound/guarding;   MUSCLOSKELETAL:  No clubbing or cyanosis of digits; no joint swelling or tenderness to palpation  PSYCH: A+O to person, affect appropriate  NEUROLOGY: CN 2-12 are intact and symmetric; no gross sensory deficits;   SKIN: No rashes; no palpable lesions    LABS:                        10.3   21.33 )-----------( 149      ( 2022 10:40 )             31.3     06-    138  |  103  |  50.3<H>  ----------------------------<  130<H>  4.9   |  20.0<L>  |  1.64<H>    Ca    10.6<H>      2022 06:29  Phos  2.4       Mg     2.1         TPro  6.5<L>  /  Alb  3.1<L>  /  TBili  0.9  /  DBili  x   /  AST  36  /  ALT  44<H>  /  AlkPhos  141<H>            Urinalysis Basic - ( 2022 18:56 )    Color: Yellow / Appearance: Slightly Turbid / S.010 / pH: x  Gluc: x / Ketone: Negative  / Bili: Negative / Urobili: Negative mg/dL   Blood: x / Protein: Negative / Nitrite: Negative   Leuk Esterase: Moderate / RBC: >50 /HPF / WBC >50 /HPF   Sq Epi: x / Non Sq Epi: Moderate / Bacteria: Few        Culture - Blood (collected 2022 11:00)  Source: .Blood Blood-Venous  Gram Stain (2022 06:13):    Growth in aerobic bottle: Gram Negative Rods    Growth in anaerobic bottle: Gram Negative Rods  Preliminary Report (2022 12:43):    Growth in aerobic and anaerobic bottles: Enterobacter cloacae complex    See previous culture [id number]    Culture - Blood (collected 2022 11:00)  Source: .Blood Blood-Peripheral  Gram Stain (2022 06:12):    Growth in aerobic bottle: Gram Negative Rods    Growth in anaerobic bottle: Gram Negative Rods  Preliminary Report (2022 13:17):    Growth in aerobic and anaerobic bottles: Enterobacter cloacae complex    ***Blood Panel PCR results on this specimen are available    approximately 3 hours after the Gram stain result.***    Gram stain, PCR, and/or culture results may not always    correspond due to difference in methodologies.    ************************************************************    This PCR assay was performed by multiplex PCR. This    Assay tests for 66 bacterial and resistance gene targets.    Please refer to the [facility] test directory    at [email or web address] for details.  Organism: Blood Culture PCR (2022 06:37)  Organism: Blood Culture PCR (2022 06:37)      CAPILLARY BLOOD GLUCOSE      POCT Blood Glucose.: 171 mg/dL (2022 12:16)  POCT Blood Glucose.: 158 mg/dL (2022 05:59)  POCT Blood Glucose.: 134 mg/dL (2022 00:09)  POCT Blood Glucose.: 170 mg/dL (2022 17:41)        RADIOLOGY & ADDITIONAL TESTS:  Results Reviewed:   Imaging Personally Reviewed:  Electrocardiogram Personally Reviewed:                                          
NEPHROLOGY INTERVAL HPI/OVERNIGHT EVENTS:    Examined  Elderly frail   No distress noted  No dyspnea laying flat  Kaiser w good UOP  More alert w [place]       MEDICATIONS  (STANDING):  dextrose 5%. 1000 milliLiter(s) (50 mL/Hr) IV Continuous <Continuous>  dextrose 5%. 1000 milliLiter(s) (100 mL/Hr) IV Continuous <Continuous>  dextrose 50% Injectable 25 Gram(s) IV Push once  dextrose 50% Injectable 12.5 Gram(s) IV Push once  dextrose 50% Injectable 25 Gram(s) IV Push once  glucagon  Injectable 1 milliGRAM(s) IntraMuscular once  insulin lispro (ADMELOG) corrective regimen sliding scale   SubCutaneous every 6 hours  meropenem  IVPB 500 milliGRAM(s) IV Intermittent every 12 hours  metoprolol tartrate Injectable 5 milliGRAM(s) IV Push every 6 hours  sodium chloride 0.45%. 1000 milliLiter(s) (75 mL/Hr) IV Continuous <Continuous>    MEDICATIONS  (PRN):  dextrose Oral Gel 15 Gram(s) Oral once PRN Blood Glucose LESS THAN 70 milliGRAM(s)/deciliter  glycopyrrolate Injectable 0.4 milliGRAM(s) IV Push four times a day PRN Secretions  metoprolol tartrate Injectable 2.5 milliGRAM(s) IV Push every 30 minutes PRN sustained HR >120bmp  ondansetron Injectable 4 milliGRAM(s) IV Push every 8 hours PRN Nausea and/or Vomiting      Allergies    No Known Allergies    Intolerances        Vital Signs Last 24 Hrs  T(C): 36.4 (2022 12:04), Max: 36.9 (2022 00:11)  T(F): 97.5 (2022 12:04), Max: 98.5 (2022 00:11)  HR: 81 (2022 12:04) (80 - 146)  BP: 133/87 (2022 12:04) (114/82 - 148/80)  BP(mean): 1 (2022 08:13) (1 - 1)  RR: 18 (2022 12:04) (18 - 18)  SpO2: 98% (2022 12:04) (92% - 100%)  Daily     Daily     PHYSICAL EXAM:  GENERAL: NAD  NECK: Supple  NERVOUS SYSTEM:  Arousable, confused  CHEST/LUNG: Clear to percussion bilaterally  HEART: Regular rate and rhythm  ABDOMEN: Soft, Nontender, Nondistended; +BS  EXTREMITIES: trce edema    LABS:                        10.3   21.33 )-----------( 149      ( 2022 10:40 )             31.3         138  |  103  |  50.3<H>  ----------------------------<  130<H>  4.9   |  20.0<L>  |  1.64<H>    Ca    10.6<H>      2022 06:29  Phos  2.4       Mg     2.1         TPro  6.5<L>  /  Alb  3.1<L>  /  TBili  0.9  /  DBili  x   /  AST  36  /  ALT  44<H>  /  AlkPhos  141<H>        Urinalysis Basic - ( 2022 18:56 )    Color: Yellow / Appearance: Slightly Turbid / S.010 / pH: x  Gluc: x / Ketone: Negative  / Bili: Negative / Urobili: Negative mg/dL   Blood: x / Protein: Negative / Nitrite: Negative   Leuk Esterase: Moderate / RBC: >50 /HPF / WBC >50 /HPF   Sq Epi: x / Non Sq Epi: Moderate / Bacteria: Few      Phosphorus Level, Serum: 2.4 mg/dL ( @ 06:29)  Magnesium, Serum: 2.1 mg/dL ( @ 06:29)          RADIOLOGY & ADDITIONAL TESTS:  
                                           [facility]                                                INFECTIOUS DISEASES  =======================================================                               Lew Johnson MD#  Matt Cody MD*                                     Maria R Low MD*    Ale George MD*            Diplomates American Board of Internal Medicine & Infectious Diseases                  # [place] Office - Appt - Tel  [phone] Fax [phone]                * [place] Office - Appt - Tel [phone] Fax [phone]                                  Hospital Consult line:  [phone]  =======================================================      [facility]-[id number]  JOHNATHON SEVERINO   follow up for: bacteremia  awake and alert  wbc improved  patient seen and examined.       I have personally reviewed the labs and data; pertinent labs and data are listed in this note; please see below.   ===================================================  REVIEW OF SYSTEMS:  limited  =======================================================  Allergies    No Known Allergies    Intolerances    Antibiotics:  meropenem  IVPB 500 milliGRAM(s) IV Intermittent every 12 hours    Other medications:  dextrose 5%. 1000 milliLiter(s) IV Continuous <Continuous>  dextrose 5%. 1000 milliLiter(s) IV Continuous <Continuous>  dextrose 50% Injectable 25 Gram(s) IV Push once  dextrose 50% Injectable 12.5 Gram(s) IV Push once  dextrose 50% Injectable 25 Gram(s) IV Push once  diltiazem    Tablet 60 milliGRAM(s) Oral every 8 hours  glucagon  Injectable 1 milliGRAM(s) IntraMuscular once  insulin lispro (ADMELOG) corrective regimen sliding scale   SubCutaneous every 6 hours  metoprolol tartrate 100 milliGRAM(s) Oral every 12 hours  rivaroxaban 20 milliGRAM(s) Oral daily  sodium chloride 0.225% 1000 milliLiter(s) IV Continuous <Continuous>    ======================================================  Physical Exam:  ============  T(F): 97.9 (30 Jun 2022 11:30), Max: 97.9 (30 Jun 2022 11:30)  HR: 121 (30 Jun 2022 11:30)  BP: 149/91 (30 Jun 2022 11:30)  RR: 19 (30 Jun 2022 11:30)  SpO2: 100% (30 Jun 2022 11:30) (98% - 100%)  temp max in last 48H T(F): , Max: 98.3 (06-29-22 @ 04:27)    General:  No acute distress.  Eye: Pupils are equal, round and reactive to light, Extraocular movements are intact, Normal conjunctiva.  HENT: Normocephalic, Oral mucosa is moist, No pharyngeal erythema, No sinus tenderness.  Neck: Supple, No lymphadenopathy.  Respiratory: Lungs are clear to auscultation, Respirations are non-labored.  Cardiovascular: Normal rate, Regular rhythm,    Gastrointestinal: Soft, Non-tender, Non-distended, Normal bowel sounds.  Genitourinary: No costovertebral angle tenderness.  Lymphatics: No lymphadenopathy neck,   Musculoskeletal: Normal range of motion, Normal strength.  Integumentary: No rash.  Neurologic: awake alert    =======================================================  Labs:                        10.8   14.32 )-----------( 173      ( 30 Jun 2022 08:26 )             34.2     06-30    145  |  110<H>  |  46.0<H>  ----------------------------<  221<H>  4.2   |  20.0<L>  |  1.10    Ca    11.0<H>      30 Jun 2022 08:26  Phos  2.1     06-30  Mg     2.1     06-30        Culture - Blood (collected 06-28-22 @ 12:50)  Source: .Blood Blood    Culture - Blood (collected 06-28-22 @ 12:25)  Source: .Blood Blood  Gram Stain (06-29-22 @ 10:56):    Growth in aerobic bottle: Gram Positive Cocci in Clusters  Final Report (06-30-22 @ 11:52):    Growth in aerobic bottle: Staphylococcus hominis    Coag Negative Staphylococcus    Single set isolate, possible contaminant. Contact    Microbiology if susceptibility testing clinically    indicated.    ***Blood Panel PCR results on this specimen are available    approximately 3 hours after the Gram stain result.***    Gram stain, PCR, and/or culture results may not always    correspond due to difference in methodologies.    ************************************************************    This PCR assay was performed by multiplex PCR. This    Assay tests for 66 bacterial and resistance gene targets.    Please refer to the [facility] test directory    at [email or web address] for details.  Organism: Blood Culture PCR (06-30-22 @ 11:52)  Organism: Blood Culture PCR (06-30-22 @ 11:52)    Sensitivities:      -  Coagulase negative Staphylococcus: Detec      Method Type: PCR    Culture - Urine (collected 06-28-22 @ 08:31)  Source: Clean Catch Clean Catch (Midstream)    Culture - Blood (collected 06-26-22 @ 11:00)  Source: .Blood Blood-Venous  Gram Stain (06-27-22 @ 06:13):    Growth in aerobic bottle: Gram Negative Rods    Growth in anaerobic bottle: Gram Negative Rods  Final Report (06-29-22 @ 13:52):    Growth in aerobic and anaerobic bottles: Enterobacter cloacae complex    See previous culture [id number]    Culture - Blood (collected 06-26-22 @ 11:00)  Source: .Blood Blood-Peripheral  Gram Stain (06-27-22 @ 06:12):    Growth in aerobic bottle: Gram Negative Rods    Growth in anaerobic bottle: Gram Negative Rods  Final Report (06-29-22 @ 13:47):    Growth in aerobic and anaerobic bottles: Enterobacter cloacae complex    ***Blood Panel PCR results on this specimen are available    approximately 3 hours after the Gram stain result.***    Gram stain, PCR, and/or culture results may not always    correspond due to difference in methodologies.    ************************************************************    This PCR assay was performed by multiplex PCR. This    Assay tests for 66 bacterial and resistance gene targets.    Please refer to the [facility] test directory    at [email or web address] for details.  Organism: Blood Culture PCR  Enterobacter cloacae complex (06-29-22 @ 13:47)  Organism: Enterobacter cloacae complex (06-29-22 @ 13:47)    Sensitivities:      -  Amikacin: S <=16      -  Ampicillin: R >16 These ampicillin results predict results for amoxicillin      -  Ampicillin/Sulbactam: R 8/4 Enterobacter, Klebsiella aerogenes, Citrobacter, and Serratia may develop resistance during prolonged therapy (3-4 days)      -  Aztreonam: S <=4      -  Cefazolin: R >16 Enterobacter, Klebsiella aerogenes, Citrobacter, and Serratia may develop resistance during prolonged therapy (3-4 days)      -  Cefepime: S <=2      -  Cefoxitin: R >16      -  Ceftriaxone: S <=1 Enterobacter, Klebsiella aerogenes, Citrobacter, and Serratia may develop resistance during prolonged therapy      -  Ciprofloxacin: S <=0.25      -  Ertapenem: S <=0.5      -  Gentamicin: S <=2      -  Imipenem: S <=1      -  Levofloxacin: S <=0.5      -  Meropenem: S <=1      -  Piperacillin/Tazobactam: S <=8      -  Tobramycin: S <=2      -  Trimethoprim/Sulfamethoxazole: S <=0.5/9.5      Method Type: MASSIMO  Organism: Blood Culture PCR (06-29-22 @ 13:47)    Sensitivities:      -  Enterobacter cloacae complex: Detec      Method Type: PCR        
                                           [facility]                                                INFECTIOUS DISEASES  =======================================================                               Lew Johnson MD#  Matt Cody MD*                                     Maria R Low MD*    Ale George MD*            Diplomates American Board of Internal Medicine & Infectious Diseases                  # [place] Office - Appt - Tel  [phone] Fax [phone]                * [place] Office - Appt - Tel [phone] Fax [phone]                                  Hospital Consult line:  [phone]  =======================================================      [facility]-[id number]  JOHNATHON SEVERINO   follow up for: bacteremia  wbc and cr improving  pt is alert today and passed swallow eval  plan is for pt to return home on home hospice  patient seen and examined.       I have personally reviewed the labs and data; pertinent labs and data are listed in this note; please see below.   ===================================================  REVIEW OF SYSTEMS:  limited    =======================================================  Allergies    No Known Allergies    Intolerances    Antibiotics:  meropenem  IVPB 500 milliGRAM(s) IV Intermittent every 12 hours    Other medications:  dextrose 5%. 1000 milliLiter(s) IV Continuous <Continuous>  dextrose 5%. 1000 milliLiter(s) IV Continuous <Continuous>  dextrose 50% Injectable 25 Gram(s) IV Push once  dextrose 50% Injectable 12.5 Gram(s) IV Push once  dextrose 50% Injectable 25 Gram(s) IV Push once  diltiazem    Tablet 60 milliGRAM(s) Oral every 8 hours  glucagon  Injectable 1 milliGRAM(s) IntraMuscular once  insulin lispro (ADMELOG) corrective regimen sliding scale   SubCutaneous every 6 hours  metoprolol tartrate 100 milliGRAM(s) Oral every 12 hours  rivaroxaban 20 milliGRAM(s) Oral daily  sodium chloride 0.45%. 1000 milliLiter(s) IV Continuous <Continuous>    ======================================================  Physical Exam:  ============  T(F): 97.7 (29 Jun 2022 17:04), Max: 98.3 (29 Jun 2022 04:27)  HR: 98 (29 Jun 2022 20:42)  BP: 128/86 (29 Jun 2022 20:42)  RR: 18 (29 Jun 2022 20:42)  SpO2: 97% (29 Jun 2022 20:42) (97% - 100%)  temp max in last 48H T(F): , Max: 98.5 (06-28-22 @ 00:11)    General:  No acute distress.  Eye: Pupils are equal, round and reactive to light, Extraocular movements are intact, Normal conjunctiva.  HENT: Normocephalic, Oral mucosa is moist, No pharyngeal erythema, No sinus tenderness.  Neck: Supple, No lymphadenopathy.  Respiratory: Lungs are clear to auscultation, Respirations are non-labored.  Cardiovascular: Normal rate, Regular rhythm,    Gastrointestinal: Soft, Non-tender, Non-distended, Normal bowel sounds.  Genitourinary: No costovertebral angle tenderness.  Lymphatics: No lymphadenopathy neck,   Musculoskeletal: Normal range of motion, Normal strength.  Integumentary: No rash.  Neurologic: awake and alert today  =======================================================  Labs:                        11.0   15.55 )-----------( 137      ( 29 Jun 2022 07:52 )             33.9     06-29    139  |  105  |  50.6<H>  ----------------------------<  192<H>  4.9   |  19.0<L>  |  1.20    Ca    10.5<H>      29 Jun 2022 07:52  Phos  2.4     06-29  Mg     2.2     06-29        Culture - Blood (collected 06-28-22 @ 12:50)  Source: .Blood Blood    Culture - Blood (collected 06-28-22 @ 12:25)  Source: .Blood Blood  Gram Stain (06-29-22 @ 10:56):    Growth in aerobic bottle: Gram Positive Cocci in Clusters  Organism: Blood Culture PCR (06-29-22 @ 12:51)  Organism: Blood Culture PCR (06-29-22 @ 12:51)    Sensitivities:      -  Coagulase negative Staphylococcus: Detec      Method Type: PCR    Culture - Urine (collected 06-28-22 @ 08:31)  Source: Clean Catch Clean Catch (Midstream)    Culture - Blood (collected 06-26-22 @ 11:00)  Source: .Blood Blood-Venous  Gram Stain (06-27-22 @ 06:13):    Growth in aerobic bottle: Gram Negative Rods    Growth in anaerobic bottle: Gram Negative Rods  Final Report (06-29-22 @ 13:52):    Growth in aerobic and anaerobic bottles: Enterobacter cloacae complex    See previous culture [id number]    Culture - Blood (collected 06-26-22 @ 11:00)  Source: .Blood Blood-Peripheral  Gram Stain (06-27-22 @ 06:12):    Growth in aerobic bottle: Gram Negative Rods    Growth in anaerobic bottle: Gram Negative Rods  Final Report (06-29-22 @ 13:47):    Growth in aerobic and anaerobic bottles: Enterobacter cloacae complex    ***Blood Panel PCR results on this specimen are available    approximately 3 hours after the Gram stain result.***    Gram stain, PCR, and/or culture results may not always    correspond due to difference in methodologies.    ************************************************************    This PCR assay was performed by multiplex PCR. This    Assay tests for 66 bacterial and resistance gene targets.    Please refer to the [facility] test directory    at [email or web address] for details.  Organism: Blood Culture PCR  Enterobacter cloacae complex (06-29-22 @ 13:47)  Organism: Enterobacter cloacae complex (06-29-22 @ 13:47)    Sensitivities:      -  Amikacin: S <=16      -  Ampicillin: R >16 These ampicillin results predict results for amoxicillin      -  Ampicillin/Sulbactam: R 8/4 Enterobacter, Klebsiella aerogenes, Citrobacter, and Serratia may develop resistance during prolonged therapy (3-4 days)      -  Aztreonam: S <=4      -  Cefazolin: R >16 Enterobacter, Klebsiella aerogenes, Citrobacter, and Serratia may develop resistance during prolonged therapy (3-4 days)      -  Cefepime: S <=2      -  Cefoxitin: R >16      -  Ceftriaxone: S <=1 Enterobacter, Klebsiella aerogenes, Citrobacter, and Serratia may develop resistance during prolonged therapy      -  Ciprofloxacin: S <=0.25      -  Ertapenem: S <=0.5      -  Gentamicin: S <=2      -  Imipenem: S <=1      -  Levofloxacin: S <=0.5      -  Meropenem: S <=1      -  Piperacillin/Tazobactam: S <=8      -  Tobramycin: S <=2      -  Trimethoprim/Sulfamethoxazole: S <=0.5/9.5      Method Type: MASSIMO  Organism: Blood Culture PCR (06-29-22 @ 13:47)    Sensitivities:      -  Enterobacter cloacae complex: Detec      Method Type: PCR      < from: US Renal (06.27.22 @ 16:05) >    IMPRESSION:  *  Bilateral renal cysts.  *  No hydronephrosis.  *  Echogenic debris in the bladder. Correlate with urinalysis.        --- End of Report ---    < end of copied text >    
                                           [facility]                                                INFECTIOUS DISEASES  =======================================================                               Lew Johnson MD#  Matt Cody MD*                                     Maria R Low MD*    Ale George MD*            Diplomates American Board of Internal Medicine & Infectious Diseases                  # [place] Office - Appt - Tel  [phone] Fax [phone]                * [place] Office - Appt - Tel [phone] Fax [phone]                                  Hospital Consult line:  [phone]  =======================================================      [facility]-[id number]  JOHNATHON SEVERINO   follow up for: leukocytosis r/o uti  bcx + enterobacter  patient seen and examined.       I have personally reviewed the labs and data; pertinent labs and data are listed in this note; please see below.   ===================================================  REVIEW OF SYSTEMS:  cannot obtain    =======================================================  Allergies    No Known Allergies    Intolerances    Antibiotics:  meropenem  IVPB 500 milliGRAM(s) IV Intermittent every 12 hours    Other medications:  dextrose 5%. 1000 milliLiter(s) IV Continuous <Continuous>  dextrose 5%. 1000 milliLiter(s) IV Continuous <Continuous>  dextrose 50% Injectable 25 Gram(s) IV Push once  dextrose 50% Injectable 12.5 Gram(s) IV Push once  dextrose 50% Injectable 25 Gram(s) IV Push once  glucagon  Injectable 1 milliGRAM(s) IntraMuscular once  insulin lispro (ADMELOG) corrective regimen sliding scale   SubCutaneous every 6 hours  metoprolol tartrate Injectable 5 milliGRAM(s) IV Push every 6 hours  sodium chloride 0.45%. 1000 milliLiter(s) IV Continuous <Continuous>    ======================================================  Physical Exam:  ============  T(F): 98 (27 Jun 2022 20:05), Max: 98.6 (26 Jun 2022 23:53)  HR: 80 (27 Jun 2022 20:05)  BP: 114/82 (27 Jun 2022 20:05)  RR: 18 (27 Jun 2022 20:05)  SpO2: 92% (27 Jun 2022 20:05) (92% - 100%)  temp max in last 48H T(F): , Max: 98.6 (06-26-22 @ 23:53)    General:  No acute distress.  Eye: Pupils are equal, round and reactive to light, Extraocular movements are intact, Normal conjunctiva.  HENT: Normocephalic, Oral mucosa is moist, No pharyngeal erythema, No sinus tenderness.  Neck: Supple, No lymphadenopathy.  Respiratory: Lungs are clear to auscultation, Respirations are non-labored.  Cardiovascular: Normal rate, Regular rhythm,    Gastrointestinal: Soft, Non-tender, Non-distended, Normal bowel sounds.  Genitourinary: No costovertebral angle tenderness.  Lymphatics: No lymphadenopathy neck,   Musculoskeletal: Normal range of motion, Normal strength.  Integumentary: No rash.  Neurologic: awake drowsy  =======================================================  Labs:                        9.7    24.37 )-----------( 135      ( 27 Jun 2022 08:54 )             29.1     06-27    139  |  102  |  50.0<H>  ----------------------------<  156<H>  5.2   |  25.0  |  2.47<H>    Ca    10.9<H>      27 Jun 2022 08:54  Phos  2.8     06-27  Mg     1.9     06-27    TPro  6.5<L>  /  Alb  3.1<L>  /  TBili  0.9  /  DBili  x   /  AST  36  /  ALT  44<H>  /  AlkPhos  141<H>  06-27      Culture - Blood (collected 06-26-22 @ 11:00)  Source: .Blood Blood-Venous  Gram Stain (06-27-22 @ 06:13):    Growth in aerobic bottle: Gram Negative Rods    Growth in anaerobic bottle: Gram Negative Rods    Culture - Blood (collected 06-26-22 @ 11:00)  Source: .Blood Blood-Peripheral  Gram Stain (06-27-22 @ 06:12):    Growth in aerobic bottle: Gram Negative Rods    Growth in anaerobic bottle: Gram Negative Rods  Organism: Blood Culture PCR (06-27-22 @ 06:37)  Organism: Blood Culture PCR (06-27-22 @ 06:37)    Sensitivities:      -  Enterobacter cloacae complex: Detec      Method Type: PCR        
[facility] Division of Hospital Medicine    Chief Complaint:    AMS, lethargy    SUBJECTIVE / OVERNIGHT EVENTS:  Admitted ot medicine yesterday  Cultures returned with GNR x 2  Patient is not alert at this time. Opens eyes, not conversational  Unable to obtain ROS sec to mentation    MEDICATIONS  (STANDING):  dextrose 5%. 1000 milliLiter(s) (50 mL/Hr) IV Continuous <Continuous>  dextrose 5%. 1000 milliLiter(s) (100 mL/Hr) IV Continuous <Continuous>  dextrose 50% Injectable 25 Gram(s) IV Push once  dextrose 50% Injectable 12.5 Gram(s) IV Push once  dextrose 50% Injectable 25 Gram(s) IV Push once  glucagon  Injectable 1 milliGRAM(s) IntraMuscular once  insulin lispro (ADMELOG) corrective regimen sliding scale   SubCutaneous every 6 hours  meropenem  IVPB 500 milliGRAM(s) IV Intermittent every 12 hours  metoprolol tartrate Injectable 5 milliGRAM(s) IV Push every 6 hours  sodium chloride 0.45% 1000 milliLiter(s) (70 mL/Hr) IV Continuous <Continuous>    MEDICATIONS  (PRN):  dextrose Oral Gel 15 Gram(s) Oral once PRN Blood Glucose LESS THAN 70 milliGRAM(s)/deciliter  glycopyrrolate Injectable 0.4 milliGRAM(s) IV Push four times a day PRN Secretions  ondansetron Injectable 4 milliGRAM(s) IV Push every 8 hours PRN Nausea and/or Vomiting        I&O's Summary    2022 07:01  -  2022 07:00  --------------------------------------------------------  IN: 0 mL / OUT: 1300 mL / NET: -1300 mL        PHYSICAL EXAM:  Vital Signs Last 24 Hrs  T(C): 36.3 (2022 08:54), Max: 37 (2022 23:53)  T(F): 97.4 (2022 08:54), Max: 98.6 (2022 23:53)  HR: 100 (2022 08:54) (100 - 137)  BP: 162/96 (2022 08:54) (108/77 - 162/96)  BP(mean): --  RR: 10 (2022 08:54) (10 - 22)  SpO2: 100% (2022 08:54) (96% - 100%)        CONSTITUTIONAL: NAD, elderly, altered  ENMT: Moist oral mucosa, no pharyngeal injection or exudates; normal dentition  RESPIRATORY: Normal respiratory effort; lungs are clear to auscultation bilaterally  CARDIOVASCULAR: Regular rate and irreg rhythm, normal S1 and S2, no murmur/rub/gallop; Peripheral pulses are 2+ bilaterally  ABDOMEN: Nontender to palpation, normoactive bowel sounds, no rebound/guarding;   MUSCLOSKELETAL:  No clubbing or cyanosis of digits; no joint swelling or tenderness to palpation  PSYCH: A+O to person, place, and time; affect appropriate  NEUROLOGY: CN 2-12 are intact and symmetric; no gross sensory deficits;   SKIN: No rashes; no palpable lesions    LABS:                        9.7    24.37 )-----------( 135      ( 2022 08:54 )             29.1     0627    139  |  102  |  50.0<H>  ----------------------------<  156<H>  5.2   |  25.0  |  2.47<H>    Ca    10.9<H>      2022 08:54  Phos  2.8       Mg     1.9     27    TPro  6.5<L>  /  Alb  3.1<L>  /  TBili  0.9  /  DBili  x   /  AST  36  /  ALT  44<H>  /  AlkPhos  141<H>  27    PT/INR - ( 2022 09:21 )   PT: 24.0 sec;   INR: 2.05 ratio         PTT - ( 2022 09:21 )  PTT:31.3 sec  CARDIAC MARKERS ( 2022 05:43 )  x     / x     / 912 U/L / x     / 5.0 ng/mL      Urinalysis Basic - ( 2022 18:56 )    Color: Yellow / Appearance: Slightly Turbid / S.010 / pH: x  Gluc: x / Ketone: Negative  / Bili: Negative / Urobili: Negative mg/dL   Blood: x / Protein: Negative / Nitrite: Negative   Leuk Esterase: Moderate / RBC: >50 /HPF / WBC >50 /HPF   Sq Epi: x / Non Sq Epi: Moderate / Bacteria: Few        Culture - Blood (collected 2022 11:00)  Source: .Blood Blood-Venous  Gram Stain (2022 06:13):    Growth in aerobic bottle: Gram Negative Rods    Growth in anaerobic bottle: Gram Negative Rods  Preliminary Report (2022 06:13):    Growth in aerobic bottle: Gram Negative Rods    Growth in anaerobic bottle: Gram Negative Rods    Culture - Blood (collected 2022 11:00)  Source: .Blood Blood-Peripheral  Gram Stain (2022 06:12):    Growth in aerobic bottle: Gram Negative Rods    Growth in anaerobic bottle: Gram Negative Rods  Preliminary Report (2022 06:12):    Growth in aerobic bottle: Gram Negative Rods    Growth in anaerobic bottle: Gram Negative Rods    ***Blood Panel PCR results on this specimen are available    approximately 3 hours after the Gram stain result.***    Gram stain, PCR, and/or culture results may not always    correspond due to difference in methodologies.    ************************************************************    This PCR assay was performed by multiplex PCR. This    Assay tests for 66 bacterial and resistance gene targets.    Please refer to the [facility] test directory    at [email or web address] for details.  Organism: Blood Culture PCR (2022 06:37)  Organism: Blood Culture PCR (2022 06:37)      CAPILLARY BLOOD GLUCOSE      POCT Blood Glucose.: 146 mg/dL (2022 05:54)  POCT Blood Glucose.: 125 mg/dL (2022 00:22)  POCT Blood Glucose.: 158 mg/dL (2022 21:06)  POCT Blood Glucose.: 144 mg/dL (2022 18:46)  POCT Blood Glucose.: 146 mg/dL (2022 11:32)        RADIOLOGY & ADDITIONAL TESTS:  Results Reviewed:   Imaging Personally Reviewed:  Electrocardiogram Personally Reviewed:                                          
NEPHROLOGY INTERVAL HPI/OVERNIGHT EVENTS:    Examined  Elderly frail contracted  No distress noted  No dyspnea laying flat  Confused dementia?    MEDICATIONS  (STANDING):  dextrose 5%. 1000 milliLiter(s) (50 mL/Hr) IV Continuous <Continuous>  dextrose 5%. 1000 milliLiter(s) (100 mL/Hr) IV Continuous <Continuous>  dextrose 50% Injectable 25 Gram(s) IV Push once  dextrose 50% Injectable 12.5 Gram(s) IV Push once  dextrose 50% Injectable 25 Gram(s) IV Push once  glucagon  Injectable 1 milliGRAM(s) IntraMuscular once  insulin lispro (ADMELOG) corrective regimen sliding scale   SubCutaneous every 6 hours  meropenem  IVPB 500 milliGRAM(s) IV Intermittent every 12 hours  metoprolol tartrate Injectable 5 milliGRAM(s) IV Push every 6 hours  sodium chloride 0.45% 1000 milliLiter(s) (70 mL/Hr) IV Continuous <Continuous>    MEDICATIONS  (PRN):  dextrose Oral Gel 15 Gram(s) Oral once PRN Blood Glucose LESS THAN 70 milliGRAM(s)/deciliter  glycopyrrolate Injectable 0.4 milliGRAM(s) IV Push four times a day PRN Secretions  ondansetron Injectable 4 milliGRAM(s) IV Push every 8 hours PRN Nausea and/or Vomiting      Allergies    No Known Allergies    Intolerances        Vital Signs Last 24 Hrs  T(C): 36.4 (2022 11:22), Max: 37 (2022 23:53)  T(F): 97.5 (2022 11:22), Max: 98.6 (2022 23:53)  HR: 120 (2022 11:22) (100 - 137)  BP: 132/70 (2022 11:22) (120/76 - 162/96)  BP(mean): --  RR: 18 (2022 11:22) (10 - 22)  SpO2: 95% (2022 11:22) (95% - 100%)  Daily     Daily     PHYSICAL EXAM:  GENERAL: appears chronically ill  NECK: Supple  NERVOUS SYSTEM:  Arousable, confused  CHEST/LUNG: Clear to percussion bilaterally  HEART: Regular rate and rhythm  ABDOMEN: Soft, Nontender, Nondistended; +BS  EXTREMITIES: trce edema    LABS:                        9.7    24.37 )-----------( 135      ( 2022 08:54 )             29.1         139  |  102  |  50.0<H>  ----------------------------<  156<H>  5.2   |  25.0  |  2.47<H>    Ca    10.9<H>      2022 08:54  Phos  2.8       Mg     1.9         TPro  6.5<L>  /  Alb  3.1<L>  /  TBili  0.9  /  DBili  x   /  AST  36  /  ALT  44<H>  /  AlkPhos  141<H>      PT/INR - ( 2022 09:21 )   PT: 24.0 sec;   INR: 2.05 ratio         PTT - ( 2022 09:21 )  PTT:31.3 sec  Urinalysis Basic - ( 2022 18:56 )    Color: Yellow / Appearance: Slightly Turbid / S.010 / pH: x  Gluc: x / Ketone: Negative  / Bili: Negative / Urobili: Negative mg/dL   Blood: x / Protein: Negative / Nitrite: Negative   Leuk Esterase: Moderate / RBC: >50 /HPF / WBC >50 /HPF   Sq Epi: x / Non Sq Epi: Moderate / Bacteria: Few      Magnesium, Serum: 1.9 mg/dL ( @ 08:54)  Phosphorus Level, Serum: 2.8 mg/dL ( @ 08:54)          RADIOLOGY & ADDITIONAL TESTS:  
NEPHROLOGY INTERVAL HPI/OVERNIGHT EVENTS:    No new events.    MEDICATIONS  (STANDING):  dextrose 5%. 1000 milliLiter(s) (50 mL/Hr) IV Continuous <Continuous>  dextrose 5%. 1000 milliLiter(s) (100 mL/Hr) IV Continuous <Continuous>  dextrose 50% Injectable 25 Gram(s) IV Push once  dextrose 50% Injectable 12.5 Gram(s) IV Push once  dextrose 50% Injectable 25 Gram(s) IV Push once  diltiazem    Tablet 60 milliGRAM(s) Oral every 8 hours  glucagon  Injectable 1 milliGRAM(s) IntraMuscular once  insulin lispro (ADMELOG) corrective regimen sliding scale   SubCutaneous every 6 hours  meropenem  IVPB 500 milliGRAM(s) IV Intermittent every 12 hours  metoprolol tartrate 100 milliGRAM(s) Oral every 12 hours  rivaroxaban 20 milliGRAM(s) Oral daily  sodium chloride 0.45%. 1000 milliLiter(s) (75 mL/Hr) IV Continuous <Continuous>    MEDICATIONS  (PRN):  dextrose Oral Gel 15 Gram(s) Oral once PRN Blood Glucose LESS THAN 70 milliGRAM(s)/deciliter  glycopyrrolate Injectable 0.4 milliGRAM(s) IV Push four times a day PRN Secretions  metoprolol tartrate Injectable 5 milliGRAM(s) IV Push every 30 minutes PRN HR Sustained over 130  ondansetron Injectable 4 milliGRAM(s) IV Push every 8 hours PRN Nausea and/or Vomiting      Allergies    No Known Allergies        Vital Signs Last 24 Hrs  T(C): 36.5 (30 Jun 2022 04:42), Max: 36.6 (29 Jun 2022 09:23)  T(F): 97.7 (30 Jun 2022 04:42), Max: 97.9 (29 Jun 2022 09:23)  HR: 140 (30 Jun 2022 04:42) (98 - 143)  BP: 136/96 (30 Jun 2022 04:42) (122/88 - 148/54)  BP(mean): --  RR: 18 (30 Jun 2022 04:42) (18 - 18)  SpO2: 99% (30 Jun 2022 04:42) (97% - 99%)      PHYSICAL EXAM:    GENERAL: appears  chronically ill in bed  HEAD: wnl   EYES:   ENMT:   NECK: veins  not  full  NERVOUS SYSTEM:  mentation  same  CHEST/LUNG: nasal 02, no wheezes  HEART: no rub noted  ABDOMEN: NT  EXTREMITIES:  muscle wasting, stasis  changes lower les, scar left lower  LYMPH:   SKIN: no rash  : [facility]  with clear  urine.    LABS:                        11.0   15.55 )-----------( 137      ( 29 Jun 2022 07:52 )             33.9     06-29    139  |  105  |  50.6<H>  ----------------------------<  192<H>  4.9   |  19.0<L>  |  1.20    Ca    10.5<H>      29 Jun 2022 07:52  Phos  2.4     06-29  Mg     2.2     06-29                  RADIOLOGY & ADDITIONAL TESTS:

## 2022-06-30 NOTE — DISCHARGE NOTE PROVIDER - NSDCMRMEDTOKEN_GEN_ALL_CORE_FT
calcium acetate 667 mg oral tablet: 3 tab(s) orally 3 times a day (with meals)  ciprofloxacin 500 mg oral tablet: 1 tab(s) orally 2 times a day   dilTIAZem 60 mg oral tablet: 1 tab(s) orally every 8 hours  dorzolamide-timolol 2%-0.5% preservative-free ophthalmic solution: 1 drop(s) to each affected eye 2 times a day  insulin glargine 100 units/mL subcutaneous solution: 5 unit(s) subcutaneous once a day (at bedtime)   metoprolol tartrate 100 mg oral tablet: 1 tab(s) orally every 12 hours  pantoprazole 20 mg oral delayed release tablet: 1 tab(s) orally once a day  rivaroxaban 20 mg oral tablet: 1 tab(s) orally once a day  tamsulosin 0.4 mg oral capsule: 1 cap(s) orally once a day (at bedtime)

## 2022-06-30 NOTE — PROGRESS NOTE ADULT - ASSESSMENT
89 y/o M w h/o DM, HTN, A. fib on Xarelto who presents from home with lethargy.   Of note, patient was recently hospitalized for AMS and his hospital c/b renal failure    VINCE on CKD suspect stage III  Possible progression of dz h/o uncontrolled DM A1C 10 in May  Serum Cr 2.8--> 2.4 improved  Renal US ordered r/o obstructive etiology- pending  Holding diuretics  Will give trial of IVF will cont one more day    HyperKalemia Ca2+ insulin given will add Lokelma 10 g x 1  If cannot take PO will give Lasix 40mg IV x1    HypoNatremia better w IVF    MA likely 2/2 VINCE - better    Repeat labs this 
A) Sepsis, CRF with BPH - conley - stable. Hypercalcemia - ? etiology.    P) IV meds, PTH, Vit. D levels.
CKD(III): VINCE improved  Renal sono: no hydronephrosis  - avoid potential nephrotoxins  - cont to hold diuretics  - IVF as needed  - follow labs    Hypercalcemia:  - check iPTH and Vit D  
Patient is a 89M w/ hx of DM, HTN, A. fib on Xarelto who presents from home with lethargy found to be in A. fib w/ RVR likely 2/2 infection. Now admitted for further workup and management. DNR/DNI.    Sepsis Present on Admission -  Leukocytosis, tachycardia, abnormal UA  UTI - Suspect Pyelonephritis given bacteremia  GNR Bacteremia  s/p Zosyn  Cont Fernanda  Cultures res to ampicillin and cefaz. Sens to CTX  Switch to IV CTX.  ID to determine DOT   Pending repeat blood cultures     Acute renal failure.   Likely in the setting of pre-renal 2/2 infection. Patient p/w ARF and hyperK  Potassium normalised  nephro c/s   No obstruction  Indwelling Kaiser replaced in ED    Acute Toxic metabolic encephalopathy in setting of infection on baseline dementia  - Found to have leukocytosis 2/2 infectious etiology.  Treat infection as above    Atrial fibrillation with RVR.   Restart xarelto as hematuria cleared  lopressor 5mg Q6H w/ hold parameters  Restart home dilt/metop as patient tolerating PO    Chronic Urinary retention.   Patient with chronic Kaiser. Kaiser exchanged in ED.    T2DM (type 2 diabetes mellitus).   NPO for now given mental status   MIKE      DVT ppx- Hold Xarelto given hematuria  Diet: Pureed/mod thick    CODE STATUS- DNR/DNI    Dispo: Home hospice with abx  Pending DOT of Abx per ID    HCP (Lisset) on 6/26.  
 89 y/o M w h/o DM, HTN, A. fib on Xarelto who presents from home with lethargy.   Of note, patient was recently hospitalized for AMS and his hospital c/b renal failure    VINCE on CKD suspect stage III  Possible progression of dz h/o uncontrolled DM A1C 10 in May  Serum Cr 2.8--> 2.4 --> 1.6 improved  Renal US noted no hydronephrosis  Holding diuretics  Will cont gentle IVF    HyperKalemia better    HypoNatremia better w IVF    MA likely 2/2 VINCE - better    Repeat labs this   
 89M w/ hx of DM, HTN, A. fib on Xarelto who presents from home with lethargy. Of note, patient was recently hospitalized for AMS and his hospital stay c/b renal failure. Patient is lethargic. History obtained from patient's daughter, Lisset. Per daughter, she had noticed hematuria 1 day pta. In addition, he was confused and had low FS due to decreased PO intake. No f/c/h/d/n/v. Patient was subsequently discharged home w/ home hospice.     In the ED, his vital signs were notable for tachy 117, tachypnea and BP- 80s/50s. Labs notable for leukocytosis, hyperK- 6.2, ARF, and transaminitis.      - bcx + enterobacter  - repeat bcx x 2  - Ua +  - check UCX  - st a/p if aligned with goc  - CXR clear  - c/w meropenem  - dc zosyn  - Trend Fever  - Trend Leukocytosis  - trend renal function  - GOC    d/w attending, pharmacy  Will Follow    
 89M w/ hx of DM, HTN, A. fib on Xarelto who presents from home with lethargy. Of note, patient was recently hospitalized for AMS and his hospital stay c/b renal failure. Patient is lethargic. History obtained from patient's daughter, Lisset. Per daughter, she had noticed hematuria 1 day pta. In addition, he was confused and had low FS due to decreased PO intake. No f/c/h/d/n/v. Patient was subsequently discharged home w/ home hospice.     In the ED, his vital signs were notable for tachy 117, tachypnea and BP- 80s/50s. Labs notable for leukocytosis, hyperK- 6.2, ARF, and transaminitis.      - bcx + enterobacter  - repeat bcx x 2 Cons, likely contaminant. repeat BCX. if remains positive will need further w/u if aligned with GOC  - Ua +  - Ucx + gp organisms  - c/w meropenem-if renal function continues to improve can consider oral quinolone or bactrim to complete course. the overall goal here is to have patient return to home with hospice  - USG renal echogenic debris in bladder, b/l renal cysts  - Trend Fever  - Trend Leukocytosis  - trend renal function  - GOC-high risk for recurrent infection and aspiration    d/w attending, pharmacy  Will Follow    
Patient is a 89M w/ hx of DM, HTN, A. fib on Xarelto who presents from home with lethargy found to be in A. fib w/ RVR likely 2/2 infection. Now admitted for further workup and management. DNR/DNI.    Sepsis Present on Admission -  Leukocytosis, tachycardia, abnormal UA  UTI - Suspect Pyelonephritis given bacteremia  GNR Bacteremia  s/p Zosyn  Broaden to Fernanda given sepsis and bacteremia.   F/u Culture data  Repeat Bcxs  Appreciate ID recs.    Acute renal failure.   Likely in the setting of pre-renal 2/2 infection. Patient p/w ARF and hyperK  Potassium normalised  nephro c/s   F/u US renal to r/o obstruction  Kaiser placed  started maintenance IVF.    Acute Toxic metabolic encephalopathy in setting of infection on baseline dementia  - Found to have leukocytosis 2/2 infectious etiology.  Treat infection as above    Atrial fibrillation with RVR.   Will hold AC given hematuria for now  lopressor 5mg Q6H w/ hold parameters    Chronic Urinary retention.   Patient with chronic Kaiser. Kaiser exchanged in ED.    T2DM (type 2 diabetes mellitus).   NPO for now given mental status   MIKE      DVT ppx- Hold Xarelto given hematuria  NPO due to lethargy. C/w IVF    - Dispo- Pending clinical improvement. Trial of abx per family. Potential Hospice if no improvement.  Hospice c/s placed on admission  CODE STATUS- DNR/DNI    HCP (Lisset) on 6/26.    
Patient is a 89M w/ hx of DM, HTN, A. fib on Xarelto who presents from home with lethargy found to be in A. fib w/ RVR likely 2/2 infection. Now admitted for further workup and management. DNR/DNI.    Sepsis Present on Admission -  Leukocytosis, tachycardia, abnormal UA  UTI - Suspect Pyelonephritis given bacteremia  GNR Bacteremia  s/p Zosyn  Cont Fernanda  Cultures pending sens  Appreciate ID recs.    Acute renal failure.   Likely in the setting of pre-renal 2/2 infection. Patient p/w ARF and hyperK  Potassium normalised  nephro c/s   No obstruction  Indwelling Kaiser replaced in ED    Acute Toxic metabolic encephalopathy in setting of infection on baseline dementia  - Found to have leukocytosis 2/2 infectious etiology.  Treat infection as above    Atrial fibrillation with RVR.   Will hold AC given hematuria for now  lopressor 5mg Q6H w/ hold parameters    Chronic Urinary retention.   Patient with chronic Kaiser. Kaiser exchanged in ED.    T2DM (type 2 diabetes mellitus).   NPO for now given mental status   MIKE      DVT ppx- Hold Xarelto given hematuria  Diet: NPO pending slp re-eval    CODE STATUS- DNR/DNI    Dispo: Home hospice with abx  Pending culture sens. and determination of DOT    HCP (Lisset) on 6/26.      
 89M w/ hx of DM, HTN, A. fib on Xarelto who presents from home with lethargy. Of note, patient was recently hospitalized for AMS and his hospital stay c/b renal failure. Patient is lethargic. History obtained from patient's daughter, Lisset. Per daughter, she had noticed hematuria 1 day pta. In addition, he was confused and had low FS due to decreased PO intake. No f/c/h/d/n/v. Patient was subsequently discharged home w/ home hospice.     In the ED, his vital signs were notable for tachy 117, tachypnea and BP- 80s/50s. Labs notable for leukocytosis, hyperK- 6.2, ARF, and transaminitis.      - bcx + enterobacter  - repeat bcx x 2 Cons, likely contaminant. repeated BCX. if remains positive will need further w/u if aligned with GOC  - Ua +  - Ucx + gp organisms  - c/w meropenem  - can complete abx with oral cipro 500mg q12h through 7/12/22, quinolones have good oral availability and can be crushed if needed. the overall goal here is to have patient return to home with hospice  - USG renal echogenic debris in bladder, b/l renal cysts  - Trend Fever  - Trend Leukocytosis  - trend renal function  - GOC-high risk for recurrent infection and aspiration    d/w attending, pharmacy, hospice RN

## 2022-06-30 NOTE — DISCHARGE NOTE NURSING/CASE MANAGEMENT/SOCIAL WORK - NSDCVIVACCINE_GEN_ALL_CORE_FT
Please use warm compresses 10-15 minutes, 4-5 times a day to help increase wound drainage and decrease swelling, and take your antibiotic medication as prescribed.     Please return to the Emergency Department for any new or worsening problems including: worsening of your wound (swelling, redness, or drainage), increasing redness or redness extending further up your body, or temperature of greater than 100.4F.    Please follow up with your Primary Care Doctor or Return to the ED in 2-3 days for a wound check, or sooner if you wound gets worse. Your packing needs to be removed in 2 - 3 days.   
No Vaccines Administered.

## 2022-06-30 NOTE — DISCHARGE NOTE PROVIDER - HOSPITAL COURSE
Patient is a 89M w/ hx of DM, HTN, A. fib on Xarelto who presents from home with lethargy found to be with Sepsis present on admission secondary to urinary infection. in A. fib w/ RVR likely 2/2 infection.     Sepsis Present on Admission -  Leukocytosis, tachycardia, abnormal UA  UTI - Suspect Pyelonephritis given bacteremia  GNR Bacteremia  s/p Zosyn  s/p Fernanda  Cultures res to ampicillin and cefaz. Sens to CTX  ID consulted  Patient to continue Ciprofloxacin    Acute renal failure.   Likely in the setting of pre-renal 2/2 infection. Patient p/w ARF and hyperK  Potassium normalised  nephro c/s   No obstruction  Indwelling Kaiser replaced in ED    Acute Toxic metabolic encephalopathy in setting of infection on baseline dementia  - Found to have leukocytosis 2/2 infectious etiology.  Treat infection as above    Atrial fibrillation with RVR.   Restart xarelto as hematuria cleared  lopressor 5mg Q6H w/ hold parameters  Restart home dilt/metop as patient tolerating PO    Chronic Urinary retention.   Patient with chronic Kaiser. Kaiser exchanged in ED.    DVT ppx- Hold Xarelto given hematuria  Diet: Pureed/mod thick per SLP    CODE STATUS- DNR/DNI    Dispo: Home hospice with abx

## 2022-07-01 LAB
CULTURE RESULTS: SIGNIFICANT CHANGE UP
CULTURE RESULTS: SIGNIFICANT CHANGE UP
SPECIMEN SOURCE: SIGNIFICANT CHANGE UP

## 2022-07-03 LAB
CULTURE RESULTS: SIGNIFICANT CHANGE UP
SPECIMEN SOURCE: SIGNIFICANT CHANGE UP

## 2022-07-05 LAB
CULTURE RESULTS: SIGNIFICANT CHANGE UP
SPECIMEN SOURCE: SIGNIFICANT CHANGE UP

## 2022-07-20 ENCOUNTER — EMERGENCY (EMERGENCY)
Facility: HOSPITAL | Age: 87
LOS: 1 days | Discharge: HOME | End: 2022-07-20
Attending: EMERGENCY MEDICINE
Payer: MEDICAID

## 2022-07-20 VITALS
DIASTOLIC BLOOD PRESSURE: 68 MMHG | SYSTOLIC BLOOD PRESSURE: 117 MMHG | HEIGHT: 68 IN | OXYGEN SATURATION: 98 % | RESPIRATION RATE: 18 BRPM | TEMPERATURE: 98 F | HEART RATE: 120 BPM

## 2022-07-20 PROCEDURE — 99283 EMERGENCY DEPT VISIT LOW MDM: CPT

## 2022-07-20 PROCEDURE — 51702 INSERT TEMP BLADDER CATH: CPT

## 2022-07-20 NOTE — ED PROVIDER NOTE - OBJECTIVE STATEMENT
89 y/o male with PMHx of DM, Afib, neuropathy and HTN presents to ED for Kaiser complications. Pt states his Kaiser clotted, his home nurse at hospice tried to irrigate, but was not successful. Pt noted blood clots in Kaiser tube. No other complaints.

## 2022-07-20 NOTE — ED ADULT NURSE REASSESSMENT NOTE - NS ED NURSE REASSESS COMMENT FT1
conley catheter changed by MD. Conley now patent draining tea-colored urine. 1600ml output so far. Nursing will continue to monitor.

## 2022-07-20 NOTE — ED PROVIDER NOTE - PATIENT PORTAL LINK FT
You can access the FollowMyHealth Patient Portal offered by [facility] by registering at the following website: [email or web address]. By joining [email or web address]’s FollowMyHealth portal, you will also be able to view your health information using other applications (apps) compatible with our system.

## 2022-07-20 NOTE — ED ADULT NURSE NOTE - OBJECTIVE STATEMENT
Pt was brought to ED via EMS accompanied by family member d/t hematuria. Pt arrived in ED with conley catheter in place and hematuria noted. As per family member conley catheter was placed by home care nurse yesterday but has not been draining urine since. Pt's abdomen is slightly distended. Pt does not appear to be in any discomfort. Pt is A&Ox1 to self only. MD currently at bedside. Nursing will continue to monitor.

## 2022-07-20 NOTE — ED ADULT NURSE NOTE - NSFALLRSKINDICTYPE_ED_ALL_ED
Chief Complaint   Patient presents with     Blood Draw     Labs Drawn      Labs drawn peripherally via vascular nurse with ultrasound.     Lauren Schoen, RN     Altered Elimination/Impaired Gait

## 2022-08-11 PROCEDURE — 85730 THROMBOPLASTIN TIME PARTIAL: CPT

## 2022-08-11 PROCEDURE — 87077 CULTURE AEROBIC IDENTIFY: CPT

## 2022-08-11 PROCEDURE — 82310 ASSAY OF CALCIUM: CPT

## 2022-08-11 PROCEDURE — U0003: CPT

## 2022-08-11 PROCEDURE — 96375 TX/PRO/DX INJ NEW DRUG ADDON: CPT

## 2022-08-11 PROCEDURE — 80048 BASIC METABOLIC PNL TOTAL CA: CPT

## 2022-08-11 PROCEDURE — 82550 ASSAY OF CK (CPK): CPT

## 2022-08-11 PROCEDURE — 81001 URINALYSIS AUTO W/SCOPE: CPT

## 2022-08-11 PROCEDURE — 82553 CREATINE MB FRACTION: CPT

## 2022-08-11 PROCEDURE — 87150 DNA/RNA AMPLIFIED PROBE: CPT

## 2022-08-11 PROCEDURE — 83970 ASSAY OF PARATHORMONE: CPT

## 2022-08-11 PROCEDURE — 94640 AIRWAY INHALATION TREATMENT: CPT

## 2022-08-11 PROCEDURE — 71045 X-RAY EXAM CHEST 1 VIEW: CPT

## 2022-08-11 PROCEDURE — U0005: CPT

## 2022-08-11 PROCEDURE — 93005 ELECTROCARDIOGRAM TRACING: CPT

## 2022-08-11 PROCEDURE — 84100 ASSAY OF PHOSPHORUS: CPT

## 2022-08-11 PROCEDURE — 85025 COMPLETE CBC W/AUTO DIFF WBC: CPT

## 2022-08-11 PROCEDURE — 82652 VIT D 1 25-DIHYDROXY: CPT

## 2022-08-11 PROCEDURE — 82306 VITAMIN D 25 HYDROXY: CPT

## 2022-08-11 PROCEDURE — 83735 ASSAY OF MAGNESIUM: CPT

## 2022-08-11 PROCEDURE — 51702 INSERT TEMP BLADDER CATH: CPT

## 2022-08-11 PROCEDURE — 87040 BLOOD CULTURE FOR BACTERIA: CPT

## 2022-08-11 PROCEDURE — 87186 SC STD MICRODIL/AGAR DIL: CPT

## 2022-08-11 PROCEDURE — 36415 COLL VENOUS BLD VENIPUNCTURE: CPT

## 2022-08-11 PROCEDURE — 99285 EMERGENCY DEPT VISIT HI MDM: CPT

## 2022-08-11 PROCEDURE — 70450 CT HEAD/BRAIN W/O DYE: CPT

## 2022-08-11 PROCEDURE — 85610 PROTHROMBIN TIME: CPT

## 2022-08-11 PROCEDURE — 87086 URINE CULTURE/COLONY COUNT: CPT

## 2022-08-11 PROCEDURE — 80053 COMPREHEN METABOLIC PANEL: CPT

## 2022-08-11 PROCEDURE — 82962 GLUCOSE BLOOD TEST: CPT

## 2022-08-11 PROCEDURE — 92610 EVALUATE SWALLOWING FUNCTION: CPT

## 2022-08-11 PROCEDURE — 96374 THER/PROPH/DIAG INJ IV PUSH: CPT | Mod: XU

## 2022-08-11 PROCEDURE — 83605 ASSAY OF LACTIC ACID: CPT

## 2022-08-11 PROCEDURE — 76775 US EXAM ABDO BACK WALL LIM: CPT

## 2022-08-19 ENCOUNTER — EMERGENCY (EMERGENCY)
Facility: HOSPITAL | Age: 87
LOS: 1 days | Discharge: HOME | End: 2022-08-19
Attending: EMERGENCY MEDICINE
Payer: MEDICAID

## 2022-08-19 VITALS
HEART RATE: 137 BPM | OXYGEN SATURATION: 100 % | TEMPERATURE: 98 F | DIASTOLIC BLOOD PRESSURE: 70 MMHG | RESPIRATION RATE: 18 BRPM | SYSTOLIC BLOOD PRESSURE: 108 MMHG

## 2022-08-19 VITALS
HEART RATE: 83 BPM | SYSTOLIC BLOOD PRESSURE: 137 MMHG | HEIGHT: 68 IN | OXYGEN SATURATION: 99 % | DIASTOLIC BLOOD PRESSURE: 77 MMHG | TEMPERATURE: 99 F | RESPIRATION RATE: 18 BRPM

## 2022-08-19 PROCEDURE — 99283 EMERGENCY DEPT VISIT LOW MDM: CPT

## 2022-08-19 NOTE — ED PROVIDER NOTE - PATIENT PORTAL LINK FT
You can access the FollowMyHealth Patient Portal offered by [facility] by registering at the following website: [email or web address]. By joining [employer]’s FollowMyHealth portal, you will also be able to view your health information using other applications (apps) compatible with our system.

## 2022-08-19 NOTE — ED PROVIDER NOTE - ATTENDING CONTRIBUTION TO CARE
AJM: pt presenting for replacement of conley. pt being managed by home hospice who tried to change catheter today but was unable to insert a conley after removing the old one.    no new complaints. new conley placed in ED. plan confirmed with home hospice team. stable for dc. large sacral ulcer is chronic and unchanged in appearance per aid at bedside. will re-dress

## 2022-08-19 NOTE — ED PROVIDER NOTE - OBJECTIVE STATEMENT
89 y/o male w/PMHx of DM, Afib, Neuropathy, and HTN on a chronic conley presents from home w/nursing aide. Hx provided by aide via Turkish creole  ID#[id number]. Pt's hospice team was unable to replace conley due to resistance, so pt came here. Also c/o scar in his back that is very deep from a previous hospitalization several months ago.

## 2022-08-19 NOTE — ED PROVIDER NOTE - CLINICAL SUMMARY MEDICAL DECISION MAKING FREE TEXT BOX
89 y/o male requiring a conley catheter change with slow healing pressure ulcer. Ulcer not showing signs of infection or necrotic tissue. New conley catheter inserted and dressings for pressure ulcer changed. Safe for d/c, with return precautions given.

## 2022-08-19 NOTE — ED ADULT NURSE NOTE - NSFALLRSKASSESSDT_ED_ALL_ED
19-Aug-2022 14:00 home safety/fall prevention , stairs as needed/balance training/bed mobility training/gait training/motor coordination training/ROM/strengthening/transfer training

## 2022-08-19 NOTE — ED ADULT TRIAGE NOTE - CHIEF COMPLAINT QUOTE
Pt BIBA from home, speaks Uzbek creole. Per ems, pts family at home is requesting that his chronic conley cath be changed. Pt in no apparent pain or distress.

## 2022-08-19 NOTE — ED PROVIDER NOTE - NSFOLLOWUPINSTRUCTIONS_ED_ALL_ED_FT
Kaiser Catheter Placement and Care    WHAT YOU NEED TO KNOW:    A Kaiser catheter is a sterile tube that is inserted into your bladder to drain urine. It is also called an indwelling urinary catheter.     DISCHARGE INSTRUCTIONS:    Seek care immediately if:   •Your catheter comes out.   •You suddenly have material that looks like sand in the tubing or drainage bag.  •No urine is draining into the bag and you have checked the system.  •You have pain in your hip, back, pelvis, or lower abdomen.  •You are confused or cannot think clearly.      Call your doctor or urologist if:   •You have a fever.  •You have bladder spasms for more than 1 day after the catheter is placed.  •You see blood in the tubing or drainage bag.  •You have a rash or itching where the catheter tube is secured to your skin.  •Urine leaks from or around the catheter, tubing, or drainage bag.  •The closed drainage system has accidently come open or apart.   •You see a layer of crystals inside the tubing.  •You have questions or concerns about your condition or care.      Care for your catheter and drainage bag: You can reduce your risk for infection and injury by caring for your catheter and drainage bag properly.  •Wash your hands often. Wash before and after you touch your catheter, tubing, or drainage bag. Use soap and water. Wear clean disposable gloves when you care for your catheter or disconnect the drainage bag. Wash your hands before you prepare or eat food.     Handwashing  •Clean your genital area 2 times every day. Clean your catheter area and anal opening after every bowel movement. ?For men: Use a soapy cloth to clean the tip of your penis. Start where the catheter enters. Wipe backward making sure to pull back the foreskin. Then use a cloth with clear water in the same direction to clean away the soap.     ?For women: Use a soapy cloth to clean the area that the catheter enters your body. Make sure to separate your labia and wipe toward the anus. Then use a cloth with clear water and wipe in the same direction.   •Secure the catheter tube so you do not pull or move the catheter. This helps prevent pain and bladder spasms. Healthcare providers will show you how to use medical tape or a strap to secure the catheter tube to your body.   •Keep a closed drainage system. Your catheter should always be attached to the drainage bag to form a closed system. Do not disconnect any part of the closed system unless you need to change the bag.  •Keep the drainage bag below the level of your waist. This helps stop urine from moving back up the tubing and into your bladder. Do not loop or kink the tubing. This can cause urine to back up and collect in your bladder. Do not let the drainage bag touch or lie on the floor.  •Empty the drainage bag when needed. The weight of a full drainage bag can be painful. Empty the drainage bag every 3 to 6 hours or when it is ? full.   •Clean and change the drainage bag as directed. Ask your healthcare provider how often you should change the drainage bag and what cleaning solution to use. Wear disposable gloves when you change the bag. Do not allow the end of the catheter or tubing to touch anything. Clean the ends with an alcohol pad before you reconnect them.      What to do if problems develop:   •No urine is draining into the bag: ?Check for kinks in the tubing and straighten them out.   ?Check the tape or strap used to secure the catheter tube to your skin. Make sure it is not blocking the tube.   ?Make sure you are not sitting or lying on the tubing.  ?Make sure the urine bag is hanging below the level of your waist.  •Urine leaks from or around the catheter, tubing, or drainage bag: Check if the closed drainage system has accidently come open or apart. Clean the catheter and tubing ends with a new alcohol pad and reconnect them.   Follow up with your doctor or urologist as directed: Write down your questions so you remember to ask them during your visits.

## 2022-08-19 NOTE — ED PROVIDER NOTE - PHYSICAL EXAMINATION
Pt is nonverbal, PE is limited by inability to cooperate    General: NAD  Head: NC, AT  EENT: EOMI, no scleral icterus  Cardiac: RRR, no apparent murmurs, no lower extremity edema  Respiratory: CTABL, no respiratory distress   Abdomen: soft, ND, NT, nonperitonitic  MSK/Vascular: pressure ulcer noted with depth of 1.5-2cm and measures 6cm x 3cm, granulation tissue present, nonpurulent, limited ROM at baseline, soft compartments, warm extremities, diffuse ecchymotic lesions Pt is nonverbal, PE is limited by inability to cooperate    General: NAD  Head: NC, AT  EENT: EOMI, no scleral icterus  Cardiac: Tachycardic rate, Regular rhythm, no apparent murmurs, no lower extremity edema  Respiratory: CTABL, no respiratory distress   Abdomen: soft, ND, NT, nonperitonitic  MSK/Vascular: pressure ulcer noted with depth of 1.5-2cm and measures 6cm x 3cm, granulation tissue present, nonpurulent, limited ROM at baseline, soft compartments, warm extremities, diffuse ecchymotic lesions

## 2022-08-19 NOTE — ED ADULT NURSE NOTE - NSIMPLEMENTINTERV_GEN_ALL_ED
Implemented All Fall Risk Interventions:  [place] to call system. Call bell, personal items and telephone within reach. Instruct patient to call for assistance. Room bathroom lighting operational. Non-slip footwear when patient is off stretcher. Physically safe environment: no spills, clutter or unnecessary equipment. Stretcher in lowest position, wheels locked, appropriate side rails in place. Provide visual cue, wrist band, yellow gown, etc. Monitor gait and stability. Monitor for mental status changes and reorient to person, place, and time. Review medications for side effects contributing to fall risk. Reinforce activity limits and safety measures with patient and family.

## 2022-08-19 NOTE — ED PROVIDER NOTE - CARE PLAN
Principal Discharge DX:	Urinary catheter (Kaiser) change required  Secondary Diagnosis:	Healing pressure ulcer   1

## 2022-10-10 NOTE — ED ADULT TRIAGE NOTE - RESPIRATORY RATE (BREATHS/MIN)
24 Complex Repair And O-L Flap Text: The defect edges were debeveled with a #15 scalpel blade.  The primary defect was closed partially with a complex linear closure.  Given the location of the remaining defect, shape of the defect and the proximity to free margins an O-L flap was deemed most appropriate for complete closure of the defect.  Using a sterile surgical marker, an appropriate flap was drawn incorporating the defect and placing the expected incisions within the relaxed skin tension lines where possible.    The area thus outlined was incised deep to adipose tissue with a #15 scalpel blade.  The skin margins were undermined to an appropriate distance in all directions utilizing iris scissors.

## 2023-01-21 NOTE — PROGRESS NOTE ADULT - ASSESSMENT
89M with a history of hypertension, diabetes, gastric mass, and GERD who presented with a three day history of confusion and lethargy. He was found to have urine retention with acute kidney injury and a Kaiser catheter was inserted with drainage of a large volume of urine.    Toxic metabolic encephalopathy - Speech Pathology evaluation noted, to avoid oral intake for now. On intravenous fluids for hydration, will further follow with recommendations, will continue with IV fluids.      Acute kidney injury - Kaiser catheter previously inserted with drainage of a large amount of urine. Urinalysis was not indicative of infection and urine culture was without growth. Hyperkalemia resolved on repeat studies. Lisinopril-HCTZ was held on admission. For initiation of tamsulosin if the patient is able to tolerate oral intake. Pending CT of the abdomen, creatine has been down from 8 to 3, will continue to monitor.     Bacteremia - Blood culture noted coagulase negative Staph suspected to be a contaminant. Final blood culture result to be reviewed when available.    Diabetes - Insulin coverage, close monitoring of blood glucose levels. Basaglar was held as the patient was noted to have acute kidney injury and hypoglycemia. Now with hyperglycemia, to discontinue dextrose infusion.    Hypertension - Close blood pressure monitoring, meds on on hold.     HLD: will resume statins once ok to take oral meds.       Glaucoma - Continue with eye drops.    Neuropathy - For resumption of gabapentin if the patient is able to tolerate oral intake.    Overall prognosis guarded / poor. Palliative Care following. The patient was noted to have DNR/DNI orders on the prior hospitalization.     DVT procedure.          Prior Outpatient Labs/Outside ED Records

## 2023-06-08 NOTE — ED PROVIDER NOTE - WR ORDER STATUS 1
Patient at f/u today with Dr Anastasiia Costa  Radiation oncology consult referral has been placed  Verified scheduling is in process however could be after wound is healed  Performed Resulted

## 2023-08-07 NOTE — PATIENT PROFILE ADULT - NSPROPOAURINARYCATHETER_GEN_A_NUR
August 7, 2023      [facility]  [address] [facility]  [place] [address]  Phone: [phone]  Fax: [phone]       Patient: Alan Vu   YOB: 1988  Date of Visit: 08/07/2023    To Whom It May Concern:    Jayda Vu  was at [facility] on 08/07/2023. The patient may return to work/school on 08/08/2023 with no restrictions. If you have any questions or concerns, or if I can be of further assistance, please do not hesitate to contact me.    Sincerely,    Tiff Gaston LPN     
no

## 2023-11-13 NOTE — DISCHARGE NOTE NURSING/CASE MANAGEMENT/SOCIAL WORK - NSDCPEXARELTOFU_GEN_ALL_CORE
Go for blood tests as directed. Your doctor will do lab tests at regular visits to monitor the effects of this medicine. Please follow up with your doctor and keep your health care provider appointments. verbal cues/1 person assist

## 2023-11-28 NOTE — ED PROVIDER NOTE - QRS
Kena from [facility] is looking for the status of the prescriptions she sent over for insulin infusion pump supplies.  Please call her back at [phone]  
qtc [phone]

## 2024-08-02 NOTE — PATIENT PROFILE ADULT - NSTOBACCONEVERSMOKERY/N_GEN_A
08/02/24                            Teresaon Jr  [address]  [address]  [facility] [address]    To Whom It May Concern:    This is to certify Monica Norris was evaluated with Kesha Moss PA-C on 08/02/24 and is excused from work on *** to ***.     RESTRICTIONS: ***            {Signature Options:[id number]}       No

## 2025-05-08 NOTE — CONSULT NOTE ADULT - SUBJECTIVE AND OBJECTIVE BOX
No Vaccines Administered. This is an elderly 89yo M PMH  DM, HTN, A. fib on Xarelto who presents from home with lethargy. Daughter caregiver, noted hematuria in his Conley bag, change in mental status and low blood sugar. He came in to hospital with RVR AFIB, Urinary Retention and Leukocytosis.  Hospice RN called from home to see Patient who had become very lethargic with labored breathing no longer responsive. Lisset was told he should be sent back to Hospital. No fever chills N/V/D CP +AFIB no chronic pain history. Treating for Bacteremia for two days  to assess for any improvement in her father's Mental status.  Today Patient is more awake and alert. Creole speaking RN utilized as a , he knew his name but thought he was home.  HPI:  Patient is a 89M w/ hx of DM, HTN, A. fib on Xarelto who presents from home with lethargy. Of note, patient was recently hospitalized for AMS and his hospital c/b renal failure. Patient is lethargic. History obtained from patient's daughter, Lisset. Per daughter, she had noticed hematuria yesterday. In addition, he was confused and had low FS due to decreased PO intake. No f/c/h/d/n/v. Patient was subsequently discharged home w/ home hospice.     In the ED, his vital signs were notable for tachy 117, tachypnea and BP- 80s/50s. Labs notable for leukocytosis, hyperK- 6.2, ARF, and transaminitis.  (2022 08:14)      PERTINENT PMH REVIEWED: Yes No    PAST MEDICAL & SURGICAL HISTORY:  Diabetes      A-fib      Hypertension      Neuropathy      No significant past surgical history          SOCIAL HISTORY:                      Substance history:                    Admitted from:  home  SNF  [facility]                     Lutheran/spirituality:                    Cultural concerns:                      Surrogate/HCP/Guardian: Phone#:    FAMILY HISTORY:  No pertinent family history in first degree relatives        Allergies    No Known Allergies    Intolerances        ADVANCE DIRECTIVES/TREATMENT PREFERENCES:  Full code, all aggressive measures desired   DNR/DNI - MOLST, continue all other medical treatments  DNR/DNI - MOLST, comfort measures only     Baseline ADLs (prior to admission):  Independent/ Dependent      Karnofsky/Palliative Performance Status Version 2:  %  http://npcrc.org/files/news/palliative_performance_scale_[email or web address].pdf    Present Symptoms:     Dyspnea: Mild Moderate Severe  Nausea/Vomiting: Yes No  Anxiety:  Yes No  Depression: Yes No  Fatigue: Yes No  Loss of appetite: Yes No  Constipation:     Pain:             Character-            Duration-            Effect-            Factors-            Frequency-            Location-            Severity-    Pain AD Score:  [email or web address] (press ctrl + left click to view)    Review of Systems: Reviewed                     Negative:                     Positive:  Unable to obtain due to poor mentation   All others negative    MEDICATIONS  (STANDING):  dextrose 5%. 1000 milliLiter(s) (50 mL/Hr) IV Continuous <Continuous>  dextrose 5%. 1000 milliLiter(s) (100 mL/Hr) IV Continuous <Continuous>  dextrose 50% Injectable 25 Gram(s) IV Push once  dextrose 50% Injectable 12.5 Gram(s) IV Push once  dextrose 50% Injectable 25 Gram(s) IV Push once  glucagon  Injectable 1 milliGRAM(s) IntraMuscular once  insulin lispro (ADMELOG) corrective regimen sliding scale   SubCutaneous every 6 hours  meropenem  IVPB 500 milliGRAM(s) IV Intermittent every 12 hours  metoprolol tartrate Injectable 5 milliGRAM(s) IV Push every 6 hours  sodium chloride 0.45%. 1000 milliLiter(s) (75 mL/Hr) IV Continuous <Continuous>    MEDICATIONS  (PRN):  dextrose Oral Gel 15 Gram(s) Oral once PRN Blood Glucose LESS THAN 70 milliGRAM(s)/deciliter  glycopyrrolate Injectable 0.4 milliGRAM(s) IV Push four times a day PRN Secretions  metoprolol tartrate Injectable 2.5 milliGRAM(s) IV Push every 30 minutes PRN sustained HR >120bmp  ondansetron Injectable 4 milliGRAM(s) IV Push every 8 hours PRN Nausea and/or Vomiting      PHYSICAL EXAM:    Vital Signs Last 24 Hrs  T(C): 36.4 (2022 08:13), Max: 36.9 (2022 00:11)  T(F): 97.5 (2022 08:13), Max: 98.5 (2022 00:11)  HR: 129 (2022 08:13) (80 - 146)  BP: 143/85 (2022 08:13) (114/82 - 148/80)  BP(mean): 1 (2022 08:13) (1 - 1)  RR: 18 (2022 08:13) (18 - 18)  SpO2: 98% (2022 08:13) (92% - 100%)    General: alert  oriented x ____ lethargic agitated delirious                  cachexia  nonverbal  coma    HEENT: normal  dry mouth  ET tube/trach    Lungs: comfortable tachypnea/labored breathing  excessive secretions    CV: normal  tachycardia    GI: normal  distended  tender  no BS               PEG/NG/OG tube  constipation  last BM:     : normal  incontinent  oliguria/anuria  conley    MSK: normal  weakness  edema             ambulatory  bedbound/wheelchair bound    Neuro: no focal deficits cognitive impairment dysphagia dysarthria hemiplegia     Skin: normal  pressure ulcers- Stage_____  no rash    LABS:                        10.3   21.33 )-----------( 149      ( 2022 10:40 )             31.3     06-    138  |  103  |  50.3<H>  ----------------------------<  130<H>  4.9   |  20.0<L>  |  1.64<H>    Ca    10.6<H>      2022 06:29  Phos  2.4       Mg     2.1         TPro  6.5<L>  /  Alb  3.1<L>  /  TBili  0.9  /  DBili  x   /  AST  36  /  ALT  44<H>  /  AlkPhos  141<H>        Urinalysis Basic - ( 2022 18:56 )    Color: Yellow / Appearance: Slightly Turbid / S.010 / pH: x  Gluc: x / Ketone: Negative  / Bili: Negative / Urobili: Negative mg/dL   Blood: x / Protein: Negative / Nitrite: Negative   Leuk Esterase: Moderate / RBC: >50 /HPF / WBC >50 /HPF   Sq Epi: x / Non Sq Epi: Moderate / Bacteria: Few      I&O's Summary      RADIOLOGY & ADDITIONAL STUDIES:     This is an elderly 89yo M PMH  DM, HTN, A. fib on Xarelto who presents from home with lethargy. Daughter caregiver, noted hematuria in his Conley bag, change in mental status and low blood sugar. He came in to hospital with RVR AFIB, Urinary Retention and Leukocytosis.  Hospice RN called from home to see Patient who had become very lethargic with labored breathing no longer responsive. Lisset was told he should be sent back to Hospital. No fever chills N/V/D CP +AFIB no chronic pain history. Treating for Bacteremia for two days to assess if antibiotics improve his overall condition and mental status. Today Patient is more awake and alert.   Creole speaking RN utilized as a , was able to answer Yes/No questions speech difficult to understand-he knew his name but thought he was home.  HPI:  Patient is a 89M w/ hx of DM, HTN, A. fib on Xarelto who presents from home with lethargy. Of note, patient was recently hospitalized for AMS and his hospital c/b renal failure. Patient is lethargic. History obtained from patient's daughter, Lisset. Per daughter, she had noticed hematuria yesterday. In addition, he was confused and had low FS due to decreased PO intake. No f/c/h/d/n/v. Patient was subsequently discharged home w/ home hospice.     In the ED, his vital signs were notable for tachy 117, tachypnea and BP- 80s/50s. Labs notable for leukocytosis, hyperK- 6.2, ARF, and transaminitis.  (2022 08:14)    PERTINENT PMH REVIEWED: Yes     PAST MEDICAL & SURGICAL HISTORY:  Diabetes    A-fib    Hypertension    Neuropathy    No significant past surgical history    SOCIAL HISTORY:                      Substance history: none                    Admitted from:  home (Home Hospice support services)                     Religious/spirituality: [facility]                    Cultural concerns: Creole speaking                      HCP: daughter Kajal Benavides [phone]  or (c) [phone]     FAMILY HISTORY:  No pertinent family history in first degree relatives    No Known Allergies    ADVANCE DIRECTIVES/TREATMENT PREFERENCES:  DNR/DNI - MOLST, continue  other medical treatments   Home hospice support requesting to resume when able to be discharged    Baseline ADLs (prior to admission):   Dependent      Karnofsky/Palliative Performance Status Version 10:  %    Present Symptoms:     Dyspnea: no  Nausea/Vomiting:  No  Anxiety:   No  Depression:?  Fatigue: Yes   Loss of appetite: Yes   Constipation: no    Pain: No chronic pain PMH-denies pain when asked            Character-            Duration-            Effect-            Factors-            Frequency-            Location-            Severity-    Review of Systems: Reviewed                       Unable to obtain due to poor mentation       MEDICATIONS  (STANDING):  dextrose 5%. 1000 milliLiter(s) (50 mL/Hr) IV Continuous <Continuous>  dextrose 5%. 1000 milliLiter(s) (100 mL/Hr) IV Continuous <Continuous>  dextrose 50% Injectable 25 Gram(s) IV Push once  dextrose 50% Injectable 12.5 Gram(s) IV Push once  dextrose 50% Injectable 25 Gram(s) IV Push once  glucagon  Injectable 1 milliGRAM(s) IntraMuscular once  insulin lispro (ADMELOG) corrective regimen sliding scale   SubCutaneous every 6 hours  meropenem  IVPB 500 milliGRAM(s) IV Intermittent every 12 hours  metoprolol tartrate Injectable 5 milliGRAM(s) IV Push every 6 hours  sodium chloride 0.45%. 1000 milliLiter(s) (75 mL/Hr) IV Continuous <Continuous>    MEDICATIONS  (PRN):  dextrose Oral Gel 15 Gram(s) Oral once PRN Blood Glucose LESS THAN 70 milliGRAM(s)/deciliter  glycopyrrolate Injectable 0.4 milliGRAM(s) IV Push four times a day PRN Secretions  metoprolol tartrate Injectable 2.5 milliGRAM(s) IV Push every 30 minutes PRN sustained HR >120bmp  ondansetron Injectable 4 milliGRAM(s) IV Push every 8 hours PRN Nausea and/or Vomiting    PHYSICAL EXAM:    Vital Signs Last 24 Hrs  T(C): 36.4 (2022 08:13), Max: 36.9 (2022 00:11)  T(F): 97.5 (2022 08:13), Max: 98.5 (2022 00:11)  HR: 129 (2022 08:13) (80 - 146)  BP: 143/85 (2022 08:13) (114/82 - 148/80)  BP(mean): 1 (2022 08:13) (1 - 1)  RR: 18 (2022 08:13) (18 - 18)  SpO2: 98% (2022 08:13) (92% - 100%)    General: awake for short periods of time ____ lethargic                   cachexia  1-2 words verbal      HEENT:  dry mouth      Lungs: comfortable    CV:  tachycardia AFIB with RVR HR in 120's (given Metroprolol)    GI: soft belly                 constipation  last BM: none    : urinary retention conley    MSK:   weakness               bedbound/wheelchair bound    Neuro:  cognitive impairment dysphagia     Skin:   no rash    LABS:                        10.3   21.33 )-----------( 149      ( 2022 10:40 )             31.3         138  |  103  |  50.3<H>  ----------------------------<  130<H>  4.9   |  20.0<L>  |  1.64<H>    Ca    10.6<H>      2022 06:29  Phos  2.4       Mg     2.1         TPro  6.5<L>  /  Alb  3.1<L>  /  TBili  0.9  /  DBili  x   /  AST  36  /  ALT  44<H>  /  AlkPhos  141<H>      Urinalysis Basic - ( 2022 18:56 )    Color: Yellow / Appearance: Slightly Turbid / S.010 / pH: x  Gluc: x / Ketone: Negative  / Bili: Negative / Urobili: Negative mg/dL   Blood: x / Protein: Negative / Nitrite: Negative   Leuk Esterase: Moderate / RBC: >50 /HPF / WBC >50 /HPF   Sq Epi: x / Non Sq Epi: Moderate / Bacteria: Few    I&O's Summary    RADIOLOGY & ADDITIONAL STUDIES:  < from: CT Head No Cont (22 @ 20:54) >  MPRESSION:    No obvious acute intracranial hemorrhage, large cortical infarct or mass   effect. Additional findings as described. If clinically indicated,   short-term follow-up or MRI may be obtained for further evaluation.      < end of copied text >  < from: Xray Chest 1 View- PORTABLE-Urgent (Xray Chest 1 View- PORTABLE-Urgent .) (22 @ 09:12) >  COMPARISON: 2022    The cardiomediastinal silhouette is normal and the cheyanne are not enlarged.   Aortic calcification. The trachea is midline. Mild platelike atelectatic   streaks at the lung bases. There is no focal lung consolidation or   sizable pleural effusion. No significant osseous abnormality.    IMPRESSION:    Unremarkable frontal chest x ray    < end of copied text >  Urinalysis Basic - ( 2022 18:56 )    Color: Yellow / Appearance: Slightly Turbid / S.010 / pH: x  Gluc: x / Ketone: Negative  / Bili: Negative / Urobili: Negative mg/dL   Blood: x / Protein: Negative / Nitrite: Negative   Leuk Esterase: Moderate / RBC: >50 /HPF / WBC >50 /HPF   Sq Epi: x / Non Sq Epi: Moderate / Bacteria: Few    Culture - Blood (collected 2022 11:00)  Source: .Blood Blood-Venous  Gram Stain (2022 06:13):    Growth in aerobic bottle: Gram Negative Rods    Growth in anaerobic bottle: Gram Negative Rods  Preliminary Report (2022 12:43):    Growth in aerobic and anaerobic bottles: Enterobacter cloacae complex    See previous culture [id number]    Culture - Blood (collected 2022 11:00)  Source: .Blood Blood-Peripheral  Gram Stain (2022 06:12):    Growth in aerobic bottle: Gram Negative Rods    Growth in anaerobic bottle: Gram Negative Rods  Preliminary Report (2022 13:17):    Growth in aerobic and anaerobic bottles: Enterobacter cloacae complex    ***Blood Panel PCR results on this specimen are available    approximately 3 hours after the Gram stain result.***    Gram stain, PCR, and/or culture results may not always    correspond due to difference in methodologies.    ************************************************************    This PCR assay was performed by multiplex PCR. This    Assay tests for 66 bacterial and resistance gene targets.    Please refer to the [facility] test directory    at [email or web address] for details.  Organism: Blood Culture PCR (2022 06:37)  Organism: Blood Culture PCR (2022 06:37)           This is an elderly 89yo M PMH  DM, HTN, A. fib on Xarelto who presents from home with lethargy. Daughter caregiver, noted hematuria in his Conley bag, change in mental status and low blood sugar. He came in to hospital with RVR AFIB, Urinary Retention and Leukocytosis.  Hospice RN called from home to see Patient who had become very lethargic with labored breathing no longer responsive. Lisset was told he should be sent back to Hospital. No fever chills N/V/D CP +AFIB no chronic pain history. Treating for Bacteremia for two days to assess if antibiotics improve his overall condition and mental status. Today Patient is more awake and alert.   Creole speaking RN utilized as a , was able to answer Yes/No questions speech difficult to understand-he knew his name but thought he was home.  HPI:  Patient is a 89M w/ hx of DM, HTN, A. fib on Xarelto who presents from home with lethargy. Of note, patient was recently hospitalized for AMS and his hospital c/b renal failure. Patient is lethargic. History obtained from patient's daughter, Lisset. Per daughter, she had noticed hematuria yesterday. In addition, he was confused and had low FS due to decreased PO intake. No f/c/h/d/n/v. Patient was subsequently discharged home w/ home hospice.     In the ED, his vital signs were notable for tachy 117, tachypnea and BP- 80s/50s. Labs notable for leukocytosis, hyperK- 6.2, ARF, and transaminitis.  (2022 08:14)    PERTINENT PMH REVIEWED: Yes     PAST MEDICAL & SURGICAL HISTORY:  Diabetes    A-fib    Hypertension    Neuropathy    No significant past surgical history    SOCIAL HISTORY:                      Substance history: none                    Admitted from:  home (Home Hospice support services)                     Catholic/spirituality: [facility]                    Cultural concerns: Creole speaking                      HCP: daughter Kajal Benavides [phone]  or (c) [phone]     FAMILY HISTORY:  No pertinent family history in first degree relatives    No Known Allergies    ADVANCE DIRECTIVES/TREATMENT PREFERENCES:  DNR/DNI - MOLST, continue  other medical treatments   Home hospice support requesting to resume when able to be discharged    Baseline ADLs (prior to admission):   Dependent      Karnofsky/Palliative Performance Status Version 10:  %    Present Symptoms:     Dyspnea: no  Nausea/Vomiting:  No  Anxiety:   No  Depression:?  Fatigue: Yes   Loss of appetite: Yes   Constipation: no    Pain: No chronic pain PMH-denies pain when asked            Character-            Duration-            Effect-            Factors-            Frequency-            Location-            Severity-    Review of Systems: Reviewed                       Unable to obtain due to poor mentation       MEDICATIONS  (STANDING):  dextrose 5%. 1000 milliLiter(s) (50 mL/Hr) IV Continuous <Continuous>  dextrose 5%. 1000 milliLiter(s) (100 mL/Hr) IV Continuous <Continuous>  dextrose 50% Injectable 25 Gram(s) IV Push once  dextrose 50% Injectable 12.5 Gram(s) IV Push once  dextrose 50% Injectable 25 Gram(s) IV Push once  glucagon  Injectable 1 milliGRAM(s) IntraMuscular once  insulin lispro (ADMELOG) corrective regimen sliding scale   SubCutaneous every 6 hours  meropenem  IVPB 500 milliGRAM(s) IV Intermittent every 12 hours  metoprolol tartrate Injectable 5 milliGRAM(s) IV Push every 6 hours  sodium chloride 0.45%. 1000 milliLiter(s) (75 mL/Hr) IV Continuous <Continuous>    MEDICATIONS  (PRN):  dextrose Oral Gel 15 Gram(s) Oral once PRN Blood Glucose LESS THAN 70 milliGRAM(s)/deciliter  glycopyrrolate Injectable 0.4 milliGRAM(s) IV Push four times a day PRN Secretions  metoprolol tartrate Injectable 2.5 milliGRAM(s) IV Push every 30 minutes PRN sustained HR >120bmp  ondansetron Injectable 4 milliGRAM(s) IV Push every 8 hours PRN Nausea and/or Vomiting    PHYSICAL EXAM:    Vital Signs Last 24 Hrs  T(C): 36.4 (2022 08:13), Max: 36.9 (2022 00:11)  T(F): 97.5 (2022 08:13), Max: 98.5 (2022 00:11)  HR: 129 (2022 08:13) (80 - 146)  BP: 143/85 (2022 08:13) (114/82 - 148/80)  BP(mean): 1 (2022 08:13) (1 - 1)  RR: 18 (2022 08:13) (18 - 18)  SpO2: 98% (2022 08:13) (92% - 100%)    General: awake for short periods of time ____ lethargic                   cachexia  1-2 words verbal      HEENT:  dry mouth      Lungs: comfortable    CV:  tachycardia AFIB with RVR HR in 120's (given Metroprolol)    GI: soft belly                 constipation  last BM: none    : urinary retention conley    MSK:   weakness               bedbound/wheelchair bound    Neuro:  cognitive impairment dysphagia     Skin:   no rash    LABS:                        10.3   21.33 )-----------( 149      ( 2022 10:40 )             31.3         138  |  103  |  50.3<H>  ----------------------------<  130<H>  4.9   |  20.0<L>  |  1.64<H>    Ca    10.6<H>      2022 06:29  Phos  2.4       Mg     2.1         TPro  6.5<L>  /  Alb  3.1<L>  /  TBili  0.9  /  DBili  x   /  AST  36  /  ALT  44<H>  /  AlkPhos  141<H>      Urinalysis Basic - ( 2022 18:56 )    Color: Yellow / Appearance: Slightly Turbid / S.010 / pH: x  Gluc: x / Ketone: Negative  / Bili: Negative / Urobili: Negative mg/dL   Blood: x / Protein: Negative / Nitrite: Negative   Leuk Esterase: Moderate / RBC: >50 /HPF / WBC >50 /HPF   Sq Epi: x / Non Sq Epi: Moderate / Bacteria: Few    I&O's Summary    RADIOLOGY & ADDITIONAL STUDIES:  < from: CT Head No Cont (22 @ 20:54) >  MPRESSION:    No obvious acute intracranial hemorrhage, large cortical infarct or mass   effect. Additional findings as described. If clinically indicated,   short-term follow-up or MRI may be obtained for further evaluation.      < end of copied text >  < from: Xray Chest 1 View- PORTABLE-Urgent (Xray Chest 1 View- PORTABLE-Urgent .) (22 @ 09:12) >  COMPARISON: 2022    The cardiomediastinal silhouette is normal and the cheyanne are not enlarged.   Aortic calcification. The trachea is midline. Mild platelike atelectatic   streaks at the lung bases. There is no focal lung consolidation or   sizable pleural effusion. No significant osseous abnormality.    IMPRESSION:    Unremarkable frontal chest x ray    < end of copied text >  Urinalysis Basic - ( 2022 18:56 )    Color: Yellow / Appearance: Slightly Turbid / S.010 / pH: x  Gluc: x / Ketone: Negative  / Bili: Negative / Urobili: Negative mg/dL   Blood: x / Protein: Negative / Nitrite: Negative   Leuk Esterase: Moderate / RBC: >50 /HPF / WBC >50 /HPF   Sq Epi: x / Non Sq Epi: Moderate / Bacteria: Few    Culture - Blood (collected 2022 11:00)  Source: .Blood Blood-Venous  Gram Stain (2022 06:13):    Growth in aerobic bottle: Gram Negative Rods    Growth in anaerobic bottle: Gram Negative Rods  Preliminary Report (2022 12:43):    Growth in aerobic and anaerobic bottles: Enterobacter cloacae complex    See previous culture [id number]    Culture - Blood (collected 2022 11:00)  Source: .Blood Blood-Peripheral  Gram Stain (2022 06:12):    Growth in aerobic bottle: Gram Negative Rods    Growth in anaerobic bottle: Gram Negative Rods  Preliminary Report (2022 13:17):    Growth in aerobic and anaerobic bottles: Enterobacter cloacae complex    ***Blood Panel PCR results on this specimen are available    approximately 3 hours after the Gram stain result.***    Gram stain, PCR, and/or culture results may not always    correspond due to difference in methodologies.    ************************************************************    This PCR assay was performed by multiplex PCR. This    Assay tests for 66 bacterial and resistance gene targets.    Please refer to the [facility] test directory    at [email or web address] for details.  Organism: Blood Culture PCR (2022 06:37)  Organism: Blood Culture PCR (2022 06:37)

## 2025-07-03 NOTE — PROGRESS NOTE ADULT - ASSESSMENT
89M with a history of hypertension, diabetes, gastric mass, and GERD who presented with a three day history of confusion and lethargy.     A fib rvr  HRstill high. improve when takes meds , needs much encouragement to take PO according to RNs   pt is inconsistence w/ po meds, confusion / combative latherjanis   Has Prn order for iv lopressor getting it sporidially   Xarelto   monitor lytes and replaced  over all prognosis gaurded      VINCE 2/2 urinary retention likely from BPH  Cr improving  c/w conley  continue to hold HCTZ-lisinopril  c/w tamsulosin  urology consult appreciated.  urology rec to  discharged on conley, f/u out pt  f/u bmp      Metabolic encephalopathy   monitor mental status  ? underlying dementia     Dysphagia  SLP following- rec soft bite sized diet with mildly thick liquid      Diabetes - Insulin coverage,     Hypertension  Monitor bp.stable  continue bb    HLD:  statins     Glaucoma    Continue with eye drops.    Neuropathy  gabapentin       Overall prognosis guarded / poor. Palliative Care following.  hospice team/me unable to reach daughter. per sw - daughter refused hospice.   DNR/DNI  DVT prophylaxis: Scds      Dispo: plan to discharge home when HR is controlled [Negative] : Heme/Lymph